# Patient Record
Sex: MALE | Race: WHITE | HISPANIC OR LATINO | Employment: UNEMPLOYED | URBAN - METROPOLITAN AREA
[De-identification: names, ages, dates, MRNs, and addresses within clinical notes are randomized per-mention and may not be internally consistent; named-entity substitution may affect disease eponyms.]

---

## 2018-09-09 ENCOUNTER — HOSPITAL ENCOUNTER (EMERGENCY)
Facility: HOSPITAL | Age: 28
End: 2018-09-10
Attending: EMERGENCY MEDICINE | Admitting: EMERGENCY MEDICINE

## 2018-09-09 DIAGNOSIS — F20.9 SCHIZOPHRENIA (HCC): Primary | ICD-10-CM

## 2018-09-09 DIAGNOSIS — R46.89 AGGRESSIVE BEHAVIOR: ICD-10-CM

## 2018-09-09 DIAGNOSIS — R45.850 HOMICIDAL IDEATIONS: ICD-10-CM

## 2018-09-09 DIAGNOSIS — R45.851 SUICIDAL IDEATION: ICD-10-CM

## 2018-09-09 LAB
AMPHETAMINES SERPL QL SCN: NEGATIVE
BARBITURATES UR QL: NEGATIVE
BENZODIAZ UR QL: NEGATIVE
COCAINE UR QL: NEGATIVE
ETHANOL EXG-MCNC: 0.01 MG/DL
METHADONE UR QL: NEGATIVE
OPIATES UR QL SCN: NEGATIVE
PCP UR QL: NEGATIVE
THC UR QL: POSITIVE

## 2018-09-09 PROCEDURE — 80307 DRUG TEST PRSMV CHEM ANLYZR: CPT | Performed by: PHYSICIAN ASSISTANT

## 2018-09-09 PROCEDURE — 82075 ASSAY OF BREATH ETHANOL: CPT | Performed by: PHYSICIAN ASSISTANT

## 2018-09-09 PROCEDURE — 96372 THER/PROPH/DIAG INJ SC/IM: CPT

## 2018-09-09 RX ORDER — HALOPERIDOL 5 MG/ML
5 INJECTION INTRAMUSCULAR ONCE
Status: COMPLETED | OUTPATIENT
Start: 2018-09-09 | End: 2018-09-09

## 2018-09-09 RX ORDER — LORAZEPAM 2 MG/ML
2 INJECTION INTRAMUSCULAR ONCE
Status: COMPLETED | OUTPATIENT
Start: 2018-09-09 | End: 2018-09-09

## 2018-09-09 RX ADMIN — LORAZEPAM 2 MG: 2 INJECTION INTRAMUSCULAR; INTRAVENOUS at 11:41

## 2018-09-09 RX ADMIN — HALOPERIDOL LACTATE 5 MG: 5 INJECTION, SOLUTION INTRAMUSCULAR at 11:32

## 2018-09-09 NOTE — ED NOTES
Per [address] Lemus will be coming to ER to meet with pt's family to complete 302        Caity Philip RN  09/09/18 2148

## 2018-09-09 NOTE — ED NOTES
Pt talking to himself at times, making exaggerated hand gestures        Rosibel Elizondo RN  09/09/18 2997

## 2018-09-09 NOTE — ED NOTES
Spoke with Joel Rajput from [facility] stating that pt has been denied and the on call CW would need to be paged to come in and conduct a bed search  On call CW paged at this time via the         Arabella Walsh RN  09/09/18 4925

## 2018-09-09 NOTE — ED NOTES
Offered pt blankets, beverage, dimmed lights, other comfort measures but pt states he is not staying  Explained to pt that a crisis worker needs to come speak with him to sort out the events that led to him being brought to ER  Pt states he was outside his house and wanted to get in to go to bed, so he was banging on door  Pt states his family always wants him to be in the hospital   Pt states he needs to go back to [place] because here in [place] and in [facility] knows everything  Pt with rambling speech, but calm and cooperative at this time         Manuel Garcia RN  09/09/18 5570

## 2018-09-09 NOTE — ED NOTES
Chief Complaint   Patient presents with    Psychiatric Evaluation     Patient presents accompanied by [employer], report police were called because patient was agitated, cursing, destroying property, and made remarks regarding killing his family and then himself  Hx of schizophrenia, has not been taking medications as prescribed  Patient provides very little information during triage, rambling and mumbling throughout  Patient presents to ED by police with rapid, tangential speech, both in [address] and [address]  Patient acting erratic, repeating that he is not crazy, and reports being brought here due to his family refusing to let him in he home  Patient made numerous requests to leave, and reports that he has to work at 9:30am today  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

## 2018-09-09 NOTE — ED NOTES
Pt continues to speak loudly to himself and make exaggerated gestures, now including gesturing shooting a gun and making a "pop" noise  Unable to determine from pt's rambling if there is an intended target to shooting gestures        Desire Lopez RN  09/09/18 0204

## 2018-09-09 NOTE — ED NOTES
Pt continues to be cooperative  Offers no complaints at this time  Pt declined meal tray per [employer]        Katelin Grey RN  09/09/18 6490

## 2018-09-09 NOTE — ED NOTES
Patient will need an in network bed due to having no insurance  No beds at [facility]  Phoned Gerardo Lesch, at [facility], they have a bed and will review  302 and face sheet faxed to [facility]

## 2018-09-09 NOTE — ED NOTES
36 petitioned by sister and upheld by [facility] and Dr Fer Lezama  Patient rights read and provided to patient

## 2018-09-09 NOTE — ED NOTES
Pt speaking in a calmer tone and requested restraints off  Pt still behaving erratically however and threatening  Explained restraints could first be reduced and discontinued with continued improvement in decreasing violent behavior       Marisol Mack RN  09/09/18 1281

## 2018-09-09 NOTE — ED NOTES
[facility] [address] [facility] into room 12 accompanied by [employer] Nava RN  09/09/18 9963

## 2018-09-09 NOTE — ED NOTES
Pt very manipulative; screaming from room, upon entering room to check on patient, he is begging and pleading to have restraints removed at this time, explained to patient that when he exhibits improvement in his behaviors     Inga Tabares RN  09/09/18 1889

## 2018-09-09 NOTE — ED ATTENDING ATTESTATION
Kris Matthew MD, saw and evaluated the patient  I have discussed the patient with the resident/non-physician practitioner and agree with the resident's/non-physician practitioner's findings, Plan of Care, and MDM as documented in the resident's/non-physician practitioner's note, except where noted  All available labs and Radiology studies were reviewed  At this point I agree with the current assessment done in the Emergency Department  I have conducted an independent evaluation of this patient a history and physical is as follows:  History of schizophrenia  He has not been taking his medications for 3 years became agitated  He became agitated here and needed to be placed in 4 point restraints with chemical sedation as well and a 302 signed  Patient was not following commands and was quite agitated in the emergency department  Critical Care Time  The patient presented with a condition in which there was a high probability of imminent or life-threatening deterioration, and critical care services (excluding separately billable procedures) totalled 30-74 minutes  CriticalCare Time  Performed by: Malia Houser by: Ann-Marie Bang [facility] provider statement:     Critical care time (minutes):  35    Critical care time was exclusive of:  Separately billable procedures and treating other patients and teaching time    Critical care was necessary to treat or prevent imminent or life-threatening deterioration of the following conditions: Agitation, 4 point restraints and chemical restraint      Critical care was time spent personally by me on the following activities:  Obtaining history from patient or surrogate, development of treatment plan with patient or surrogate, evaluation of patient's response to treatment, examination of patient and re-evaluation of patient's condition    I assumed direction of critical care for this patient from another provider in my specialty: no

## 2018-09-09 NOTE — ED NOTES
Pt continues to talk loudly to himself in room 12, in [facility], gesturing dramatically at times        Geralyn Ganser, RN  09/09/18 [address] [facility] [address] RN  09/09/18 1012

## 2018-09-09 NOTE — ED NOTES
Offered breakfast to pt, however, pt states he does not trust hospital food and will not eat or drink anything while he is here  Pt asking when he can go, advised pt that Crisis needs to come speak with him  Pt remains cooperative but continues to talk out loud to himself in room 12        Shoaib Heller RN  09/09/18 7534

## 2018-09-09 NOTE — ED RE-EVALUATION NOTE
Patient is medically clear for discharge to Psychiatric facility       hCanning DELA CRUZ PA-C  09/09/18 2561

## 2018-09-09 NOTE — ED NOTES
Phoned [facility] ([facility]) AT [place], spoke with Kirsten Harris, they will be in to complete 36 with sister     in agreement

## 2018-09-09 NOTE — ED NOTES
Patient cursing staff in [address] [facility] stating "I'm going to come back and kill all of you, I'm not khan, jump on me right now" patient continues rambling goes back and forth speaking in [place] and [facility] Rowdy Guthrie RN  09/09/18 6247

## 2018-09-09 NOTE — ED NOTES
Patient has no insurance so he will need an in network bed, was denied at [facility]  No beds at [facility], [facility], and [facility]     Bed search in the morning

## 2018-09-09 NOTE — ED NOTES
302 and Act 77 faxed to [facility] ([facility]) AT [place]

## 2018-09-09 NOTE — ED NOTES
Spoke with Patient's sister, whom resides with patient  Sister reports patient went out last night, came home and made threats to kill her, family and himself  Patient acted very erratic, rambling speech, and made threats to smash neighbors car windows  911 was called due to patient's behavior and threats  Sister reports patient has a history of mental illness, multiple past psychiatric hospitalizations in [place], and was court ordered to take medication  Patient has been non  compliant with treatment since moving to [facility] in December 2017  Sister reports patient is a danger to self and others and is willing to petition a 302

## 2018-09-09 NOTE — LETTER
Section I - General Information    Name of Patient: Robert Simpson                 : 1990    Medicare #:____________________  Transport Date: 09/10/18 (PCS is valid for round trips on this date and for all repetitive trips in the 60-day range as noted below )  Origin: [id number]                                                         Destination:________________________________________________  Is the pt's stay covered under Medicare Part A (PPS/DRG)     (_) YES  (_) NO  Closest appropriate facility?  (_) YES  (_) NO  If no, why is transport to more distant facility required?________________________  If hosp-hosp transfer, describe services needed at 2nd facility not available at 1st facility? _________________________________  If hospice pt, is this transport related to pt's terminal illness? (_) YES (_) NO Describe____________________________________    Section II - Medical Necessity Questionnaire  Ambulance transportation is medically necessary only if other means of transport are contraindicated or would be potentially harmful to the patient  To meet this requirement, the patient must either be "bed confined" or suffer from a condition such that transport by means other than ambulance is contraindicated by the patient's condition   The following questions must be answered by the medical professional signing below for this form to be valid:    1)  Describe the MEDICAL CONDITION (physical and/or mental) of this patient AT [address] [place] [address] that requires the patient to be transported in an ambulance and why transport by other means is contraindicated by the patient's condition:__________________________________________________________________________________________________    2) Is the patient "bed confined" as defined below?     (_) YES  (_) NO  To be "be confined" the patient must satisfy all three of the following conditions: (1) unable to get up from bed without Assistance; AND (2) unable to ambulate; AND (3) unable to sit in a chair or wheelchair  3) Can this patient safely be transported by car or wheelchair [place] (i e , seated during transport without a medical attendant or monitoring)?   (_) YES  (_) NO    4) In addition to completing questions 1-3 above, please check any of the following conditions that apply*:  *Note: supporting documentation for any boxes checked must be maintained in the patient's medical records  (_)Contractures   (_)Non-Healed Fractures  (_)Patient is confused (_)Patient is comatose (_)Moderate/severe pain on movement (_)Danger to self/others  (_)IV meds/fluids required (_)Patient is combative(_)Need or possible need for restraints (_)DVT requires elevation of lower extremity  (_)Medical attendant required (_)Requires oxygen-unable to self administer (_)Special handling/isolation/infection control precautions required (_)Unable to tolerate seated position for time needed to transport (_)Hemodynamic monitoring required en route (_)Unable to sit in a chair or wheelchair due to decubitus ulcers or other wounds (_)Cardiac monitoring required en route (_)Morbid obesity requires additional personnel/equipment to safely handle patient (_)Orthopedic device (backboard, halo, pins, traction, brace, wedge, etc,) requiring special handling during transport (_)Other(specify)_______________________________________________    Section III - Signature of Physician or Healthcare Professional  I certify that the above information is true and correct based on my evaluation of this patient, and represent that the patient requires transport by ambulance and that other forms of transport are contraindicated   I understand that this information will be used by the Centers for Medicare and Medicaid Services (CMS) to support the determination of medical necessity for ambulance services, and I represent that I have personal knowledge of the patient's condition at time of transport  (_) If this box is checked, I also certify that the patient is physically or mentally incapable of signing the ambulance service's claim and that the institution with which I am affiliated has furnished care, services, or assistance to the patient  My signature below is made on behalf of the patient pursuant to 42 CFR §424 36(b)(4)  In accordance with 42 CFR §424 37, the specific reason(s) that the patient is physically or mentally incapable of signing the claim form is as follows: _________________________________________________________________________________________________________      Signature of Physician* or Healthcare Professional______________________________________________________________  Signature Date 09/10/18 (For scheduled repetitive transports, this form is not valid for transports performed more than 60 days after this date)    Printed Name & Credentials of Physician or Healthcare Professional (MD, DO, RN, etc )________________________________  *Form must be signed by patient's attending physician for scheduled, repetitive transports   For non-repetitive, unscheduled ambulance transports, if unable to obtain the signature of the attending physician, any of the following may sign (choose appropriate option below)  (_) Physician Assistant (_)  Clinical Nurse Specialist (_)  Registered Nurse  (_)  Nurse Practitioner  (_) Discharge Planner

## 2018-09-09 NOTE — ED NOTES
Security officers at bedside so that [address] officers can leave  Pt calm and cooperative to this point, but [employer] officers state that pt has been violent in the past with them, and has rambling, non-sense speech at present        Elin Hernandez RN  09/09/18 5822

## 2018-09-09 NOTE — ED NOTES
Crisis Worker (CW) received phone call from [place] at [place] [address] that Pt was denied because unit has been maxed out and that another patient was accepted  Bed search will continue

## 2018-09-09 NOTE — LETTER
[facility]  [address] [address]  Dept: [phone]      EMTALA TRANSFER CONSENT    NAME Aleshia Rodriguez                                         1990                              MRN [id number]    I have been informed of my rights regarding examination, treatment, and transfer   by Dr Cal Huitron DO    Benefits: Specialized equipment and/or services available at the receiving facility (Include comment)________________________    Risks: Potential for delay in receiving treatment, Increased discomfort during transfer      Consent for Transfer:  I acknowledge that my medical condition has been evaluated and explained to me by the emergency department physician or other qualified medical person and/or my attending physician, who has recommended that I be transferred to the service of  Accepting Physician: Dr Torin Lan at [address] [place] [address] Name, [address] : [address]  The above potential benefits of such transfer, the potential risks associated with such transfer, and the probable risks of not being transferred have been explained to me, and I fully understand them  The doctor has explained that, in my case, the benefits of transfer outweigh the risks  I agree to be transferred  I authorize the performance of emergency medical procedures and treatments upon me in both transit and upon arrival at the receiving facility  Additionally, I authorize the release of any and all medical records to the receiving facility and request they be transported with me, if possible  I understand that the safest mode of transportation during a medical emergency is an ambulance and that the Hospital advocates the use of this mode of transport   Risks of traveling to the receiving facility by car, including absence of medical control, life sustaining equipment, such as oxygen, and medical personnel has been explained to me and I fully understand them     ([facility])  [ X ]  I consent to the stated transfer and to be transported by ambulance/helicopter  [  ]  I consent to the stated transfer, but refuse transportation by ambulance and accept full responsibility for my transportation by car  I understand the risks of non-ambulance transfers and I exonerate the Hospital and its staff from any deterioration in my condition that results from this refusal     X___________________________________________    DATE  09/10/18  TIME________  Signature of patient or legally responsible individual signing on patient behalf           RELATIONSHIP TO PATIENT_________________________          Provider Certification    NAME Nida Kingsley                                         1990                              MRN [id number]    A medical screening exam was performed on the above named patient  Based on the examination:    Condition Necessitating Transfer The primary encounter diagnosis was Schizophrenia ([facility] [address] )  Diagnoses of Homicidal ideations, Suicidal ideation, and Aggressive behavior were also pertinent to this visit  Patient Condition: The patient has been stabilized such that within reasonable medical probability, no material deterioration of the patient condition or the condition of the unborn child(loree) is likely to result from the transfer, No noted underlying medical condition requiring transfer to another facility   Transfer is per preference and request of patient and/or family    Reason for Transfer: Level of Care needed not available at this facility    Transfer Requirements: [address]  [facility]   · Space available and qualified personnel available for treatment as acknowledged by Angeline Amin [phone]  · Agreed to accept transfer and to provide appropriate medical treatment as acknowledged by       Dr Kaylah Baldwin  · Appropriate medical records of the examination and treatment of the patient are provided at the time of transfer   STAFF INITIAL WHEN COMPLETED me  · Transfer will be performed by qualified personnel from [facility]  and appropriate transfer equipment as required, including the use of necessary and appropriate life support measures  Provider Certification: I have examined the patient and explained the following risks and benefits of being transferred/refusing transfer to the patient/family:  General risk, such as traffic hazards, adverse weather conditions, rough terrain or turbulence, possible failure of equipment (including vehicle or aircraft), or consequences of actions of persons outside the control of the transport personnel      Based on these reasonable risks and benefits to the patient and/or the unborn child(loree), and based upon the information available at the time of the patients examination, I certify that the medical benefits reasonably to be expected from the provision of appropriate medical treatments at another medical facility outweigh the increasing risks, if any, to the individuals medical condition, and in the case of labor to the unborn child, from effecting the transfer      X____________________________________________ DATE 09/10/18        TIME_______      ORIGINAL - SEND TO MEDICAL RECORDS   COPY - SEND WITH PATIENT DURING TRANSFER

## 2018-09-09 NOTE — ED NOTES
Patient provided with sandwich and water as requested  Calm and cooperative  Resting on stretcher        [address] [place] [address], RN  09/09/18 9692

## 2018-09-09 NOTE — ED NOTES
Pt remains cooperative  Does not require additional medication or restraints        Raleigh Guerra RN  09/09/18 8326

## 2018-09-09 NOTE — ED NOTES
Upon presenting 302 patient became agitated and aggressive towards staff, lunged at staff  Control team called to bedside  Pt non redirectable, uncooperative  Pt placed in 4 point locked restraints       Reina Del Rio RN  09/09/18 2087

## 2018-09-09 NOTE — ED NOTES
Pt becoming more agitated  Standing out in hallway stating that he is going to leave  Redirected to his room several times but remains in hallway  CW redirecting pt to room 12 and currently in doorway of room 12 speaking with pt       Vicky Espinosa RN  09/09/18 2179

## 2018-09-10 ENCOUNTER — HOSPITAL ENCOUNTER (INPATIENT)
Facility: HOSPITAL | Age: 28
LOS: 17 days | Discharge: HOME | DRG: 885 | End: 2018-09-27
Attending: PSYCHIATRY & NEUROLOGY | Admitting: PSYCHIATRY & NEUROLOGY

## 2018-09-10 VITALS
SYSTOLIC BLOOD PRESSURE: 120 MMHG | HEART RATE: 88 BPM | OXYGEN SATURATION: 100 % | RESPIRATION RATE: 16 BRPM | TEMPERATURE: 98.1 F | DIASTOLIC BLOOD PRESSURE: 83 MMHG

## 2018-09-10 DIAGNOSIS — F20.0 PARANOID SCHIZOPHRENIA (HCC): Chronic | ICD-10-CM

## 2018-09-10 DIAGNOSIS — R29.818 EXTRAPYRAMIDAL SYMPTOM: Primary | ICD-10-CM

## 2018-09-10 DIAGNOSIS — F20.9 SCHIZOPHRENIA (HCC): ICD-10-CM

## 2018-09-10 LAB
BACTERIA UR QL AUTO: ABNORMAL /HPF
BILIRUB UR QL STRIP: NEGATIVE
CLARITY UR: CLEAR
COLOR UR: YELLOW
GLUCOSE UR STRIP-MCNC: NEGATIVE MG/DL
HGB UR QL STRIP.AUTO: ABNORMAL
KETONES UR STRIP-MCNC: NEGATIVE MG/DL
LEUKOCYTE ESTERASE UR QL STRIP: NEGATIVE
NITRITE UR QL STRIP: NEGATIVE
NON-SQ EPI CELLS URNS QL MICRO: ABNORMAL /HPF
PH UR STRIP.AUTO: 7 [PH] (ref 4.5–8)
PROT UR STRIP-MCNC: NEGATIVE MG/DL
RBC #/AREA URNS AUTO: ABNORMAL /HPF
SP GR UR STRIP.AUTO: 1.02 (ref 1–1.03)
UROBILINOGEN UR QL STRIP.AUTO: 1 E.U./DL
WBC #/AREA URNS AUTO: ABNORMAL /HPF

## 2018-09-10 PROCEDURE — 96372 THER/PROPH/DIAG INJ SC/IM: CPT

## 2018-09-10 PROCEDURE — 99285 EMERGENCY DEPT VISIT HI MDM: CPT

## 2018-09-10 PROCEDURE — 81001 URINALYSIS AUTO W/SCOPE: CPT | Performed by: PHYSICIAN ASSISTANT

## 2018-09-10 RX ORDER — RISPERIDONE 1 MG/1
1 TABLET, ORALLY DISINTEGRATING ORAL
Status: DISCONTINUED | OUTPATIENT
Start: 2018-09-10 | End: 2018-09-11

## 2018-09-10 RX ORDER — TRAZODONE HYDROCHLORIDE 50 MG/1
50 TABLET ORAL
Status: CANCELLED | OUTPATIENT
Start: 2018-09-10

## 2018-09-10 RX ORDER — LORAZEPAM 2 MG/ML
2 INJECTION INTRAMUSCULAR EVERY 6 HOURS PRN
Status: DISCONTINUED | OUTPATIENT
Start: 2018-09-10 | End: 2018-09-27 | Stop reason: HOSPADM

## 2018-09-10 RX ORDER — BENZTROPINE MESYLATE 0.5 MG/1
1 TABLET ORAL EVERY 6 HOURS PRN
Status: CANCELLED | OUTPATIENT
Start: 2018-09-10

## 2018-09-10 RX ORDER — OLANZAPINE 10 MG/1
10 INJECTION, POWDER, LYOPHILIZED, FOR SOLUTION INTRAMUSCULAR
Status: CANCELLED | OUTPATIENT
Start: 2018-09-10

## 2018-09-10 RX ORDER — LORAZEPAM 1 MG/1
1 TABLET ORAL EVERY 6 HOURS PRN
Status: CANCELLED | OUTPATIENT
Start: 2018-09-10

## 2018-09-10 RX ORDER — ACETAMINOPHEN 325 MG/1
650 TABLET ORAL EVERY 6 HOURS PRN
Status: DISCONTINUED | OUTPATIENT
Start: 2018-09-10 | End: 2018-09-27 | Stop reason: HOSPADM

## 2018-09-10 RX ORDER — LORAZEPAM 1 MG/1
1 TABLET ORAL ONCE
Status: DISCONTINUED | OUTPATIENT
Start: 2018-09-10 | End: 2018-09-10 | Stop reason: HOSPADM

## 2018-09-10 RX ORDER — OLANZAPINE 10 MG/1
10 TABLET ORAL
Status: DISCONTINUED | OUTPATIENT
Start: 2018-09-10 | End: 2018-09-27 | Stop reason: HOSPADM

## 2018-09-10 RX ORDER — TRAZODONE HYDROCHLORIDE 50 MG/1
50 TABLET ORAL
Status: DISCONTINUED | OUTPATIENT
Start: 2018-09-10 | End: 2018-09-27 | Stop reason: HOSPADM

## 2018-09-10 RX ORDER — HALOPERIDOL 5 MG
5 TABLET ORAL EVERY 6 HOURS PRN
Status: DISCONTINUED | OUTPATIENT
Start: 2018-09-10 | End: 2018-09-11

## 2018-09-10 RX ORDER — LORAZEPAM 2 MG/ML
2 INJECTION INTRAMUSCULAR ONCE
Status: COMPLETED | OUTPATIENT
Start: 2018-09-10 | End: 2018-09-10

## 2018-09-10 RX ORDER — BENZTROPINE MESYLATE 1 MG/ML
1 INJECTION INTRAMUSCULAR; INTRAVENOUS EVERY 6 HOURS PRN
Status: CANCELLED | OUTPATIENT
Start: 2018-09-10

## 2018-09-10 RX ORDER — ACETAMINOPHEN 325 MG/1
650 TABLET ORAL EVERY 6 HOURS PRN
Status: CANCELLED | OUTPATIENT
Start: 2018-09-10

## 2018-09-10 RX ORDER — OLANZAPINE 10 MG/1
10 TABLET ORAL
Status: CANCELLED | OUTPATIENT
Start: 2018-09-10

## 2018-09-10 RX ORDER — HALOPERIDOL 5 MG/ML
5 INJECTION INTRAMUSCULAR EVERY 6 HOURS PRN
Status: CANCELLED | OUTPATIENT
Start: 2018-09-10

## 2018-09-10 RX ORDER — MAGNESIUM HYDROXIDE/ALUMINUM HYDROXICE/SIMETHICONE 120; 1200; 1200 MG/30ML; MG/30ML; MG/30ML
30 SUSPENSION ORAL EVERY 4 HOURS PRN
Status: DISCONTINUED | OUTPATIENT
Start: 2018-09-10 | End: 2018-09-27 | Stop reason: HOSPADM

## 2018-09-10 RX ORDER — LORAZEPAM 2 MG/ML
2 INJECTION INTRAMUSCULAR EVERY 6 HOURS PRN
Status: CANCELLED | OUTPATIENT
Start: 2018-09-10

## 2018-09-10 RX ORDER — LORAZEPAM 1 MG/1
1 TABLET ORAL EVERY 6 HOURS PRN
Status: DISCONTINUED | OUTPATIENT
Start: 2018-09-10 | End: 2018-09-11

## 2018-09-10 RX ORDER — BENZTROPINE MESYLATE 1 MG/ML
1 INJECTION INTRAMUSCULAR; INTRAVENOUS EVERY 6 HOURS PRN
Status: DISCONTINUED | OUTPATIENT
Start: 2018-09-10 | End: 2018-09-27 | Stop reason: HOSPADM

## 2018-09-10 RX ORDER — BENZTROPINE MESYLATE 1 MG/1
1 TABLET ORAL EVERY 6 HOURS PRN
Status: DISCONTINUED | OUTPATIENT
Start: 2018-09-10 | End: 2018-09-27 | Stop reason: HOSPADM

## 2018-09-10 RX ORDER — RISPERIDONE 1 MG/1
1 TABLET, ORALLY DISINTEGRATING ORAL
Status: CANCELLED | OUTPATIENT
Start: 2018-09-10

## 2018-09-10 RX ORDER — OLANZAPINE 10 MG/1
10 INJECTION, POWDER, LYOPHILIZED, FOR SOLUTION INTRAMUSCULAR
Status: DISCONTINUED | OUTPATIENT
Start: 2018-09-10 | End: 2018-09-27 | Stop reason: HOSPADM

## 2018-09-10 RX ORDER — ZIPRASIDONE MESYLATE 20 MG/ML
20 INJECTION, POWDER, LYOPHILIZED, FOR SOLUTION INTRAMUSCULAR ONCE
Status: COMPLETED | OUTPATIENT
Start: 2018-09-10 | End: 2018-09-10

## 2018-09-10 RX ORDER — MAGNESIUM HYDROXIDE/ALUMINUM HYDROXICE/SIMETHICONE 120; 1200; 1200 MG/30ML; MG/30ML; MG/30ML
30 SUSPENSION ORAL EVERY 4 HOURS PRN
Status: CANCELLED | OUTPATIENT
Start: 2018-09-10

## 2018-09-10 RX ORDER — HALOPERIDOL 5 MG
5 TABLET ORAL EVERY 6 HOURS PRN
Status: CANCELLED | OUTPATIENT
Start: 2018-09-10

## 2018-09-10 RX ORDER — HALOPERIDOL 5 MG/ML
5 INJECTION INTRAMUSCULAR EVERY 6 HOURS PRN
Status: DISCONTINUED | OUTPATIENT
Start: 2018-09-10 | End: 2018-09-27 | Stop reason: HOSPADM

## 2018-09-10 RX ADMIN — LORAZEPAM 2 MG: 2 INJECTION INTRAMUSCULAR; INTRAVENOUS at 08:19

## 2018-09-10 RX ADMIN — BENZTROPINE MESYLATE 1 MG: 1 TABLET ORAL at 19:00

## 2018-09-10 RX ADMIN — RISPERIDONE 1 MG: 1 TABLET, ORALLY DISINTEGRATING ORAL at 23:30

## 2018-09-10 RX ADMIN — LORAZEPAM 1 MG: 1 TABLET ORAL at 19:00

## 2018-09-10 RX ADMIN — TRAZODONE HYDROCHLORIDE 50 MG: 50 TABLET ORAL at 23:30

## 2018-09-10 RX ADMIN — WATER 1.2 ML: 1 INJECTION INTRAMUSCULAR; INTRAVENOUS; SUBCUTANEOUS at 08:19

## 2018-09-10 RX ADMIN — HALOPERIDOL 5 MG: 5 TABLET ORAL at 19:00

## 2018-09-10 RX ADMIN — ZIPRASIDONE MESYLATE 20 MG: 20 INJECTION, POWDER, LYOPHILIZED, FOR SOLUTION INTRAMUSCULAR at 08:18

## 2018-09-10 NOTE — PROGRESS NOTES
Belongings at Bedside:  Jeans    Belongings in locker:  Shirt(strings)  Sweater(strings)  [address] [employer] [address]  Phone (very cracked)  [employer] [address] w/[address] in [address] [place] [address]

## 2018-09-10 NOTE — ED NOTES
Patient is accepted at [facility]  Patient is accepted by Dr Aziza Yeung per [facility] is arranged with [employer] is scheduled for 2p    Nurse report is to be called to [phone] prior to patient transfer

## 2018-09-10 NOTE — ED NOTES
Patient in hallway asking if he can go home  I informed him that crisis will come in and talk to him and I also ordered him breakfast  RN Claudene Bush is in room at this time talking to patient        Josefina Bond  09/10/18 0735       Josefina Bond  09/10/18 0707

## 2018-09-10 NOTE — ED NOTES
Patient ambulating with in room  Eating his lunch while ambulating       Sang HARIS Rios  09/10/18 5720

## 2018-09-10 NOTE — ED NOTES
Informed pt he was going to be transported to [facility] for treatment at 2 pm  He was agreeable       Sheree Robles, HARIS  09/10/18 7634

## 2018-09-10 NOTE — PROGRESS NOTES
Pt attempted to open fire exit door  Informed pt that he can not open the door and pt asked "when do I get out here, how do I get out here?" Encouraged pt speak with the psychiatrist tomorrow morning about his treatment plan and pt replied "I don't want to be here  The police brought me here  If I see the , I will tell him why he brought me here" Pt made anger gestures and gestured as if he was hitting  while he spoke what he was going to tell him  Pt has been pacing the halls prior to this incident  Pt has received ativan, haldol, and cogentin prn  Continue to monitor

## 2018-09-10 NOTE — ED NOTES
Patient requesting breakfast  Informed that trays would be available starting at 0700          [address] [facility] [address], RN  09/10/18 6244

## 2018-09-10 NOTE — ED NOTES
Pt came out of room to use bathroom with steady gait  Asked when he could leave  Explained that he could not leave at this time   Pt easily redirectible back to his room and given apple juice at his request      Aldo Garza RN  09/10/18 7643

## 2018-09-10 NOTE — ED NOTES
Patient on stretcher, appears to be sleeping  Respirations even/unlabored       Jose bAdi RN  09/10/18 0578

## 2018-09-10 NOTE — ED NOTES
Patient ambulatory to nurse's station asking about plan of care  Updated on plan and notified of morning bed search  Provided with beverage  Calm and cooperative        Germain Merchant RN  09/10/18 9049

## 2018-09-11 PROBLEM — F29 PSYCHOSIS (HCC): Status: ACTIVE | Noted: 2018-09-11

## 2018-09-11 LAB
ALBUMIN SERPL BCP-MCNC: 3.5 G/DL (ref 3.5–5)
ALP SERPL-CCNC: 95 U/L (ref 46–116)
ALT SERPL W P-5'-P-CCNC: 25 U/L (ref 12–78)
ANION GAP SERPL CALCULATED.3IONS-SCNC: 8 MMOL/L (ref 4–13)
AST SERPL W P-5'-P-CCNC: 22 U/L (ref 5–45)
BASOPHILS # BLD AUTO: 0.06 THOUSANDS/ΜL (ref 0–0.1)
BASOPHILS NFR BLD AUTO: 1 % (ref 0–1)
BILIRUB SERPL-MCNC: 0.3 MG/DL (ref 0.2–1)
BUN SERPL-MCNC: 12 MG/DL (ref 5–25)
CALCIUM SERPL-MCNC: 8.8 MG/DL (ref 8.3–10.1)
CHLORIDE SERPL-SCNC: 103 MMOL/L (ref 100–108)
CHOLEST SERPL-MCNC: 118 MG/DL (ref 50–200)
CO2 SERPL-SCNC: 29 MMOL/L (ref 21–32)
CREAT SERPL-MCNC: 0.83 MG/DL (ref 0.6–1.3)
EOSINOPHIL # BLD AUTO: 0.22 THOUSAND/ΜL (ref 0–0.61)
EOSINOPHIL NFR BLD AUTO: 3 % (ref 0–6)
ERYTHROCYTE [DISTWIDTH] IN BLOOD BY AUTOMATED COUNT: 12.4 % (ref 11.6–15.1)
EST. AVERAGE GLUCOSE BLD GHB EST-MCNC: 105 MG/DL
GFR SERPL CREATININE-BSD FRML MDRD: 121 ML/MIN/1.73SQ M
GLUCOSE P FAST SERPL-MCNC: 99 MG/DL (ref 65–99)
GLUCOSE SERPL-MCNC: 99 MG/DL (ref 65–140)
HBA1C MFR BLD: 5.3 % (ref 4.2–6.3)
HCT VFR BLD AUTO: 47.5 % (ref 36.5–49.3)
HDLC SERPL-MCNC: 37 MG/DL (ref 40–60)
HGB BLD-MCNC: 16 G/DL (ref 12–17)
IMM GRANULOCYTES # BLD AUTO: 0.03 THOUSAND/UL (ref 0–0.2)
IMM GRANULOCYTES NFR BLD AUTO: 0 % (ref 0–2)
LDLC SERPL CALC-MCNC: 71 MG/DL (ref 0–100)
LYMPHOCYTES # BLD AUTO: 2.13 THOUSANDS/ΜL (ref 0.6–4.47)
LYMPHOCYTES NFR BLD AUTO: 29 % (ref 14–44)
MCH RBC QN AUTO: 31.1 PG (ref 26.8–34.3)
MCHC RBC AUTO-ENTMCNC: 33.7 G/DL (ref 31.4–37.4)
MCV RBC AUTO: 92 FL (ref 82–98)
MONOCYTES # BLD AUTO: 0.45 THOUSAND/ΜL (ref 0.17–1.22)
MONOCYTES NFR BLD AUTO: 6 % (ref 4–12)
NEUTROPHILS # BLD AUTO: 4.44 THOUSANDS/ΜL (ref 1.85–7.62)
NEUTS SEG NFR BLD AUTO: 61 % (ref 43–75)
NONHDLC SERPL-MCNC: 81 MG/DL
NRBC BLD AUTO-RTO: 0 /100 WBCS
PLATELET # BLD AUTO: 219 THOUSANDS/UL (ref 149–390)
PMV BLD AUTO: 9.8 FL (ref 8.9–12.7)
POTASSIUM SERPL-SCNC: 4 MMOL/L (ref 3.5–5.3)
PROT SERPL-MCNC: 7 G/DL (ref 6.4–8.2)
RBC # BLD AUTO: 5.15 MILLION/UL (ref 3.88–5.62)
RPR SER QL: NORMAL
SODIUM SERPL-SCNC: 140 MMOL/L (ref 136–145)
TRIGL SERPL-MCNC: 49 MG/DL
TSH SERPL DL<=0.05 MIU/L-ACNC: 2.06 UIU/ML (ref 0.36–3.74)
WBC # BLD AUTO: 7.33 THOUSAND/UL (ref 4.31–10.16)

## 2018-09-11 PROCEDURE — 99252 IP/OBS CONSLTJ NEW/EST SF 35: CPT | Performed by: PHYSICIAN ASSISTANT

## 2018-09-11 PROCEDURE — 80061 LIPID PANEL: CPT | Performed by: PHYSICIAN ASSISTANT

## 2018-09-11 PROCEDURE — 85025 COMPLETE CBC W/AUTO DIFF WBC: CPT | Performed by: PHYSICIAN ASSISTANT

## 2018-09-11 PROCEDURE — 83036 HEMOGLOBIN GLYCOSYLATED A1C: CPT | Performed by: PHYSICIAN ASSISTANT

## 2018-09-11 PROCEDURE — 84443 ASSAY THYROID STIM HORMONE: CPT | Performed by: PHYSICIAN ASSISTANT

## 2018-09-11 PROCEDURE — 86592 SYPHILIS TEST NON-TREP QUAL: CPT | Performed by: PHYSICIAN ASSISTANT

## 2018-09-11 PROCEDURE — 99223 1ST HOSP IP/OBS HIGH 75: CPT | Performed by: PSYCHIATRY & NEUROLOGY

## 2018-09-11 PROCEDURE — 80053 COMPREHEN METABOLIC PANEL: CPT | Performed by: PHYSICIAN ASSISTANT

## 2018-09-11 RX ORDER — OLANZAPINE 5 MG/1
5 TABLET, ORALLY DISINTEGRATING ORAL DAILY
Status: DISCONTINUED | OUTPATIENT
Start: 2018-09-12 | End: 2018-09-15

## 2018-09-11 RX ORDER — OLANZAPINE 5 MG/1
5 TABLET, ORALLY DISINTEGRATING ORAL
Status: DISCONTINUED | OUTPATIENT
Start: 2018-09-11 | End: 2018-09-11

## 2018-09-11 RX ORDER — CHLORPROMAZINE HYDROCHLORIDE 25 MG/ML
50 INJECTION INTRAMUSCULAR EVERY 8 HOURS PRN
Status: DISCONTINUED | OUTPATIENT
Start: 2018-09-11 | End: 2018-09-27 | Stop reason: HOSPADM

## 2018-09-11 RX ORDER — HALOPERIDOL 5 MG
10 TABLET ORAL EVERY 8 HOURS PRN
Status: DISCONTINUED | OUTPATIENT
Start: 2018-09-11 | End: 2018-09-27 | Stop reason: HOSPADM

## 2018-09-11 RX ORDER — CHLORPROMAZINE HYDROCHLORIDE 25 MG/1
50 TABLET, FILM COATED ORAL EVERY 8 HOURS PRN
Status: DISCONTINUED | OUTPATIENT
Start: 2018-09-11 | End: 2018-09-15

## 2018-09-11 RX ORDER — OLANZAPINE 10 MG/1
10 TABLET, ORALLY DISINTEGRATING ORAL
Status: DISCONTINUED | OUTPATIENT
Start: 2018-09-11 | End: 2018-09-12

## 2018-09-11 RX ORDER — LORAZEPAM 1 MG/1
2 TABLET ORAL EVERY 6 HOURS PRN
Status: DISCONTINUED | OUTPATIENT
Start: 2018-09-11 | End: 2018-09-27 | Stop reason: HOSPADM

## 2018-09-11 RX ADMIN — CHLORPROMAZINE HYDROCHLORIDE 50 MG: 25 TABLET, SUGAR COATED ORAL at 16:13

## 2018-09-11 RX ADMIN — HALOPERIDOL 5 MG: 5 TABLET ORAL at 12:30

## 2018-09-11 RX ADMIN — BENZTROPINE MESYLATE 1 MG: 1 TABLET ORAL at 12:30

## 2018-09-11 RX ADMIN — OLANZAPINE 10 MG: 10 TABLET, ORALLY DISINTEGRATING ORAL at 21:40

## 2018-09-11 RX ADMIN — OLANZAPINE 10 MG: 10 TABLET, FILM COATED ORAL at 15:15

## 2018-09-11 RX ADMIN — LORAZEPAM 1 MG: 1 TABLET ORAL at 12:30

## 2018-09-11 NOTE — CONSULTS
Consult Note     Assessment:  Schizophrenia  Alcohol dependence  Substance abuse-UDS positive for THC on admission      Plan:  Admitted in the hospital for psych evaluation and treatment  Continue all medications  Advice to have urological evaluation as an outpatient secondary to have positive RBCs/WBC on microscopic urine exam   Urine culture is pending   ______________________________________________________________________    Consulting Service: Psychiatry    Chief Complaint:  Agitation, threatening to kill family and himself    HPI: Sunshine Marie,  a 34 y o  male is admitted in the hospital for worsening agitation, made remarks regarding killing his family in then himself  The patient's sister reports that patient went out last night, came home and made threats to kill her, family and himself  Patient acted very erratic, aggressive behavior, 911 was called due to patient's behavior and threats  History of multiple psych admissions in the in the [place]  Patient has been off of his psych medications for more than a year  Patient denies any suicide ideation but still has some idea about hurting other people spent he gets out of the hospital   No auditory or visual hallucinations  Patient states he feels fine physically   Denies any medical issues at present including chest pain, difficulty breathing, fever, chills, any abdominal issues or any urological issues    Review of Systems:  General: negative  Cardiovascular: no chest pain or dyspnea on exertion  Respiratory: no cough, shortness of breath, or wheezing  Gastrointestinal: no abdominal pain, change in bowel habits, or black or bloody stools  Genitourinary ROS: no dysuria, trouble voiding, or hematuria  Musculoskeletal ROS: negative  Neurological ROS: no TIA or stroke symptoms  Hematological and Lymphatic ROS: negative  Dermatological ROS: negative  Psychological ROS: positive for - hostility, irritability, mood swings and aggressive in erratic behavior  Ophthalmic ROS: negative  ENT ROS: negative    Past Medical History:  Past Medical History:   Diagnosis Date    Psychiatric disorder     Schizophrenia [facility])        Past Surgical History:  No past surgical history on file  Social History:  History   Alcohol Use    4 2 - 6 0 oz/week    7 - 10 Cans of beer per week     Comment: Socially     History   Drug Use No     History   Smoking Status    Current Every Day Smoker    Packs/day: 0 25    Types: Cigarettes   Smokeless Tobacco    Never Used       Family History:  History reviewed  No pertinent family history  Allergies:  No Known Allergies    Medications:  Current Facility-Administered Medications   Medication Dose Route Frequency    acetaminophen (TYLENOL) tablet 650 mg  650 mg Oral Q6H PRN    aluminum-magnesium hydroxide-simethicone (MYLANTA) 200-200-20 mg/5 mL oral suspension 30 mL  30 mL Oral Q4H PRN    benztropine (COGENTIN) injection 1 mg  1 mg Intramuscular Q6H PRN    benztropine (COGENTIN) tablet 1 mg  1 mg Oral Q6H PRN    haloperidol (HALDOL) tablet 5 mg  5 mg Oral Q6H PRN    haloperidol lactate (HALDOL) injection 5 mg  5 mg Intramuscular Q6H PRN    LORazepam (ATIVAN) 2 mg/mL injection 2 mg  2 mg Intramuscular Q6H PRN    LORazepam (ATIVAN) tablet 1 mg  1 mg Oral Q6H PRN    magnesium hydroxide (MILK OF MAGNESIA) 400 mg/5 mL oral suspension 30 mL  30 mL Oral Daily PRN    nicotine polacrilex (NICORETTE) gum 2 mg  2 mg Oral Q2H PRN    OLANZapine (ZyPREXA) IM injection 10 mg  10 mg Intramuscular Q3H PRN    OLANZapine (ZyPREXA) tablet 10 mg  10 mg Oral Q3H PRN    risperiDONE (RisperDAL M-TABS) dispersible tablet 1 mg  1 mg Oral Q3H PRN    traZODone (DESYREL) tablet 50 mg  50 mg Oral HS PRN       Vitals:  /85 (09/11/18 0734)    Temp 97 5 °F (36 4 °C) (09/11/18 0734)    Pulse 88 (09/11/18 0734)   Resp 20 (09/11/18 0734)    SpO2        I/Os:  No intake/output data recorded  No intake/output data recorded      Lab Results and Cultures:   CBC with diff:   Lab Results   Component Value Date    WBC 7 33 09/11/2018    HGB 16 0 09/11/2018    HCT 47 5 09/11/2018    MCV 92 09/11/2018     09/11/2018    MCH 31 1 09/11/2018    MCHC 33 7 09/11/2018    RDW 12 4 09/11/2018    MPV 9 8 09/11/2018    NRBC 0 09/11/2018   ,   BMP/CMP:  Lab Results   Component Value Date     09/11/2018    K 4 0 09/11/2018     09/11/2018    CO2 29 09/11/2018    BUN 12 09/11/2018    CREATININE 0 83 09/11/2018    CALCIUM 8 8 09/11/2018    AST 22 09/11/2018    ALT 25 09/11/2018    ALKPHOS 95 09/11/2018    EGFR 121 09/11/2018   ,   Lipid Panel: No results found for: CHOL,   Coags: No results found for: PT, PTT, INR,     Blood Culture: No results found for: BLOODCX,   Urinalysis:   Lab Results   Component Value Date    COLORU Yellow 09/10/2018    CLARITYU Clear 09/10/2018    SPECGRAV 1 020 09/10/2018    PHUR 7 0 09/10/2018    LEUKOCYTESUR Negative 09/10/2018    NITRITE Negative 09/10/2018    PROTEINUA Negative 09/10/2018    GLUCOSEU Negative 09/10/2018    KETONESU Negative 09/10/2018    BILIRUBINUR Negative 09/10/2018    BLOODU Trace-Intact (A) 09/10/2018       Physical Exam:    General appearance: alert and oriented, in no acute distress  Head: Normocephalic, without obvious abnormality, atraumatic  Eyes: conjunctivae/corneas clear  PERRL, EOM's intact  Fundi benign  Throat: lips, mucosa, and tongue normal; teeth and gums normal  Neck: no adenopathy, no carotid bruit, no JVD, supple, symmetrical, trachea midline and thyroid not enlarged, symmetric, no tenderness/mass/nodules  Back: symmetric, no curvature  ROM normal  No CVA tenderness    Lungs: clear to auscultation bilaterally  Chest wall: no tenderness  Heart: regular rate and rhythm, S1, S2 normal, no murmur, click, rub or gallop  Abdomen: soft, non-tender; bowel sounds normal; no masses,  no organomegaly  Genitalia: deferred  Rectal: deferred  Extremities: extremities normal, warm and well-perfused; no cyanosis, clubbing, or edema  Skin: Skin color, texture, turgor normal  No rashes or lesions  Neurologic: Grossly normal      Sunil Hodge PA-C  9/11/2018

## 2018-09-11 NOTE — CASE MANAGEMENT
MALDONADO met with pt to discuss dc planning  Pt was eager to know when he will be discharged  MALDONADO explained that sister petitioned 36 due to pt's homicidal language towards family  Pt denied this vigorously with this writer  MALDONADO then explained to pt that there will be a [address] hearing on Friday  Pt seemed to be irritated and frustrated by this  Pt was clear in saying his anger was not towards this writer but towards his sister who is "my enemy"  MALDONADO finished by saying to pt that this writer will meet with pt in the morning  Pt was agreeable and thanked this writer

## 2018-09-11 NOTE — PROGRESS NOTES
Pt is a 302 from [facility], petitioned by sister  Brought in by police, according to ED report, after making threats to family and self  Pt is a poor historian during admission intake  Denies SI, HI, AVH upon admission  States pt had no place to sleep and that is why pt was brought to ED  Now pt's cousin will let pt stay with him, so pt says he can leave now  Pt denied need for IP, states no previous hospitalizations  According to ED report, has had hospitalizations in [place]  Pt was calm during interaction, mumbled speech  According to ED report, pt was agitated and aggressive, verbally threatening  Pt is a smoker, reports ETOH 1-2 beers/day  UDS(+) for THC, reports smoking marijuana once/day  Pt denies medication allergies, but then stated allergies that had given pt rashes, unsure of medications  Pt in bed, meal delivered

## 2018-09-11 NOTE — PROGRESS NOTES
Pt received ativan, haldol and cogentin prn for agitation  Medication effective  Pt is currently laying down in bed; no signs of distress observed  Continue to monitor

## 2018-09-11 NOTE — PROGRESS NOTES
Pt increasingly agitated throughout afternoon  Continued pacing after receiving prn medications at 1230  Loud and talking incoherently in both [place]  Pt observed RIS  Talking/mumbling to self  Pt making motion with hand as if shooting a gun  Pointing at others and pointing out the window  Focused on leaving the hospital and blaming sister for admission here  Pt approached RN station loudly yelling and pounding fists on RN station  Control team was called at 1513 d/t increasing agitation  Pt brought to quiet room  Given zyprexa prn PO  Loud and yelling  Stated to writer "make me stay here  See what happens " Pt yelled at staff when attempting to administer medications "you just want to kill me " Pt blaming sister for admission  Stated he does not need to be here  Told staff that no one here believes in God  Pt rambling, nonsensical, and continues to alternate between speaking [place] and [place]  Offered dinner/snacks  Pt declined and rambled incoherently  Difficult to follow his responses

## 2018-09-11 NOTE — H&P
Psychiatric Evaluation - [facility] 34 y o  male MRN: [id number]  Unit/Bed#: [facility] 253-01 Encounter: [id number]    Assessment/Plan   Principal Problem:    Psychosis  r/o schizophrenia versus bipolar disorder  Plan:   1  Check admission labs  2  Collaborate with family for baseline assessment and disposition planning  3  Add Olanzapine 10 mg at HS for psychosis management  4  Initiate 303 paperwork  Risks, benefits and possible side effects of Medications:   Risks, benefits, and possible side effects of medications explained to patient and patient verbalizes understanding  Chief Complaint: "I need to leave"    History of Present Illness     Patient is a 34 y o  male presents on [id number] done by sister for patient with recent increase in aggression towards self and others  According to 302 patient is threatening others and threatening to harm self  In the emergency room patient required controlled team and p r n  medication  After admission to the unit patient remained internally preoccupied and was pushing at fire doors  Patient continues to deny all 302 allegations and reports that he is not psychotic  Patient have disorganized speech and thought process with tangential thought process needing redirection  Patient remained a poor historian and continues to deny any physical or emotional symptoms  Patient received following p r n  medications till now:  Haldol X2; Geodon X1; risperidone X1 and Ativan X 2  I attempted many times to explain that he is on a 302 and 303 hearing will be done based on his behavior and medication compliance on the unit  Patient remained with poor insight regarding need for medication but has agreed to initiate olanzapine at bedtime only      Medical Review Of Systems:  denies    Psychiatric Review Of Systems:  sleep: yes  appetite changes: no  weight changes: no  energy/anergy: yes  interest/pleasure/anhedonia: no  somatic symptoms: yes  anxiety/panic: yes  andrew: no  guilty/hopeless: yes  self injurious behavior/risky behavior: yes    Historical Information     Past Psychiatric History:   Currently in treatment with none    Past Suicide attempts: denies  Past Violent behavior: yes  Past Psychiatric medication trial: not known    Substance Abuse History:  UDS+ cannabis    Social History     Tobacco History     Smoking Status  Current Every Day Smoker Smoking Frequency  0 25 packs/day Smoking Tobacco Type  Cigarettes    Smokeless Tobacco Use  Never Used          Alcohol History     Alcohol Use Status  Yes Drinks/Week  7-10 Cans of beer per week Amount  4 2 - 6 0 oz alcohol/wk Comment  Socially          Drug Use     Drug Use Status  No          Sexual Activity     Sexually Active  Not Asked          Activities of Daily Living    Not Asked               Additional Substance Use Detail     Questions Responses    Substance Use Assessment Substance use within the past 12 months    Alcohol Use Frequency 3 or more times/week    Cannabis frequency Daily    Comment: Daily on 9/10/2018     Heroin Frequency Denies use in past 12 months    Alcohol Drink of Choice beer    Cannabis method Smoke    Comment: Smoke on 9/10/2018     Cocaine frequency Never used    Comment: Never used on 9/10/2018     Crack Cocaine Frequency Denies use in past 12 months    Methamphetamine Frequency Denies use in past 12 months    Narcotic Frequency Denies use in past 12 months    Benzodiazepine Frequency Denies use in past 12 months    Amphetamine frequency Denies use in past 12 months    Barbituate Frequency Denies use use in past 12 months    Inhalant frequency Never used    Comment: Never used on 9/10/2018     Hallucinogen frequency Never used    Comment: Never used on 9/10/2018     Ecstasy frequency Never used    Comment: Never used on 9/10/2018     Other drug frequency Never used    Comment: Never used on 9/10/2018     Opiate frequency Denies use in past 12 months    Last reviewed by John Gutierrez RN on 9/10/2018        I am unable to assess the patient for substance use within the past 12 months as they are unable or unwilling to answer    Family Psychiatric History:   Not known    Social History:  Lives with sister- ? Homeless now  Not working  Poor historian    Traumatic History:   Abuse:not known    Past Medical History:   Diagnosis Date    Psychiatric disorder     Schizophrenia [facility])            Meds/Allergies   all current active meds have been reviewed  No Known Allergies    Objective   Vital signs in last 24 hours:  Temp:  [96 °F (35 6 °C)-98 1 °F (36 7 °C)] 97 °F (36 1 °C)  HR:  [78-96] 94  Resp:  [16-20] 20  BP: (120-139)/(80-96) 139/80    No intake or output data in the 24 hours ending 09/11/18 1229    Mental Status Evaluation:  Appearance:  disheveled   Behavior:  guarded   Speech:  delayed and loud   Mood:  anxious   Affect:  increased in range   Language: naming objects   Thought Process:  circumstantial   Thought Content:  delusions  persecutory   Perceptual Disturbances: internally preoccupied   Risk Potential: Suicidal Ideations none, Homicidal Ideations none and Potential for Aggression No   Sensorium:  person   Cognition:  grossly intact   Consciousness:  awake    Attention: attention span appeared shorter than expected for age   Intellect: decreased   Fund of Knowledge: awareness of current events: fair   Insight:  limited   Judgment: limited   Muscle Strength and Tone: arm(s): bilateral   Gait/Station: normal gait/station   Motor Activity: no abnormal movements     Memory: Short and long term memory  fair       Laboratory results:    I have personally reviewed all pertinent laboratory/tests results    Labs in last 72 hours:   Recent Labs      09/11/18   0624   WBC  7 33   RBC  5 15   HGB  16 0   HCT  47 5   PLT  219   RDW  12 4   NEUTROABS  4 44   NA  140   K  4 0   CL  103   CO2  29   BUN  12   CREATININE  0 83   GLUC  99   GLUF  99   CALCIUM  8 8   AST 22   ALT  25   ALKPHOS  95   TP  7 0   ALB  3 5   TBILI  0 30   CHOLESTEROL  118   HDL  37*   TRIG  49   LDLCALC  71   [id number]  2 061     Admission Labs:   Admission on 09/10/2018   Component Date Value    Color, UA 09/10/2018 Yellow     Clarity, UA 09/10/2018 Clear     Specific Gravity, UA 09/10/2018 1 020     pH, UA 09/10/2018 7 0     Leukocytes, UA 09/10/2018 Negative     Nitrite, UA 09/10/2018 Negative     Protein, UA 09/10/2018 Negative     Glucose, UA 09/10/2018 Negative     Ketones, UA 09/10/2018 Negative     Urobilinogen, UA 09/10/2018 1 0     Bilirubin, UA 09/10/2018 Negative     Blood, UA 09/10/2018 Trace-Intact*    RBC, UA 09/10/2018 2-4*    WBC, UA 09/10/2018 1-2*    Epithelial Cells 09/10/2018 Occasional     Bacteria, UA 09/10/2018 None Seen     WBC 09/11/2018 7 33     RBC 09/11/2018 5 15     Hemoglobin 09/11/2018 16 0     Hematocrit 09/11/2018 47 5     MCV 09/11/2018 92     MCH 09/11/2018 31 1     MCHC 09/11/2018 33 7     RDW 09/11/2018 12 4     MPV 09/11/2018 9 8     Platelets 38/12/0078 219     nRBC 09/11/2018 0     Neutrophils Relative 09/11/2018 61     Immat GRANS % 09/11/2018 0     Lymphocytes Relative 09/11/2018 29     Monocytes Relative 09/11/2018 6     Eosinophils Relative 09/11/2018 3     Basophils Relative 09/11/2018 1     Neutrophils Absolute 09/11/2018 4 44     Immature Grans Absolute 09/11/2018 0 03     Lymphocytes Absolute 09/11/2018 2 13     Monocytes Absolute 09/11/2018 0 45     Eosinophils Absolute 09/11/2018 0 22     Basophils Absolute 09/11/2018 0 06     Sodium 09/11/2018 140     Potassium 09/11/2018 4 0     Chloride 09/11/2018 103     CO2 09/11/2018 29     ANION GAP 09/11/2018 8     BUN 09/11/2018 12     Creatinine 09/11/2018 0 83     Glucose 09/11/2018 99     Glucose, Fasting 09/11/2018 99     Calcium 09/11/2018 8 8     AST 09/11/2018 22     ALT 09/11/2018 25     Alkaline Phosphatase 09/11/2018 95     Total Protein 09/11/2018 7 0     Albumin 09/11/2018 3 5     Total Bilirubin 09/11/2018 0 30     eGFR 09/11/2018 121     Cholesterol 09/11/2018 118     Triglycerides 09/11/2018 49     HDL, Direct 09/11/2018 37*    LDL Calculated 09/11/2018 71     Non-HDL-Chol (CHOL-HDL) 09/11/2018 81     TSH 3RD GENERATON 09/11/2018 2 061      Risks / Benefits of Treatment:     Risks, benefits, and possible side effects of medications explained to patient  The patient verbalizes understanding and agreement for treatment  Counseling / Coordination of Care:     Patient's presentation on admission and proposed treatment plan discussed with treatment team   Diagnosis, medication changes and treatment plan reviewed with patient  Recent stressors discussed with patient     Events leading to admission reviewed with patient  Importance of medication and treatment compliance reviewed with patient  Inpatient Psychiatric Certification:     Certification: Based upon physical, mental and social evaluations, I certify that inpatient psychiatric services are medically necessary for this patient for a duration of 7 midnights for the treatment of Psychosis    Available alternative community resources do not meet the patient's mental health care needs  I further attest that an established written individualized plan of care has been implemented and is outlined in the patient's medical records  This note has been constructed using a voice recognition system  There may be translation, syntax,  or grammatical errors  If you have any questions, please contact the dictating provider

## 2018-09-11 NOTE — TREATMENT PLAN
TREATMENT PLAN REVIEW - [facility] 34 y o  1990 male MRN: [id number]    [address] [place] [address] [facility] 754-76 Encounter: [id number]          Admit Date/Time:  9/10/2018  2:59 PM    Treatment Team: Attending Provider: Helen Neely; Patient Care Technician: Sha Ames; Patient Care Technician: Susana Hunter;  Registered Nurse: Nisha Rodriguez RN; Registered Nurse: Kendall Owens, RN; Registered Nurse: Marii Diaz, HARIS; Registered Nurse: Viviana Bruno, HARIS; Occupational Therapy Assistant: LESLY Baxter; Patient Care Technician: Braulio Ordoñez; Patient Care Technician: Lashonda Artis    Diagnosis: Principal Problem:    Psychosis    Patient Strengths/Assets: cooperative    Patient Barriers/Limitations: low self esteem, noncompliant with medication, patient is on an involuntary commitment    Short Term Goals: decrease in depressive symptoms, decrease in anxiety symptoms, decrease in paranoid thoughts, decrease in psychotic symptoms    Long Term Goals: improvement in depression, improvement in anxiety, stabilization of mood, free of suicidal thoughts, resolution of psychotic symptoms, improvement in reality testing, acceptance of need for psychiatric medications, acceptance of need for psychiatric treatment, acceptance of need for psychiatric follow up after discharge    Progress Towards Goals: starting psychiatric medications as prescribed    Recommended Treatment: medication management, patient medication education, group therapy, milieu therapy, continued Behavioral Health psychiatric evaluation/assessment process    Treatment Frequency: daily medication monitoring, group and milieu therapy daily, monitoring through interdisciplinary rounds, monitoring through weekly patient care conferences    Expected Discharge Date: 3-7 days    Discharge Plan: referral for outpatient medication management with a psychiatrist, referral for outpatient psychotherapy    Treatment Plan Created/Updated By: Tera Ying

## 2018-09-11 NOTE — CASE MANAGEMENT
SW met with pt who is disheveled, rambling and incoherent  Pt was unable to provide clear concrete answers but kept on returning back to pt stating that he is ready for dc  Pt could not answer clearly where he is living  Pt would not sign a release of information for any family member and states his sister is "the enemy" which may be because sister endorsed pt being [phone] and is the petitioner  Pt kept asking if this writer was the doctor  This writer explained role of the  as part of the treatment team but pt was unable to listen and kept perseverating on his need to be discharged  At this stage it is unclear where pt is living, his source of income, outpatient supports and mental health supports  SW will attempt to discuss this again with pt later today

## 2018-09-11 NOTE — CASE MANAGEMENT
MALDONADO spoke to Dax at [facility] ([phone]) to request a court hearing for 9/14/18  MALDONADO provided phone number and Dax reports she will call back on Thursday afternoon with a time for the hearing  MALDONADO outreached to Florin at [facility] ([phone]) who provided authorization code [id number] for the [facility] hearing  Rubina requests that all paperwork is faxed to her on completion of the hearing  She provided fax number [phone] which is the fax at her desk   MALDONADO provided authorization code to Dax @ [facility]

## 2018-09-11 NOTE — PLAN OF CARE
Problem: Ineffective Coping  Goal: Cooperates with admission process  Interventions:   - Complete admission process  Outcome: Completed Date Met: 09/10/18

## 2018-09-11 NOTE — PROGRESS NOTES
Reports sleeping well  Denies SI/HI, anxiety and depression  Patient is walking the hallway,appears anxious and is preoccupied with being discharged  He states he is ready to leave  During group patient was intermittently speaking Sami during educational video

## 2018-09-11 NOTE — PLAN OF CARE
Problem: Ineffective Coping  Goal: Participates in unit activities  Interventions:  - Provide therapeutic environment   - Provide required programming   - Redirect inappropriate behaviors    Outcome: Progressing  Pt attends groups throughout the day with little encouragement  He presents as disorganized with difficulty focusing on presented tasks  Pt tangential at times often conversation does not relate to group discussion  Pt talks over staff/peers during group and becomes irritable at times with redirection  He appeared focused on discharge throughout group interactions

## 2018-09-11 NOTE — PROGRESS NOTES
Pt becoming loud, yelling  Rambling and nonsensical speech  Pt pacing halls  Offered prn medication  Pt began yelling at [address] and becoming more agitated  After some encouragement pt agreed to prn medication  Pt received prn haldol, ativan, and cogentin at 1230  Prns appear to have been mildly effective  Pt restless, walking halls, talking with roommate about not needing to take medications  Observed doing pull ups in doorway

## 2018-09-11 NOTE — PROGRESS NOTES
Writer was talking with the PT and PT got agitated because, family 302 him accordantly with him  PT said " I came voluntary nobody handcuff me "  PT during the day got loud in group time the writer sat down with him, because he was disruptive and he was able to be redirected  However, the writer was hearing PT conversation with his room mate and he said  " I am not crazy, and they want me crazy this is why I am not taking the medicines  Also the writer heard the PT talking to himself the writer asked him with who he was talking he said, " with the dead and the bad, but they are not let me hear  I have to get out of hear and if they not let me out I am going get out of hear  " It was observe the PT pretended shooting to staff and PT's in the unit   ( The writer hear the majority of this conversation in [address])

## 2018-09-12 PROCEDURE — 99232 SBSQ HOSP IP/OBS MODERATE 35: CPT | Performed by: PSYCHIATRY & NEUROLOGY

## 2018-09-12 RX ADMIN — BENZTROPINE MESYLATE 1 MG: 1 TABLET ORAL at 17:17

## 2018-09-12 RX ADMIN — LORAZEPAM 2 MG: 1 TABLET ORAL at 17:17

## 2018-09-12 RX ADMIN — HALOPERIDOL 10 MG: 5 TABLET ORAL at 17:17

## 2018-09-12 RX ADMIN — OLANZAPINE 5 MG: 5 TABLET, ORALLY DISINTEGRATING ORAL at 08:09

## 2018-09-12 RX ADMIN — CHLORPROMAZINE HYDROCHLORIDE 50 MG: 25 TABLET, SUGAR COATED ORAL at 10:31

## 2018-09-12 NOTE — PROGRESS NOTES
Prn thorazine provided at 10:31 for pt presenting with increasing agitation  Moderately effective relief noted

## 2018-09-12 NOTE — PROGRESS NOTES
Pt sleeping for only brief periods throughout the night  Labile when awake  Pt paranoid and suspicious of staff at times, asking staff if they stole his money  Argumentative at times however mostly redirectable  Did require security presence again overnight and seems to be more cooperative when security is present  Rambling, pressured speech  States "I am done with the Sikhism stuff, I wont take my meds anymore  You can give me the pills but they will go right here" (Pt opened mouth and pointed to side of mouth) pt then states "When I walk away from you guys, I will take the pill out and sell it on the streets  I'm going to make money off this shit " pt becoming loud on the unit and needing verbal deescalation  Also states not liking medications because he felt his tongue was "tied up" earlier however denies this currently  "I'm woke now " Pt offered dayroom and was sitting in this room for some time  Noted pt has numerous drawings on arms  Mumbling at times and appears to be RIS however adamantly refuses having AH and refuses medications  Explained he would need to remain in control and not loud on the unit then  Pt agreeable however does appear more gaurded with staff when redirected  Pt remains focused on wanting to be discharged from the hospital  Explained he would need to cooperate with treatment at this time  Pt continues to state "I dont need to be here  Get me the papers and I'm out " pt paces halls at times however cooperative with not walking up to doors per staffs request  Pt states "No, I wont try to run  I know I cant get out at night " Pt began to appear more tired with reddened eyes  Leanora Bough asleep for about one hour, woke up and walked halls briefly before returning back to bed  Currently sleeping

## 2018-09-12 NOTE — CASE MANAGEMENT
MALDONADO informed pt's sister Jerrod Dee ([phone]) that she will be required to attend the [facility] [place] [facility] Hearing on Friday morning  MALDONADO informed sister that the time of court hearing will not be known until Thursday afternoon  Pt's sister confirmed that pt has been diagnosed with Schizophrenia since aged 26 and has been hospitalized numerous times in [facility]  Pt recently moved to [place] in late 2017  Pt was discharged from hospital in [facility] earlier in 2017 with an outpatient court commitment to remain in treatment  Sister reports that pt is not currently in any treatment for mental health in [place]  MALDONADO will continue to follow

## 2018-09-12 NOTE — PLAN OF CARE
Problem: Alteration in Thoughts and Perception  Goal: Treatment Goal: Gain control of psychotic behaviors/thinking, reduce/eliminate presenting symptoms and demonstrate improved reality functioning upon discharge  Outcome: Progressing    Goal: Refrain from acting on delusional thinking/internal stimuli  Interventions:  - Monitor patient closely, per order   - Utilize least restrictive measures   - Set reasonable limits, give positive feedback for acceptable   - Administer medications as ordered and monitor of potential side effects   Outcome: Progressing    Goal: Agree to be compliant with medication regime, as prescribed and report medication side effects  Interventions:  - Offer appropriate PRN medication and supervise ingestion; conduct aims, as needed    Outcome: Progressing      Problem: Ineffective Coping  Goal: Identifies ineffective coping skills  Outcome: Progressing    Goal: Demonstrates healthy coping skills  Outcome: Progressing    Goal: Understands least restrictive measures  Interventions:  - Utilize least restrictive behavior   Outcome: Progressing    Goal: Free from restraint events  - Utilize least restrictive measures   - Provide behavioral interventions   - Redirect inappropriate behaviors    Outcome: Progressing    Goal: Participates in unit activities  Interventions:  - Provide therapeutic environment   - Provide required programming   - Redirect inappropriate behaviors    Outcome: Progressing      Problem: Depression  Goal: Verbalize thoughts and feelings  Interventions:  - Assess and re-assess patient's level of risk   - Engage patient in 1:1 interactions, daily, for a minimum of 15 minutes   - Encourage patient to express feelings, fears, frustrations, hopes    Outcome: Progressing    Goal: Refrain from harming self  Interventions:  - Monitor patient closely, per order   - Supervise medication ingestion, monitor effects and side effects    Outcome: Progressing    Goal: Refrain from self-neglect  Outcome: Progressing    Goal: Attend and participate in unit activities, including therapeutic, recreational, and educational groups  Interventions:  - Provide therapeutic and educational activities daily, encourage attendance and participation, and document same in the medical record    Outcome: Progressing      Problem: Risk for Violence/Aggression Toward Others  Goal: Refrain from harming others  Outcome: Progressing    Goal: Refrain from destructive acts on the environment or property  Outcome: Progressing      Problem: INVOLUNTARY ADMIT  Goal: Will cooperate with staff recommendations and doctor's orders and will demonstrate appropriate behavior  INTERVENTIONS:  - Treat underlying conditions and offer medication as ordered  - Educate regarding involuntary admission procedures and rules  - Utilize positive consistent limit setting strategies to support patient and staff safety   Outcome: Progressing      Problem: DISCHARGE PLANNING  Goal: Discharge to home or other facility with appropriate resources  INTERVENTIONS:  - Identify barriers to discharge w/patient and caregiver  - Arrange for needed discharge resources and transportation as appropriate  - Identify discharge learning needs (meds, wound care, etc )  - Arrange for interpretive services to assist at discharge as needed  - Refer to Case Management Department for coordinating discharge planning if the patient needs post-hospital services based on physician/advanced practitioner order or complex needs related to functional status, cognitive ability, or social support system   Outcome: Progressing

## 2018-09-12 NOTE — PROGRESS NOTES
PT expressed to this writer his frustration with the fact that he is here against his will  He said "you do not have God and heart with you  I am not fucking crazy and I want to go back to [facility]  I will not come back where they are taking my SSI, my money, giving me treatment that I do not need " The writer talk with the PT to the end of the shift   He said "YES I am annoying, but I can keeping together I am Shannan and I am loud that is normal for me "

## 2018-09-12 NOTE — PROGRESS NOTES
Progress Note - [address] [facility]  [address] [facility] 34 y o  male MRN: [id number]  Unit/Bed#: [facility] 253-01 Encounter: [id number]    Assessment/Plan   Principal Problem:    Psychosis   Rule out schizophrenia    Subjective:   Patient remained disorganized, labile, loud, irritable with evident paranoid ideation on the unit  Patient is seen talking to himself and pacing in the hallway  Patient got agitated yesterday requiring controlled team and multiple p r n  medication  Patient received multiple antipsychotics but remained with poor sleep last night  Today morning patient is pacing the hallway and appears suspicious and cautious of others  During evaluation with me patient got loud immediately and needed frequent redirection  Patient is not making sense and is having for evident flight of ideas  Patient talked about multiple topics not related to each others  I tried to educate him multiple times on 303 hearing and importance of medication compliance and appropriate behavior for mental health  to make their decision  Patient got loud when mental health  was discussed with him and he continues to perseverate over the fact that he is not psychotic  Patient expressing increased anger to word his sister for initiating the 302  Patient remained impulsive and at risk of danger to self and others at this time      Current Medications:    Current Facility-Administered Medications:  acetaminophen 650 mg Oral Q6H PRN Cheryle Rivas, PA-C   aluminum-magnesium hydroxide-simethicone 30 mL Oral Q4H PRN Cheryle Rivas, PA-C   benztropine 1 mg Intramuscular Q6H PRN Cheryle Rivas, PA-C   benztropine 1 mg Oral Q6H PRN Cheryle Rivas, PA-C   chlorproMAZINE 50 mg Intramuscular Q8H PRN Alvarado Bright   chlorproMAZINE 50 mg Oral Q8H PRN Alvarado Bright   haloperidol 10 mg Oral Q8H PRN Alvarado Brgiht   haloperidol lactate 5 mg Intramuscular Q6H PRN Cheryle Rivas, PA-C   LORazepam 2 mg Intramuscular Q6H PRN Kari Chelita, PA-C   LORazepam 2 mg Oral Q6H PRN [facility]   magnesium hydroxide 30 mL Oral Daily PRN Kari Chelita, PA-C   nicotine polacrilex 2 mg Oral Q2H PRN Kari Chelita, PA-C   [facility]   OLANZapine 5 mg Oral Daily [facility]   OLANZapine 10 mg Intramuscular Q3H PRN Kari Chelita, PA-C   OLANZapine 10 mg Oral Q3H PRN Kari Chelita, PA-C   traZODone 50 mg Oral HS PRN Kari Chelita, PA-C       Behavioral Health Medications: all current active meds have been reviewed  Vital signs in last 24 hours:  Temp:  [96 °F (35 6 °C)-97 9 °F (36 6 °C)] 96 °F (35 6 °C)  HR:  [] 120  Resp:  [16-18] 16  BP: (126-134)/(83-85) 134/85    Laboratory results:    I have personally reviewed all pertinent laboratory/tests results    Labs in last 72 hours:   Recent Labs      09/11/18   0624   WBC  7 33   RBC  5 15   HGB  16 0   HCT  47 5   PLT  219   RDW  12 4   NEUTROABS  4 44   NA  140   K  4 0   CL  103   CO2  29   BUN  12   CREATININE  0 83   GLUC  99   GLUF  99   CALCIUM  8 8   AST  22   ALT  25   ALKPHOS  95   TP  7 0   ALB  3 5   TBILI  0 30   CHOLESTEROL  118   HDL  37*   TRIG  49   LDLCALC  71   [id number]  2 061   RPR  Non-Reactive     Admission Labs:   Admission on 09/10/2018   Component Date Value    Color, UA 09/10/2018 Yellow     Clarity, UA 09/10/2018 Clear     Specific Gravity, UA 09/10/2018 1 020     pH, UA 09/10/2018 7 0     Leukocytes, UA 09/10/2018 Negative     Nitrite, UA 09/10/2018 Negative     Protein, UA 09/10/2018 Negative     Glucose, UA 09/10/2018 Negative     Ketones, UA 09/10/2018 Negative     Urobilinogen, UA 09/10/2018 1 0     Bilirubin, UA 09/10/2018 Negative     Blood, UA 09/10/2018 Trace-Intact*    RBC, UA 09/10/2018 2-4*    WBC, UA 09/10/2018 1-2*    Epithelial Cells 09/10/2018 Occasional     Bacteria, UA 09/10/2018 None Seen     WBC 09/11/2018 7 33     RBC 09/11/2018 5 15     Hemoglobin 09/11/2018 16 0     Hematocrit 09/11/2018 47 5     MCV 09/11/2018 92     MCH 09/11/2018 31 1     MCHC 09/11/2018 33 7     RDW 09/11/2018 12 4     MPV 09/11/2018 9 8     Platelets 70/70/9478 219     nRBC 09/11/2018 0     Neutrophils Relative 09/11/2018 61     Immat GRANS % 09/11/2018 0     Lymphocytes Relative 09/11/2018 29     Monocytes Relative 09/11/2018 6     Eosinophils Relative 09/11/2018 3     Basophils Relative 09/11/2018 1     Neutrophils Absolute 09/11/2018 4 44     Immature Grans Absolute 09/11/2018 0 03     Lymphocytes Absolute 09/11/2018 2 13     Monocytes Absolute 09/11/2018 0 45     Eosinophils Absolute 09/11/2018 0 22     Basophils Absolute 09/11/2018 0 06     Sodium 09/11/2018 140     Potassium 09/11/2018 4 0     Chloride 09/11/2018 103     CO2 09/11/2018 29     ANION GAP 09/11/2018 8     BUN 09/11/2018 12     Creatinine 09/11/2018 0 83     Glucose 09/11/2018 99     Glucose, Fasting 09/11/2018 99     Calcium 09/11/2018 8 8     AST 09/11/2018 22     ALT 09/11/2018 25     Alkaline Phosphatase 09/11/2018 95     Total Protein 09/11/2018 7 0     Albumin 09/11/2018 3 5     Total Bilirubin 09/11/2018 0 30     eGFR 09/11/2018 121     Cholesterol 09/11/2018 118     Triglycerides 09/11/2018 49     HDL, Direct 09/11/2018 37*    LDL Calculated 09/11/2018 71     Non-HDL-Chol (CHOL-HDL) 09/11/2018 81     RPR 09/11/2018 Non-Reactive     Hemoglobin A1C 09/11/2018 5 3     EAG 09/11/2018 105     TSH 3RD GENERATON 09/11/2018 2 061        Psychiatric Review of Systems:  Behavior over the last 24 hours:  unchanged  Sleep: insomnia  Appetite: normal  Medication side effects: No  ROS: no complaints    Mental Status Evaluation:  Appearance:  casually dressed   Behavior:  psychomotor agitation   Speech:  loud   Mood:  euphoric   Affect:  labile   Language rapid   Thought Process:  circumstantial and disorganized   Thought Content:  delusions  persecutory   Perceptual Disturbances:  Auditory hallucinations without commands   Risk Potential: Suicidal Ideations without plan, Homicidal Ideations none and Potential for Aggression No   Sensorium:  person and place   Cognition:  grossly intact   Consciousness:  awake    Attention: attention span appeared shorter than expected for age   Insight:  limited   Judgment: limited   Intellect limited   Gait/Station: normal gait/station   Motor Activity: no abnormal movements     Memory: Short and long term memory  fair     Progress Toward Goals: not progressing    Recommended Treatment:   Increase olanzapine to 5 mg a m  and 15 mg after dinner for mood stabilization and psychosis management  Continue with group therapy, milieu therapy and occupational therapy      Continue following current medications:   Current Facility-Administered Medications:  acetaminophen 650 mg Oral Q6H PRN Kari Chelita, PA-C   aluminum-magnesium hydroxide-simethicone 30 mL Oral Q4H PRN Kari Chelita, PA-C   benztropine 1 mg Intramuscular Q6H PRN Kari Chelita, PA-C   benztropine 1 mg Oral Q6H PRN Kari Chelita, PA-C   chlorproMAZINE 50 mg Intramuscular Q8H PRN [facility]   chlorproMAZINE 50 mg Oral Q8H PRN [facility]   haloperidol 10 mg Oral Q8H PRN [facility]   haloperidol lactate 5 mg Intramuscular Q6H PRN Kari Chelita, PA-C   LORazepam 2 mg Intramuscular Q6H PRN Kari Chelita, PA-C   LORazepam 2 mg Oral Q6H PRN [facility]   magnesium hydroxide 30 mL Oral Daily PRN Kari Chelita, PA-C   nicotine polacrilex 2 mg Oral Q2H PRN Kari Chelita, PA-C   OLANZapine 15 mg Oral HS [facility]   OLANZapine 5 mg Oral Daily [facility]   OLANZapine 10 mg Intramuscular Q3H PRN Kari Chelita, PA-C   OLANZapine 10 mg Oral Q3H PRN Kari Chelita, PA-C   traZODone 50 mg Oral HS PRN Kari Chelita, PA-C       Risks, benefits and possible side effects of Medications:   Risks, benefits, and possible side effects of medications explained to patient and patient verbalizes understanding  This note has been constructed using a voice recognition system  There may be translation, syntax,  or grammatical errors  If you have any questions, please contact the dictating provider

## 2018-09-12 NOTE — PROGRESS NOTES
Pt awake at beginning of shift pacing halls, went to room and states to roommate "They are leaving, I need to go " pt then came out of room stating he "will break out of here" pt spoke with staff briefly however watching door when evening staff were leaving  Security was called for extra staff presence  Pt requested snack which was given, Explained to pt he should try and sleep, pt staring at staff and appears irritable and gaurded however did go to room  Refused PRN to help sleep at this time stating he wont take medications anymore  Currently in room, will monitor

## 2018-09-12 NOTE — PROGRESS NOTES
Pt loud, irritable, angry  Pacing halls throughout morning  [place]  Observed RIS  Given prn thorazine for increasing agitation at 1031

## 2018-09-12 NOTE — PROGRESS NOTES
Pt becoming increasingly agitated wanting to leave  Not able to be redirected paranoid and threatening to hurt staff if he is attacked given haldol, cogentin and ativan po to help maintain control , became more agitated when security walked through

## 2018-09-13 PROCEDURE — 99232 SBSQ HOSP IP/OBS MODERATE 35: CPT | Performed by: PSYCHIATRY & NEUROLOGY

## 2018-09-13 RX ORDER — OLANZAPINE 10 MG/1
20 TABLET, ORALLY DISINTEGRATING ORAL
Status: DISCONTINUED | OUTPATIENT
Start: 2018-09-13 | End: 2018-09-27 | Stop reason: HOSPADM

## 2018-09-13 RX ADMIN — OLANZAPINE 5 MG: 5 TABLET, ORALLY DISINTEGRATING ORAL at 09:07

## 2018-09-13 RX ADMIN — OLANZAPINE 15 MG: 10 TABLET, ORALLY DISINTEGRATING ORAL at 00:48

## 2018-09-13 RX ADMIN — LORAZEPAM 2 MG: 1 TABLET ORAL at 13:00

## 2018-09-13 RX ADMIN — CHLORPROMAZINE HYDROCHLORIDE 50 MG: 25 TABLET, SUGAR COATED ORAL at 10:27

## 2018-09-13 RX ADMIN — CHLORPROMAZINE HYDROCHLORIDE 50 MG: 25 TABLET, SUGAR COATED ORAL at 18:44

## 2018-09-13 RX ADMIN — OLANZAPINE 20 MG: 10 TABLET, ORALLY DISINTEGRATING ORAL at 21:44

## 2018-09-13 NOTE — PROGRESS NOTES
Pt acting erratic and rambling  Stayed calm for the most part  Eye contact fair  Looks away while talking  Trying to relay that we are here to help him  He is not in agreement  Denied SI, HI and AVH  Wandering around unit, speaking Nepali, near nurse's station

## 2018-09-13 NOTE — PROGRESS NOTES
Progress Note [facility]  [address] [facility] 34 y o  male MRN: [id number]  Unit/Bed#: [facility] 253-01 Encounter: [id number]    Assessment/Plan   Principal Problem:    Psychosis   Rule out schizophrenia    Subjective:   Patient is compliant with medications with no acute side effects  Patient remained agitated, labile with disorganized speech and thought process  Patient remained with rapid speech needing frequent redirection  Patient continues to report that he is not psychotic and he will be going back to [facility] where he is not consider psychotic for these behaviors  Patient remained with flight of ideas  Patient was also seen with Yuli Coe who understand [place] well  According to her patient speech is disorganized for most part  I attempted to educate patient on 303 hearing tomorrow but patient got agitated and not willing to discuss this with me and remained focused on discharge      Current Medications:    Current Facility-Administered Medications:  acetaminophen 650 mg Oral Q6H PRN Wildenisseia David, PA-C   aluminum-magnesium hydroxide-simethicone 30 mL Oral Q4H PRN Jamie Hernandez, PA-C   benztropine 1 mg Intramuscular Q6H PRN Wilfelipemenia David, PA-C   benztropine 1 mg Oral Q6H PRN Wilfelipemenia Patricekusimj, PA-C   chlorproMAZINE 50 mg Intramuscular Q8H PRN [facility]   chlorproMAZINE 50 mg Oral Q8H PRN [facility]   haloperidol 10 mg Oral Q8H PRN [facility]   haloperidol lactate 5 mg Intramuscular Q6H PRN Wildenisseia Patricekusimj, PA-C   LORazepam 2 mg Intramuscular Q6H PRN Christianoia Patricekusimj, PA-C   LORazepam 2 mg Oral Q6H PRN [facility]   magnesium hydroxide 30 mL Oral Daily PRN Wilfelipemenia Patricekusick, PA-C   nicotine polacrilex 2 mg Oral Q2H PRN Margaritamenia Patricekfermin, PA-C   OLANZapine 15 mg Oral HS [facility]   OLANZapine 5 mg Oral Daily [facility]   OLANZapine 10 mg Intramuscular Q3H PRN Wilhelmenia Mckusick, PA-C   OLANZapine 10 mg Oral Q3H PRN Wilhelmenia Mckusick, PA-C   traZODone 50 mg Oral HS PRN Maria A Chahal PA-C       Behavioral Health Medications: all current active meds have been reviewed  Vital signs in last 24 hours:  Temp:  [97 2 °F (36 2 °C)-97 4 °F (36 3 °C)] 97 4 °F (36 3 °C)  HR:  [79-86] 79  Resp:  [16-18] 18  BP: (131-149)/(82-93) 131/82    Laboratory results:    I have personally reviewed all pertinent laboratory/tests results    Labs in last 72 hours:   Recent Labs      09/11/18   0624   WBC  7 33   RBC  5 15   HGB  16 0   HCT  47 5   PLT  219   RDW  12 4   NEUTROABS  4 44   NA  140   K  4 0   CL  103   CO2  29   BUN  12   CREATININE  0 83   GLUC  99   GLUF  99   CALCIUM  8 8   AST  22   ALT  25   ALKPHOS  95   TP  7 0   ALB  3 5   TBILI  0 30   CHOLESTEROL  118   HDL  37*   TRIG  49   LDLCALC  71   [id number]  2 061   RPR  Non-Reactive     Admission Labs:   Admission on 09/10/2018   Component Date Value    Color, UA 09/10/2018 Yellow     Clarity, UA 09/10/2018 Clear     Specific Gravity, UA 09/10/2018 1 020     pH, UA 09/10/2018 7 0     Leukocytes, UA 09/10/2018 Negative     Nitrite, UA 09/10/2018 Negative     Protein, UA 09/10/2018 Negative     Glucose, UA 09/10/2018 Negative     Ketones, UA 09/10/2018 Negative     Urobilinogen, UA 09/10/2018 1 0     Bilirubin, UA 09/10/2018 Negative     Blood, UA 09/10/2018 Trace-Intact*    RBC, UA 09/10/2018 2-4*    WBC, UA 09/10/2018 1-2*    Epithelial Cells 09/10/2018 Occasional     Bacteria, UA 09/10/2018 None Seen     WBC 09/11/2018 7 33     RBC 09/11/2018 5 15     Hemoglobin 09/11/2018 16 0     Hematocrit 09/11/2018 47 5     MCV 09/11/2018 92     MCH 09/11/2018 31 1     MCHC 09/11/2018 33 7     RDW 09/11/2018 12 4     MPV 09/11/2018 9 8     Platelets 60/34/8038 219     nRBC 09/11/2018 0     Neutrophils Relative 09/11/2018 61     Immat GRANS % 09/11/2018 0     Lymphocytes Relative 09/11/2018 29     Monocytes Relative 09/11/2018 6     Eosinophils Relative 09/11/2018 3     Basophils Relative 09/11/2018 1     Neutrophils Absolute 09/11/2018 4 44     Immature Grans Absolute 09/11/2018 0 03     Lymphocytes Absolute 09/11/2018 2 13     Monocytes Absolute 09/11/2018 0 45     Eosinophils Absolute 09/11/2018 0 22     Basophils Absolute 09/11/2018 0 06     Sodium 09/11/2018 140     Potassium 09/11/2018 4 0     Chloride 09/11/2018 103     CO2 09/11/2018 29     ANION GAP 09/11/2018 8     BUN 09/11/2018 12     Creatinine 09/11/2018 0 83     Glucose 09/11/2018 99     Glucose, Fasting 09/11/2018 99     Calcium 09/11/2018 8 8     AST 09/11/2018 22     ALT 09/11/2018 25     Alkaline Phosphatase 09/11/2018 95     Total Protein 09/11/2018 7 0     Albumin 09/11/2018 3 5     Total Bilirubin 09/11/2018 0 30     eGFR 09/11/2018 121     Cholesterol 09/11/2018 118     Triglycerides 09/11/2018 49     HDL, Direct 09/11/2018 37*    LDL Calculated 09/11/2018 71     Non-HDL-Chol (CHOL-HDL) 09/11/2018 81     RPR 09/11/2018 Non-Reactive     Hemoglobin A1C 09/11/2018 5 3     EAG 09/11/2018 105     TSH 3RD GENERATON 09/11/2018 2 061        Psychiatric Review of Systems:  Behavior over the last 24 hours:  unchanged  Sleep: insomnia  Appetite: normal  Medication side effects: No  ROS: no complaints    Mental Status Evaluation:  Appearance:  casually dressed   Behavior:  guarded   Speech:  loud   Mood:  angry and anxious   Affect:  labile   Language rapid   Thought Process:  circumstantial and disorganized   Thought Content:  delusions  persecutory   Perceptual Disturbances:  Auditory hallucinations without commands   Risk Potential: Suicidal Ideations none, Homicidal Ideations none and Potential for Aggression Yes related to aggression and psychosis   Sensorium:  person   Cognition:  grossly intact   Consciousness:  awake    Attention: attention span appeared shorter than expected for age   Insight:  limited   Judgment: limited   Intellect limited   Gait/Station: normal gait/station   Motor Activity: no abnormal movements     Memory: Short and long term memory  fair     Progress Toward Goals: slow progress    Recommended Treatment:   Increase olanzapine to 5 mg a m  -20 mg at HS for psychosis management  Continue with group therapy, milieu therapy and occupational therapy  Continue following current medications:   Current Facility-Administered Medications:  acetaminophen 650 mg Oral Q6H PRN Leif Crease, PA-C   aluminum-magnesium hydroxide-simethicone 30 mL Oral Q4H PRN Leif Crease, PA-C   benztropine 1 mg Intramuscular Q6H PRN Leif Crease, PA-C   benztropine 1 mg Oral Q6H PRN Leif Crease, PA-C   chlorproMAZINE 50 mg Intramuscular Q8H PRN [facility]   chlorproMAZINE 50 mg Oral Q8H PRN [facility]   haloperidol 10 mg Oral Q8H PRN [facility]   haloperidol lactate 5 mg Intramuscular Q6H PRN Leif Crease, PA-C   LORazepam 2 mg Intramuscular Q6H PRN Leif Crease, PA-C   LORazepam 2 mg Oral Q6H PRN [facility]   magnesium hydroxide 30 mL Oral Daily PRN Leif Crease, PA-C   nicotine polacrilex 2 mg Oral Q2H PRN Leif Crease, PA-C   OLANZapine 15 mg Oral HS [facility]   OLANZapine 5 mg Oral Daily [facility]   OLANZapine 10 mg Intramuscular Q3H PRN Leif Crease, PA-C   OLANZapine 10 mg Oral Q3H PRN Leif Crease, PA-C   traZODone 50 mg Oral HS PRN Leif Crease, PA-C       Risks, benefits and possible side effects of Medications:   Risks, benefits, and possible side effects of medications explained to patient and patient verbalizes understanding  This note has been constructed using a voice recognition system  There may be translation, syntax,  or grammatical errors  If you have any questions, please contact the dictating provider

## 2018-09-13 NOTE — PROGRESS NOTES
Pt woke up once overnight and approached desk asking for snack  Mumbled speech and appears tired  Given small snack and encouraged back to bed  Pt cooperative with this and returned to bed shortly after snack  Slept throughout the remainder of the night

## 2018-09-13 NOTE — PROGRESS NOTES
PT approached several times during the day to the writer  PT said" I am not an animal I am a human being  You people treat me as I am sick I am not sick [place] said that I am sick  If you are going to give me medicine give me the one that I want, Methadone, and if you do not want to give me Methadone I will use  [place]  You know I help myself  Pt asked the writer at approximate 1630 , Is tomorrow when the  is coming? The writer said yes, and it is important that you work with them and try to listen  PT got agitated and started to scream, saying " you are and, together with my family, all want me locked down  I aim to kill them, yes I am going to kill them  I know, I know  The conversation was in [address] [facility]

## 2018-09-13 NOTE — PROGRESS NOTES
Pt wandering and restless at times  Denies SI HI and hallucinations  Pt needed redirection to leave am group and was upset and loud with it  Spoke with pt for verbal de-escalaltion  He was agitated, rambling in speech and sometimes uncomprehenseable when speaking about family issues and his sister  Pt received prn medication which he dumped in cup of H20 and then drank it

## 2018-09-13 NOTE — PLAN OF CARE
Problem: DISCHARGE PLANNING  Goal: Discharge to home or other facility with appropriate resources  INTERVENTIONS:  - Identify barriers to discharge w/patient and caregiver  - Arrange for needed discharge resources and transportation as appropriate  - Identify discharge learning needs (meds, wound care, etc )  - Arrange for interpretive services to assist at discharge as needed  - Refer to Case Management Department for coordinating discharge planning if the patient needs post-hospital services based on physician/advanced practitioner order or complex needs related to functional status, cognitive ability, or social support system   Outcome: Progressing  Pt signed TIA for SO he refers to as mother of his baby  No response to calls made on 9/12/18   303 hearing is arranged for Friday September 14th  Time to be announced  Pt's sister will be the petitioner in the hearing  Pt is still incoherent and difficult to understand

## 2018-09-14 PROBLEM — F20.0 PARANOID SCHIZOPHRENIA (HCC): Status: ACTIVE | Noted: 2018-09-11

## 2018-09-14 PROCEDURE — 99232 SBSQ HOSP IP/OBS MODERATE 35: CPT | Performed by: PSYCHIATRY & NEUROLOGY

## 2018-09-14 RX ADMIN — LORAZEPAM 2 MG: 1 TABLET ORAL at 10:11

## 2018-09-14 RX ADMIN — CHLORPROMAZINE HYDROCHLORIDE 50 MG: 25 TABLET, SUGAR COATED ORAL at 07:35

## 2018-09-14 RX ADMIN — LORAZEPAM 2 MG: 1 TABLET ORAL at 17:56

## 2018-09-14 RX ADMIN — OLANZAPINE 20 MG: 10 TABLET, ORALLY DISINTEGRATING ORAL at 22:20

## 2018-09-14 RX ADMIN — OLANZAPINE 5 MG: 5 TABLET, ORALLY DISINTEGRATING ORAL at 09:04
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
## 2018-09-14 NOTE — CASE MANAGEMENT
Lysle Hodgkin from [facility] called to say the hearing will be at 9:30 this morning  SW outreached to the petitioner, pt's sister Kavita Wolfe ([phone]) who will be present for the court hearing and said she will aim to be at the hospital at 9:25

## 2018-09-14 NOTE — PROGRESS NOTES
Pt loud and yelling at RN station this morning  Asked for socks, then began yelling that writer is in the [place]  Alternates between speaking [place] and [place]  Difficult to follow  Pt pacing halls  Given thorazine prn for increasing agitation

## 2018-09-14 NOTE — PROGRESS NOTES
Pt is anxious, agitated, but redirectable,then smiling/laughing during interactions  Ativan given at 1756  Pt at nurse's station asking to leave  Had 303 hearing today, pt verbalizes wanting to leave Monday or Tuesday, wants to sign a 72hour  Encouraged to speak with doctor tomorrow, but according to 303 pt has IP stay of up to 20 days  Pt is denying SI, HI, AVH  Observed talking to self throughout shift in [place] and [address]  Music in quiet room used as a coping skill

## 2018-09-14 NOTE — PROGRESS NOTES
Pt received several prn medications throughout morning  Loud and pacing hallways  Had 303 hearing  No physically aggressive behaviors  Declined to visit with mom when offered visit after hearing

## 2018-09-14 NOTE — CASE MANAGEMENT
SW met with pt to discuss court hearing  Pt understands that he will be I/P for up to 20 days depending on his response to treatment  Pt understood this but thinks that the  at the court this morning was not a real  and the  representing him was not a real   Pt did not want to entertain any other conclusions so SW finished the conversation  Pt remains frustrated, asking for discharge and is generally agitated and irritable  SW will continue to follow

## 2018-09-14 NOTE — PLAN OF CARE
Problem: Ineffective Coping  Goal: Participates in unit activities  Interventions:  - Provide therapeutic environment   - Provide required programming   - Redirect inappropriate behaviors    Outcome: Progressing  Pt attended afternoon group today  He continues to present as disorganized in discussion, however no agitation or irritability observed throughout group  He was able to follow simple 2 step instruction to complete presented tasks and responded well to redirection

## 2018-09-14 NOTE — PROGRESS NOTES
Pt  spit while Zyprexa ODT tablet was disolving under tongue  Unsure how much medication pt  actually received

## 2018-09-14 NOTE — PROGRESS NOTES
[facility] Note - [facility]  [address] [facility] 34 y o  male MRN: [id number]  Unit/Bed#: [facility] 253-01 Encounter: [id number]    Assessment/Plan   Principal Problem:    Paranoid schizophrenia ([address] )    Subjective:   Patient is compliant with medications with no acute side effects  Patient remained labile, intrusive, agitated with internal preoccupation  Continues to need p r n  medication and frequent redirection to prevent worsening of aggression  303 hearing was held on the unit and patient is committed for period not to exceed 20 days due to persistence of aggression and noncompliance with medications  Patient remained with poor insight into need for treatment at this time  Current Medications:    Current Facility-Administered Medications:  acetaminophen 650 mg Oral Q6H PRN Bella Prior, PA-C   aluminum-magnesium hydroxide-simethicone 30 mL Oral Q4H PRN Bella Prior, PA-C   benztropine 1 mg Intramuscular Q6H PRN Bella Prior, PA-C   benztropine 1 mg Oral Q6H PRN Bella Prior, PA-C   chlorproMAZINE 50 mg Intramuscular Q8H PRN [facility]   chlorproMAZINE 50 mg Oral Q8H PRN [facility]   haloperidol 10 mg Oral Q8H PRN [facility]   haloperidol lactate 5 mg Intramuscular Q6H PRN Bella Prior, PA-C   LORazepam 2 mg Intramuscular Q6H PRN Bella Prior, PA-C   LORazepam 2 mg Oral Q6H PRN [facility]   magnesium hydroxide 30 mL Oral Daily PRN Bella Prior, PA-C   nicotine polacrilex 2 mg Oral Q2H PRN Bella Prior, PA-C   OLANZapine 20 mg Oral HS [facility]   OLANZapine 5 mg Oral Daily [facility]   OLANZapine 10 mg Intramuscular Q3H PRN Bella Prior, PA-C   OLANZapine 10 mg Oral Q3H PRN Bella Prior, PA-C   traZODone 50 mg Oral HS PRN Bella Prior, PA-C       Behavioral Health Medications: all current active meds have been reviewed      Vital signs in last 24 hours:  Temp:  [96 °F (35 6 °C)-97 4 °F (36 3 °C)] 96 °F (35 6 °C)  HR: [] 64  Resp:  [18-20] 18  BP: (141-142)/(70-83) 142/70    Laboratory results:    I have personally reviewed all pertinent laboratory/tests results  Labs in last 72 hours: No results for input(s): WBC, RBC, HGB, HCT, PLT, RDW, NEUTROABS, NA, K, CL, CO2, BUN, CREATININE, GLUC, GLUF, CALCIUM, AST, ALT, ALKPHOS, TP, ALB, TBILI, BILIDIR, CHOLESTEROL, HDL, TRIG, LDLCALC, VALPROICTOT, CARBAMAZEPIN, LITHIUM, AMMONIA, [id number], FREET4, T3FREE, PREGTESTUR, PREGSERUM, HCG, HCGQUANT, RPR in the last 72 hours      Invalid input(s):  RBC  Admission Labs:   Admission on 09/10/2018   Component Date Value    Color, UA 09/10/2018 Yellow     Clarity, UA 09/10/2018 Clear     Specific Gravity, UA 09/10/2018 1 020     pH, UA 09/10/2018 7 0     Leukocytes, UA 09/10/2018 Negative     Nitrite, UA 09/10/2018 Negative     Protein, UA 09/10/2018 Negative     Glucose, UA 09/10/2018 Negative     Ketones, UA 09/10/2018 Negative     Urobilinogen, UA 09/10/2018 1 0     Bilirubin, UA 09/10/2018 Negative     Blood, UA 09/10/2018 Trace-Intact*    RBC, UA 09/10/2018 2-4*    WBC, UA 09/10/2018 1-2*    Epithelial Cells 09/10/2018 Occasional     Bacteria, UA 09/10/2018 None Seen     WBC 09/11/2018 7 33     RBC 09/11/2018 5 15     Hemoglobin 09/11/2018 16 0     Hematocrit 09/11/2018 47 5     MCV 09/11/2018 92     MCH 09/11/2018 31 1     MCHC 09/11/2018 33 7     RDW 09/11/2018 12 4     MPV 09/11/2018 9 8     Platelets 02/99/6673 219     nRBC 09/11/2018 0     Neutrophils Relative 09/11/2018 61     Immat GRANS % 09/11/2018 0     Lymphocytes Relative 09/11/2018 29     Monocytes Relative 09/11/2018 6     Eosinophils Relative 09/11/2018 3     Basophils Relative 09/11/2018 1     Neutrophils Absolute 09/11/2018 4 44     Immature Grans Absolute 09/11/2018 0 03     Lymphocytes Absolute 09/11/2018 2 13     Monocytes Absolute 09/11/2018 0 45     Eosinophils Absolute 09/11/2018 0 22     Basophils Absolute 09/11/2018 0 06  Sodium 09/11/2018 140     Potassium 09/11/2018 4 0     Chloride 09/11/2018 103     CO2 09/11/2018 29     ANION GAP 09/11/2018 8     BUN 09/11/2018 12     Creatinine 09/11/2018 0 83     Glucose 09/11/2018 99     Glucose, Fasting 09/11/2018 99     Calcium 09/11/2018 8 8     AST 09/11/2018 22     ALT 09/11/2018 25     Alkaline Phosphatase 09/11/2018 95     Total Protein 09/11/2018 7 0     Albumin 09/11/2018 3 5     Total Bilirubin 09/11/2018 0 30     eGFR 09/11/2018 121     Cholesterol 09/11/2018 118     Triglycerides 09/11/2018 49     HDL, Direct 09/11/2018 37*    LDL Calculated 09/11/2018 71     Non-HDL-Chol (CHOL-HDL) 09/11/2018 81     RPR 09/11/2018 Non-Reactive     Hemoglobin A1C 09/11/2018 5 3     EAG 09/11/2018 105     TSH 3RD GENERATON 09/11/2018 2 061        Psychiatric Review of Systems:  Behavior over the last 24 hours:  unchanged  Sleep: normal  Appetite: normal  Medication side effects: No  ROS: no complaints    Mental Status Evaluation:  Appearance:  casually dressed   Behavior:  psychomotor agitation   Speech:  loud   Mood:  angry, anxious and depressed   Affect:  increased in range   Language naming objects, repeating phrases and rapid   Thought Process:  circumstantial and disorganized   Thought Content:  delusions  persecutory   Perceptual Disturbances: Auditory hallucinations without commands   Risk Potential: Suicidal Ideations without plan, Homicidal Ideations none and Potential for Aggression No   Sensorium:  person and place   Cognition:  grossly intact   Consciousness:  awake    Attention: attention span appeared shorter than expected for age   Insight:  limited   Judgment: limited   Intellect fair   Gait/Station: normal gait/station   Motor Activity: no abnormal movements     Memory: Short and long term memory  fair     Progress Toward Goals: slow progress    Recommended Treatment:   Continue with group therapy, milieu therapy and occupational therapy      Continue following current medications:   Current Facility-Administered Medications:  acetaminophen 650 mg Oral Q6H PRN Emilyrene Carpen, PA-C   aluminum-magnesium hydroxide-simethicone 30 mL Oral Q4H PRN Emilyrene Carpen, PA-C   benztropine 1 mg Intramuscular Q6H PRN Emilyrene Carpen, PA-C   benztropine 1 mg Oral Q6H PRN Emilyrene Carpen, PA-C   chlorproMAZINE 50 mg Intramuscular Q8H PRN [facility]   chlorproMAZINE 50 mg Oral Q8H PRN [facility]   haloperidol 10 mg Oral Q8H PRN [facility]   haloperidol lactate 5 mg Intramuscular Q6H PRN Emilyrene Carpen, PA-C   LORazepam 2 mg Intramuscular Q6H PRN Thiago Carr, PA-C   LORazepam 2 mg Oral Q6H PRN [facility]   magnesium hydroxide 30 mL Oral Daily PRN Thiago Carpmarley, PA-C   nicotine polacrilex 2 mg Oral Q2H PRN Thiago Carr, PA-C   OLANZapine 20 mg Oral HS [facility]   OLANZapine 5 mg Oral Daily [facility]   OLANZapine 10 mg Intramuscular Q3H PRN Jorene Carpen, PA-C   OLANZapine 10 mg Oral Q3H PRN Jorene Carpmarley, PA-C   traZODone 50 mg Oral HS PRN Thiago Carr, PA-C       Risks, benefits and possible side effects of Medications:   Risks, benefits, and possible side effects of medications explained to patient and patient verbalizes understanding  This note has been constructed using a voice recognition system  There may be translation, syntax,  or grammatical errors  If you have any questions, please contact the dictating provider

## 2018-09-15 PROCEDURE — 99232 SBSQ HOSP IP/OBS MODERATE 35: CPT | Performed by: PSYCHIATRY & NEUROLOGY

## 2018-09-15 RX ORDER — OLANZAPINE 5 MG/1
5 TABLET, ORALLY DISINTEGRATING ORAL
Status: DISCONTINUED | OUTPATIENT
Start: 2018-09-15 | End: 2018-09-27 | Stop reason: HOSPADM

## 2018-09-15 RX ORDER — CHLORPROMAZINE HYDROCHLORIDE 100 MG/1
100 TABLET, FILM COATED ORAL EVERY 8 HOURS PRN
Status: DISCONTINUED | OUTPATIENT
Start: 2018-09-15 | End: 2018-09-21

## 2018-09-15 RX ADMIN — NICOTINE POLACRILEX 2 MG: 2 GUM, CHEWING BUCCAL at 15:59

## 2018-09-15 RX ADMIN — OLANZAPINE 5 MG: 5 TABLET, ORALLY DISINTEGRATING ORAL at 14:19

## 2018-09-15 RX ADMIN — LORAZEPAM 2 MG: 1 TABLET ORAL at 12:11

## 2018-09-15 RX ADMIN — CHLORPROMAZINE HYDROCHLORIDE 50 MG: 25 TABLET, SUGAR COATED ORAL at 06:10

## 2018-09-15 RX ADMIN — LORAZEPAM 2 MG: 1 TABLET ORAL at 19:29

## 2018-09-15 RX ADMIN — OLANZAPINE 5 MG: 5 TABLET, ORALLY DISINTEGRATING ORAL at 09:23

## 2018-09-15 RX ADMIN — OLANZAPINE 20 MG: 10 TABLET, ORALLY DISINTEGRATING ORAL at 20:55

## 2018-09-15 NOTE — PROGRESS NOTES
Progress Note - [address] [facility] [place]  [address] [facility] 34 y o  male MRN: [id number]  Unit/Bed#: [facility] 253-01 Encounter: [id number]    Assessment/Plan   Principal Problem:    Paranoid schizophrenia ([address] )      Subjective:   Patient is compliant with medication with no acute side effects  Patient remained agitated, labile and intrusive  Patient got loud with me and came very close in threatening manner  Patient needed staff intervention and later came back knocking loudly on my door  Patient remained child-like and intrusive with poor insight regarding need for admission and is focused on discharge  Patient needed frequent p r n  medication to reduce the level of agitation and aggression  According to staff patient is seen talking to self in the milieu  Current Medications:    Current Facility-Administered Medications:  acetaminophen 650 mg Oral Q6H PRN Star Godfrey, PA-C   aluminum-magnesium hydroxide-simethicone 30 mL Oral Q4H PRN Star Godfrey, PA-C   benztropine 1 mg Intramuscular Q6H PRN Star Godfrey, PA-C   benztropine 1 mg Oral Q6H PRN Star Godfrey, PA-C   chlorproMAZINE 50 mg Intramuscular Q8H PRN Alvarado Bright   chlorproMAZINE 50 mg Oral Q8H PRN Alvarado Bright   haloperidol 10 mg Oral Q8H PRN Alvarado Bright   haloperidol lactate 5 mg Intramuscular Q6H PRN Star Godfrey, PA-C   LORazepam 2 mg Intramuscular Q6H PRN Star Godfrey, PA-C   LORazepam 2 mg Oral Q6H PRN Alvarado Bright   magnesium hydroxide 30 mL Oral Daily PRN Star Godfrey PA-C   nicotine polacrilex 2 mg Oral Q2H PRN Star Godfrey, PA-C   OLANZapine 20 mg Oral HS [facility]   OLANZapine 5 mg Oral BID (AM & Afternoon) Alvarado Bright   OLANZapine 10 mg Intramuscular Q3H PRN Star Godfrey, PA-C   OLANZapine 10 mg Oral Q3H PRN Star Godfrey, PA-C   traZODone 50 mg Oral HS PRN Star Godfrey, PA-C       Behavioral Health Medications: all current active meds have been reviewed      Vital signs in last 24 hours:  Temp:  [96 7 °F (35 9 °C)-97 3 °F (36 3 °C)] 96 7 °F (35 9 °C)  HR:  [88-98] 88  Resp:  [16-18] 16  BP: (145-147)/(85-87) 147/87    Laboratory results:    I have personally reviewed all pertinent laboratory/tests results  Labs in last 72 hours: No results for input(s): WBC, RBC, HGB, HCT, PLT, RDW, NEUTROABS, NA, K, CL, CO2, BUN, CREATININE, GLUC, GLUF, CALCIUM, AST, ALT, ALKPHOS, TP, ALB, TBILI, BILIDIR, CHOLESTEROL, HDL, TRIG, LDLCALC, VALPROICTOT, CARBAMAZEPIN, LITHIUM, AMMONIA, [id number], FREET4, T3FREE, PREGTESTUR, PREGSERUM, HCG, HCGQUANT, RPR in the last 72 hours      Invalid input(s):  RBC  Admission Labs:   Admission on 09/10/2018   Component Date Value    Color, UA 09/10/2018 Yellow     Clarity, UA 09/10/2018 Clear     Specific Gravity, UA 09/10/2018 1 020     pH, UA 09/10/2018 7 0     Leukocytes, UA 09/10/2018 Negative     Nitrite, UA 09/10/2018 Negative     Protein, UA 09/10/2018 Negative     Glucose, UA 09/10/2018 Negative     Ketones, UA 09/10/2018 Negative     Urobilinogen, UA 09/10/2018 1 0     Bilirubin, UA 09/10/2018 Negative     Blood, UA 09/10/2018 Trace-Intact*    RBC, UA 09/10/2018 2-4*    WBC, UA 09/10/2018 1-2*    Epithelial Cells 09/10/2018 Occasional     Bacteria, UA 09/10/2018 None Seen     WBC 09/11/2018 7 33     RBC 09/11/2018 5 15     Hemoglobin 09/11/2018 16 0     Hematocrit 09/11/2018 47 5     MCV 09/11/2018 92     MCH 09/11/2018 31 1     MCHC 09/11/2018 33 7     RDW 09/11/2018 12 4     MPV 09/11/2018 9 8     Platelets 55/99/6858 219     nRBC 09/11/2018 0     Neutrophils Relative 09/11/2018 61     Immat GRANS % 09/11/2018 0     Lymphocytes Relative 09/11/2018 29     Monocytes Relative 09/11/2018 6     Eosinophils Relative 09/11/2018 3     Basophils Relative 09/11/2018 1     Neutrophils Absolute 09/11/2018 4 44     Immature Grans Absolute 09/11/2018 0 03     Lymphocytes Absolute 09/11/2018 2 13     Monocytes Absolute 09/11/2018 0 45     Eosinophils Absolute 09/11/2018 0 22     Basophils Absolute 09/11/2018 0 06     Sodium 09/11/2018 140     Potassium 09/11/2018 4 0     Chloride 09/11/2018 103     CO2 09/11/2018 29     ANION GAP 09/11/2018 8     BUN 09/11/2018 12     Creatinine 09/11/2018 0 83     Glucose 09/11/2018 99     Glucose, Fasting 09/11/2018 99     Calcium 09/11/2018 8 8     AST 09/11/2018 22     ALT 09/11/2018 25     Alkaline Phosphatase 09/11/2018 95     Total Protein 09/11/2018 7 0     Albumin 09/11/2018 3 5     Total Bilirubin 09/11/2018 0 30     eGFR 09/11/2018 121     Cholesterol 09/11/2018 118     Triglycerides 09/11/2018 49     HDL, Direct 09/11/2018 37*    LDL Calculated 09/11/2018 71     Non-HDL-Chol (CHOL-HDL) 09/11/2018 81     RPR 09/11/2018 Non-Reactive     Hemoglobin A1C 09/11/2018 5 3     EAG 09/11/2018 105     TSH 3RD GENERATON 09/11/2018 2 061        Psychiatric Review of Systems:  Behavior over the last 24 hours:  unchanged  Sleep: normal  Appetite: normal  Medication side effects: No  ROS: no complaints    Mental Status Evaluation:  Appearance:  casually dressed   Behavior:  psychomotor agitation   Speech:  loud   Mood:  angry, anxious and depressed   Affect:  labile   Language rapid   Thought Process:  circumstantial and disorganized   Thought Content:  delusions  persecutory   Perceptual Disturbances:  Auditory hallucinations without commands   Risk Potential: Suicidal Ideations without plan, Homicidal Ideations none and Potential for Aggression No   Sensorium:  person and place   Cognition:  grossly intact   Consciousness:  awake    Attention: attention span appeared shorter than expected for age   Insight:  limited   Judgment: limited   Intellect fair   Gait/Station: normal gait/station   Motor Activity: no abnormal movements     Memory: Short and long term memory  fair     Progress Toward Goals: slow progress    Recommended Treatment:   Increase olanzapine to 5 mg b i d  and 20 mg at [facility] for psychosis and mood stabilization  Continue with group therapy, milieu therapy and occupational therapy  Continue following current medications:   Current Facility-Administered Medications:  acetaminophen 650 mg Oral Q6H PRN Coral Barrier, PA-C   aluminum-magnesium hydroxide-simethicone 30 mL Oral Q4H PRN Coral Barrier, PA-C   benztropine 1 mg Intramuscular Q6H PRN Coral Barrier, PA-C   benztropine 1 mg Oral Q6H PRN Coral Barrier, PA-C   chlorproMAZINE 50 mg Intramuscular Q8H PRN [facility]   chlorproMAZINE 50 mg Oral Q8H PRN [facility]   haloperidol 10 mg Oral Q8H PRN [facility]   haloperidol lactate 5 mg Intramuscular Q6H PRN Coral Barrier, PA-C   LORazepam 2 mg Intramuscular Q6H PRN Coral Barrier, PA-C   LORazepam 2 mg Oral Q6H PRN [facility]   magnesium hydroxide 30 mL Oral Daily PRN Coral Barrier, PA-C   nicotine polacrilex 2 mg Oral Q2H PRN Coral Barrier, PA-C   OLANZapine 20 mg Oral HS [facility]   OLANZapine 5 mg Oral BID (AM & Afternoon) Alvarado Bright   OLANZapine 10 mg Intramuscular Q3H PRN Coral Barrier, PA-C   OLANZapine 10 mg Oral Q3H PRN Coral Barrier, PA-C   traZODone 50 mg Oral HS PRN Coral Barrier, PA-C       Risks, benefits and possible side effects of Medications:   Risks, benefits, and possible side effects of medications explained to patient and patient verbalizes understanding  This note has been constructed using a voice recognition system  There may be translation, syntax,  or grammatical errors  If you have any questions, please contact the dictating provider

## 2018-09-15 NOTE — PROGRESS NOTES
Pt irritable, lingering near RN station at start of shift  Talking to self in hallway  Talking about wanting to leave  Loud and rambling

## 2018-09-15 NOTE — PROGRESS NOTES
Pt   Given Thorazine  50 mg po  For agitation  At   0610  Med  Effective  Pt  Yasmin Rodriguez to  Dayroom to sit

## 2018-09-15 NOTE — PROGRESS NOTES
Pt up and at the nurse's station numerous times  Redirected back to bed numerous times  [place]be [place]  Demanding

## 2018-09-15 NOTE — PROGRESS NOTES
Pt had outburst this afternoon, began yelling about doctor not letting him leave the unit  Reminded pt he was court committed yesterday and pt said "that court team is fake " Pt slamming hands on RN station, shouting, cursing, difficult to de-escalate  Pt yelling about his country and his people  Complaining about treatment in this country  Refusing prn  Focused on leaving hospital and not needing to be here  Security called to unit  Pt at desk with security and several staff members for period of time  Pt yelled for several minutes and began to calm down  Was eventually agreeable to prn medicine  [facility]

## 2018-09-15 NOTE — PROGRESS NOTES
Pt is labile, agitated throughout shift  Pt keeps shouting about leaving, wants to call the police  Pt mumbles and is difficult to understand at times, in both [facility] [place] and [place]  Pt is redirectable  Responding to internal stimuli

## 2018-09-15 NOTE — PROGRESS NOTES
Pt approached RN desk screaming, pounded hand on desk several times, yelling for dining room to be opened  Pt was informed several times what time breakfast would be here  Pt yelling about leaving and making verbal threats   "I got my people out there "

## 2018-09-16 PROCEDURE — 99232 SBSQ HOSP IP/OBS MODERATE 35: CPT | Performed by: PSYCHIATRY & NEUROLOGY

## 2018-09-16 RX ADMIN — OLANZAPINE 5 MG: 5 TABLET, ORALLY DISINTEGRATING ORAL at 13:21

## 2018-09-16 RX ADMIN — OLANZAPINE 20 MG: 10 TABLET, ORALLY DISINTEGRATING ORAL at 21:42

## 2018-09-16 RX ADMIN — NICOTINE POLACRILEX 2 MG: 2 GUM, CHEWING BUCCAL at 12:43

## 2018-09-16 RX ADMIN — OLANZAPINE 5 MG: 5 TABLET, ORALLY DISINTEGRATING ORAL at 08:51

## 2018-09-16 RX ADMIN — CHLORPROMAZINE HYDROCHLORIDE 100 MG: 100 TABLET, SUGAR COATED ORAL at 18:38

## 2018-09-16 RX ADMIN — CHLORPROMAZINE HYDROCHLORIDE 100 MG: 100 TABLET, SUGAR COATED ORAL at 10:03

## 2018-09-16 NOTE — PROGRESS NOTES
PT has been targeting the writer during the day accusing of trying to control him  PT is in constant bellicose arguments, and it not clear or order of ideas in his speech  He most speech Polish  PT bang his hand on the counter several times during the day at the nursing station saying " I am the man and not woman is going to tell me what to do  He always yells imposing his manliness to the writer  The writer fount out that to not answering to the pt is more effective for him to do not escalate  MHT gave the radio to PT with some bachata music and he smile and thanks to Writer, but it was ask to decrease the volume of the radio, and PT said " this is why I do not ask for nothing to no one give me orders   I am the MAN "

## 2018-09-16 NOTE — PROGRESS NOTES
[facility] Note [facility]  [address] [facility] 34 y o  male MRN: [id number]  Unit/Bed#: [facility] 253-01 Encounter: [id number]    Assessment/Plan   Principal Problem:    Paranoid schizophrenia ([address] )      Subjective:  Patient is compliant with medications with no acute side effects  Patient is less agitated and not getting angry are louder easily compared to my evaluation yesterday  Patient did remained focused on discharge and got irritable in the morning needing Thorazine p r n , and responded well to this p r n  Ragini Carlson Patient is showing improvement in psychotic symptoms and most of his aggression is related to discharge planning  His presentation now is more suggestive of behavioral in nature rather than underlying psychosis  I will continue current dosage of antipsychotics and assessing daily improvement at this time      Current Medications:    Current Facility-Administered Medications:  acetaminophen 650 mg Oral Q6H PRN Michelle Huerta PA-C   aluminum-magnesium hydroxide-simethicone 30 mL Oral Q4H PRN Michelle Huerta PA-C   benztropine 1 mg Intramuscular Q6H PRN Michelle Huerta PA-C   benztropine 1 mg Oral Q6H PRN Michelle Huerta PA-C   chlorproMAZINE 50 mg Intramuscular Q8H PRN Alvarado Bright   chlorproMAZINE 100 mg Oral Q8H PRN Alvarado Bright   haloperidol 10 mg Oral Q8H PRN Alvarado Bright   haloperidol lactate 5 mg Intramuscular Q6H PRN MOISES Kumar-FRANKIE   LORazepam 2 mg Intramuscular Q6H PRN MOISES Kumar-FRANKIE   LORazepam 2 mg Oral Q6H PRN Alvarado Bright   magnesium hydroxide 30 mL Oral Daily PRN Michelle Huerta PA-C   nicotine polacrilex 2 mg Oral Q2H PRN Michelle Huerta PA-C   OLANZapine 20 mg Oral HS Alvarado Bright   OLANZapine 5 mg Oral BID (AM & Afternoon) Alvarado Bright   OLANZapine 10 mg Intramuscular Q3H PRN MOISES Kumar-FRANKIE   OLANZapine 10 mg Oral Q3H PRN MOISES Kumar-FRANKIE   traZODone 50 mg Oral HS PRN Michelle Huerta PA-C       Behavioral Health Medications: all current active meds have been reviewed  Vital signs in last 24 hours:  Temp:  [96 9 °F (36 1 °C)-97 1 °F (36 2 °C)] 97 1 °F (36 2 °C)  HR:  [] 95  Resp:  [18-20] 20  BP: (132-135)/(85-86) 135/86    Laboratory results:    I have personally reviewed all pertinent laboratory/tests results  Labs in last 72 hours: No results for input(s): WBC, RBC, HGB, HCT, PLT, RDW, NEUTROABS, NA, K, CL, CO2, BUN, CREATININE, GLUC, GLUF, CALCIUM, AST, ALT, ALKPHOS, TP, ALB, TBILI, BILIDIR, CHOLESTEROL, HDL, TRIG, LDLCALC, VALPROICTOT, CARBAMAZEPIN, LITHIUM, AMMONIA, [id number], FREET4, T3FREE, PREGTESTUR, PREGSERUM, HCG, HCGQUANT, RPR in the last 72 hours      Invalid input(s):  RBC  Admission Labs:   Admission on 09/10/2018   Component Date Value    Color, UA 09/10/2018 Yellow     Clarity, UA 09/10/2018 Clear     Specific Gravity, UA 09/10/2018 1 020     pH, UA 09/10/2018 7 0     Leukocytes, UA 09/10/2018 Negative     Nitrite, UA 09/10/2018 Negative     Protein, UA 09/10/2018 Negative     Glucose, UA 09/10/2018 Negative     Ketones, UA 09/10/2018 Negative     Urobilinogen, UA 09/10/2018 1 0     Bilirubin, UA 09/10/2018 Negative     Blood, UA 09/10/2018 Trace-Intact*    RBC, UA 09/10/2018 2-4*    WBC, UA 09/10/2018 1-2*    Epithelial Cells 09/10/2018 Occasional     Bacteria, UA 09/10/2018 None Seen     WBC 09/11/2018 7 33     RBC 09/11/2018 5 15     Hemoglobin 09/11/2018 16 0     Hematocrit 09/11/2018 47 5     MCV 09/11/2018 92     MCH 09/11/2018 31 1     MCHC 09/11/2018 33 7     RDW 09/11/2018 12 4     MPV 09/11/2018 9 8     Platelets 93/50/3608 219     nRBC 09/11/2018 0     Neutrophils Relative 09/11/2018 61     Immat GRANS % 09/11/2018 0     Lymphocytes Relative 09/11/2018 29     Monocytes Relative 09/11/2018 6     Eosinophils Relative 09/11/2018 3     Basophils Relative 09/11/2018 1     Neutrophils Absolute 09/11/2018 4 44     Immature Grans Absolute 09/11/2018 0 03  Lymphocytes Absolute 09/11/2018 2 13     Monocytes Absolute 09/11/2018 0 45     Eosinophils Absolute 09/11/2018 0 22     Basophils Absolute 09/11/2018 0 06     Sodium 09/11/2018 140     Potassium 09/11/2018 4 0     Chloride 09/11/2018 103     CO2 09/11/2018 29     ANION GAP 09/11/2018 8     BUN 09/11/2018 12     Creatinine 09/11/2018 0 83     Glucose 09/11/2018 99     Glucose, Fasting 09/11/2018 99     Calcium 09/11/2018 8 8     AST 09/11/2018 22     ALT 09/11/2018 25     Alkaline Phosphatase 09/11/2018 95     Total Protein 09/11/2018 7 0     Albumin 09/11/2018 3 5     Total Bilirubin 09/11/2018 0 30     eGFR 09/11/2018 121     Cholesterol 09/11/2018 118     Triglycerides 09/11/2018 49     HDL, Direct 09/11/2018 37*    LDL Calculated 09/11/2018 71     Non-HDL-Chol (CHOL-HDL) 09/11/2018 81     RPR 09/11/2018 Non-Reactive     Hemoglobin A1C 09/11/2018 5 3     EAG 09/11/2018 105     TSH 3RD GENERATON 09/11/2018 2 061        Psychiatric Review of Systems:  Behavior over the last 24 hours:  Slow improvement  Sleep: normal  Appetite: normal  Medication side effects: No  ROS: no complaints    Mental Status Evaluation:  Appearance:  casually dressed   Behavior:  guarded   Speech:  loud   Mood:  angry and anxious   Affect:  increased in range   Language rapid   Thought Process:  circumstantial   Thought Content:  delusions  persecutory   Perceptual Disturbances:  Auditory hallucinations without commands   Risk Potential: Suicidal Ideations without plan, Homicidal Ideations none and Potential for Aggression No   Sensorium:  person and place   Cognition:  grossly intact   Consciousness:  awake    Attention: attention span appeared shorter than expected for age   Insight:  limited   Judgment: limited   Intellect fair   Gait/Station: normal gait/station   Motor Activity: no abnormal movements     Memory: Short and long term memory  fair     Progress Toward Goals: slow progress    Recommended Treatment:   Continue with group therapy, milieu therapy and occupational therapy  Continue following current medications:   Current Facility-Administered Medications:  acetaminophen 650 mg Oral Q6H PRN Bella Prior, PA-C   aluminum-magnesium hydroxide-simethicone 30 mL Oral Q4H PRN Bella Prior, PA-C   benztropine 1 mg Intramuscular Q6H PRN Bella Prior, PA-C   benztropine 1 mg Oral Q6H PRN Bella Prior, PA-C   chlorproMAZINE 50 mg Intramuscular Q8H PRN [facility]   chlorproMAZINE 100 mg Oral Q8H PRN [facility]   haloperidol 10 mg Oral Q8H PRN [facility]   haloperidol lactate 5 mg Intramuscular Q6H PRN Bella Prior, PA-C   LORazepam 2 mg Intramuscular Q6H PRN Bella Prior, PA-C   LORazepam 2 mg Oral Q6H PRN [facility]   magnesium hydroxide 30 mL Oral Daily PRN Bella Prior, PA-C   nicotine polacrilex 2 mg Oral Q2H PRN Bella Prior, PA-C   OLANZapine 20 mg Oral HS [facility]   OLANZapine 5 mg Oral BID (AM & Afternoon) [facility]   OLANZapine 10 mg Intramuscular Q3H PRN Bella Prior, PA-C   OLANZapine 10 mg Oral Q3H PRN Bella Prior, PA-C   traZODone 50 mg Oral HS PRN Bella Prior, PA-C       Risks, benefits and possible side effects of Medications:   Risks, benefits, and possible side effects of medications explained to patient and patient verbalizes understanding  This note has been constructed using a voice recognition system  There may be translation, syntax,  or grammatical errors  If you have any questions, please contact the dictating provider

## 2018-09-16 NOTE — PROGRESS NOTES
Pt has periods of improved behavior, calmer and able to speak with staff more appropriately  Pt does continue to have periods of anger/irritability d/t wanting to leave hospital  Given thorazine prn at 1003 when mht reported pt was making verbal threats and becoming more agitated  Pt remained in control when roommate became confrontational with him and handled well  Pt walks halls and lingers at RN station at times

## 2018-09-16 NOTE — PROGRESS NOTES
Pt is observed in the halls, labile, agitated at the beginning of shift  Denies SI, HI, AVH  Pt does yell, talk to self, responds to internal stimuli  Pt wants to call police to get him out of the hospital  Pt made a verbal threat to smack this writer, then walked away  Observed at window in hallway yelling profanities, punched window  Able to be redirected  Pt showered and washed clothes  States the shower is "best thing to do when stressed out " Reports wanted to go back to [facility] when d/c'd  Pt observed hitting the fire alarm cover near med room  Listening to [employer] music on radio at nurses station to help calm and distract  Effective for short while

## 2018-09-16 NOTE — PROGRESS NOTES
Pt slept for the majority of the night, waking for brief periods for crackers however able to fall back asleep  Pt calm and cooperative  No irritability noted

## 2018-09-17 PROCEDURE — 99232 SBSQ HOSP IP/OBS MODERATE 35: CPT | Performed by: PSYCHIATRY & NEUROLOGY

## 2018-09-17 RX ORDER — LITHIUM 8 MEQ/5ML
16 SOLUTION ORAL
Status: DISCONTINUED | OUTPATIENT
Start: 2018-09-17 | End: 2018-09-27 | Stop reason: HOSPADM

## 2018-09-17 RX ORDER — BENZTROPINE MESYLATE 1 MG/1
1 TABLET ORAL 2 TIMES DAILY
Status: DISCONTINUED | OUTPATIENT
Start: 2018-09-17 | End: 2018-09-27 | Stop reason: HOSPADM

## 2018-09-17 RX ADMIN — LORAZEPAM 2 MG: 1 TABLET ORAL at 09:37

## 2018-09-17 RX ADMIN — BENZTROPINE MESYLATE 1 MG: 1 TABLET ORAL at 17:01

## 2018-09-17 RX ADMIN — OLANZAPINE 5 MG: 5 TABLET, ORALLY DISINTEGRATING ORAL at 13:03

## 2018-09-17 RX ADMIN — OLANZAPINE 20 MG: 10 TABLET, ORALLY DISINTEGRATING ORAL at 21:34

## 2018-09-17 RX ADMIN — CHLORPROMAZINE HYDROCHLORIDE 100 MG: 100 TABLET, SUGAR COATED ORAL at 05:16

## 2018-09-17 RX ADMIN — LITHIUM 16 MEQ: 8 SOLUTION ORAL at 18:16

## 2018-09-17 RX ADMIN — OLANZAPINE 5 MG: 5 TABLET, ORALLY DISINTEGRATING ORAL at 09:36

## 2018-09-17 NOTE — PROGRESS NOTES
Pt remains agitated for the first half of this shift, pacing, irritable, looking out the window and stating he's going to be out there where no one will tell him what to do  Pt initially refused lithium, stated "I don't believe that mouna, that [address], I believe in God " Attempted to reassure pt but pt continued to refuse, also declined PRN Ativan and stated he feels fine  Pt later agreed to take scheduled Lithium  Listened to music in his room for much of the second half of the shift, was calmer and friendly at HS though he remained disorganized in conversation

## 2018-09-17 NOTE — PROGRESS NOTES
Pt slept for about one hour since last note  When patient woke up, he was agitated, yelling in hallway  Approached desk with rapid speech, cursing and speaking in [address]  Difficult to redirect  Pt pacing hallways yelling and waking several peers up  With a lot of encouragement, pt agreeable to go to dayroom with staff  Pt continued yelling, punching fist in hand and smacking ottoman  Pt initially refusing PRN medications however after some time of verbal deescalation, was agreeable to take thorazine PO  Pt spitting on floor and talking about the illuminati  RIS and yelling about his sister  States "You want me to get a shot in the arm  F*ck that! I dont need that shit! You understand?!" pt rambling and smacking ottoman several times however was able to be verbally deescalated after about 20 minutes  Pt remains labile and agitated however more redirectable  States he wants to speak to the Doctor "Not the little kid, I want the Doctor " Explained pt would need to wait because Doctor was not in yet in which pt states "He better let me go today or else " Explained appropriate behaviors expected on unit and when speaking with Doctor however pt began yelling in Mongolian/English and difficult to understand  Refused lanuage line  Believes he knows MHT on the unit and speaking to her frequently in Mongolian  Pt demanding at times and wanting coffee however redirectable at this time

## 2018-09-17 NOTE — PLAN OF CARE
Problem: Ineffective Coping  Goal: Participates in unit activities  Interventions:  - Provide therapeutic environment   - Provide required programming   - Redirect inappropriate behaviors    Outcome: Not Progressing  Pt does not appear appropriate for engagement in group activities at this time due to level of agitation and disorganization

## 2018-09-17 NOTE — PROGRESS NOTES
Pt   Given  Thorazine  100 mg po  For severe  Agitation  At   0516  Med slightly  Effective  Pt  In Quiet Room  Listening  To music  At moment

## 2018-09-17 NOTE — PROGRESS NOTES
Pt visibly upset in hallway  Pt stated that he was not going to sleep at all tonight, until he talks to the doctor tomorrow  Stated, "if the doctor does not let me sign the thing tomorrow then I'm going to fucking kill him " Pt  into fist  Pt and this writer walked down mccartney discussed not making threats  Encouraged pt to try laying in bed and relaxing  Pt denied, sat in chair by hallway phone  Did calm down and was redirectable  Currently in room

## 2018-09-17 NOTE — PROGRESS NOTES
Pt approached desk in the beginning of the shift agitated stating he will not sleep in his room "I don't sleep next to another kelli " Pt states he wants a room change  When explained that a room change could not happen at this time of night, pt began mumbling incoherent words and kicked the wall  Was offered the quiet room due to level of agitation  Pt initially was reluctant to go into quiet room "You wont lock those doors will you? I don't want to be locked in anywhere " Explained he would not be locked in and could use this room overnight to sleep  Pt given crackers and water per request and returned to quiet room  Able to sleep for approximately 3 hours  When pt woke, he believed it was day time  Explained it was not day time yet  Requested more crackers and water which was provided  No irritability noted at this time  Returned to quiet room and able to sleep soon after  Will monitor

## 2018-09-17 NOTE — PROGRESS NOTES
Patient aggitated and yelling in dayroom about having to remain inpatient  Uncooperative and unable to verbally deescalate  Patient lunged at staff and bit plastic med cup in half when taking prn Thorazine  Control team called  Patient deescalated in quiet room where he eventually fell asleep  Will continue to monitor

## 2018-09-17 NOTE — CASE MANAGEMENT
SW met with pt who persists with poor insight, poor comprehension, disorganization regarding reasons for admission and status of I/P hospitalization  Pt continues to report the court hearing as being fake and the parties involved in the hearing as being fake  Pt is still angry towards sister and request to be discharged today

## 2018-09-17 NOTE — PROGRESS NOTES
Progress Note - [address] [facility] 34 y o  male MRN: [id number]  Unit/Bed#: U 252-01 Encounter: [id number]    Assessment/Plan   Principal Problem:    Paranoid schizophrenia ([address] )      Subjective:  Patient overall poor historian  Today was very agitated in the morning unable to complete proper interview  Was yelling in dayroom where all other patients were required to exit  Was yelling and unable to be deescalated  Required control team + Thorazine 100mg PO + Ativan 2mg PO  Bit plastic medication cup when given medications  Has been placed on mouth checks due to deterioration and paranoia  Does verbally posture at staff including MD   This morning was rambling and disorganized  Labile and potential for aggression  Does speak to himself at times showing internal preoccupation  Does have history of auditory hallucinations and would not answer if he had them today  Would not elaborate on mood related symptoms  Denied SI  Nonspecific HI towards anyone  Limited insight and judgement  Is willing to take medications PO over IM  Was then sleeping in quiet room majority of morning        Current Medications:  Current Facility-Administered Medications   Medication Dose Route Frequency    acetaminophen (TYLENOL) tablet 650 mg  650 mg Oral Q6H PRN    aluminum-magnesium hydroxide-simethicone (MYLANTA) 200-200-20 mg/5 mL oral suspension 30 mL  30 mL Oral Q4H PRN    benztropine (COGENTIN) injection 1 mg  1 mg Intramuscular Q6H PRN    benztropine (COGENTIN) tablet 1 mg  1 mg Oral Q6H PRN    benztropine (COGENTIN) tablet 1 mg  1 mg Oral BID    chlorproMAZINE (THORAZINE) injection SOLN 50 mg  50 mg Intramuscular Q8H PRN    chlorproMAZINE (THORAZINE) tablet 100 mg  100 mg Oral Q8H PRN    haloperidol (HALDOL) tablet 10 mg  10 mg Oral Q8H PRN    haloperidol lactate (HALDOL) injection 5 mg  5 mg Intramuscular Q6H PRN    LORazepam (ATIVAN) 2 mg/mL injection 2 mg  2 mg Intramuscular Q6H PRN    LORazepam (ATIVAN) tablet 2 mg  2 mg Oral Q6H PRN    magnesium hydroxide (MILK OF MAGNESIA) 400 mg/5 mL oral suspension 30 mL  30 mL Oral Daily PRN    nicotine polacrilex (NICORETTE) gum 2 mg  2 mg Oral Q2H PRN    OLANZapine (ZyPREXA ZYDIS) dispersible tablet 20 mg  20 mg Oral HS    OLANZapine (ZyPREXA ZYDIS) dispersible tablet 5 mg  5 mg Oral BID (AM & Afternoon)    OLANZapine (ZyPREXA) IM injection 10 mg  10 mg Intramuscular Q3H PRN    OLANZapine (ZyPREXA) tablet 10 mg  10 mg Oral Q3H PRN    traZODone (DESYREL) tablet 50 mg  50 mg Oral HS PRN       Behavioral Health Medications: all current active meds have been reviewed and continue current psychiatric medications  Vitals:  Vitals:    09/17/18 0658   BP: 140/79   Pulse: 97   Resp: 18   Temp: 97 9 °F (36 6 °C)   SpO2:        Laboratory results:    I have personally reviewed all pertinent laboratory/tests results    Most Recent Labs:   Lab Results   Component Value Date    WBC 7 33 09/11/2018    RBC 5 15 09/11/2018    HGB 16 0 09/11/2018    HCT 47 5 09/11/2018     09/11/2018    RDW 12 4 09/11/2018    NEUTROABS 4 44 09/11/2018     09/11/2018    K 4 0 09/11/2018     09/11/2018    CO2 29 09/11/2018    BUN 12 09/11/2018    CREATININE 0 83 09/11/2018    GLUC 99 09/11/2018    GLUF 99 09/11/2018    CALCIUM 8 8 09/11/2018    AST 22 09/11/2018    ALT 25 09/11/2018    ALKPHOS 95 09/11/2018    TP 7 0 09/11/2018    ALB 3 5 09/11/2018    TBILI 0 30 09/11/2018    CHOLESTEROL 118 09/11/2018    HDL 37 (L) 09/11/2018    TRIG 49 09/11/2018    LDLCALC 71 09/11/2018    NONHDLC 81 09/11/2018    [id number] 2 061 09/11/2018    RPR Non-Reactive 09/11/2018       Psychiatric Review of Systems:  Behavior over the last 24 hours:  unchanged  Sleep: poor  Appetite: normal  Medication side effects: Yes, appears restlessness  ROS: no complaints    Mental Status Evaluation:  Appearance:  disheveled   Behavior:  psychomotor agitation, uncooperative   Speech:  loud and tangential   Mood:  angry and dysthymic   Affect:  increased in intensity, increased in range and labile   Language naming objects and repeating phrases   Thought Process:  circumstantial and disorganized   Thought Content:  paranoid delusions   Perceptual Disturbances: Auditory hallucinations without commands  Internal preoccupation   Risk Potential: Denied SI  Generalized HI  Potential for aggression: yes   Sensorium:  person and place   Cognition:  grossly intact   Consciousness:  awake    Recent and Remote Memory poor   Attention: attention span appeared shorter than expected for age   Insight:  limited   Judgment: limited   Gait/Station: normal gait/station and normal balance   Motor Activity: no abnormal movements     Progress Toward Goals: unchanged    Recommended Treatment: Continue with group therapy, milieu therapy and occupational therapy  1   Add Cogentin 1mg BID for possible akathisia due to high dose of Zyprexa + PRN antipsychotics   2  Continue other medications  3  Patient currently on involuntary 303    Risks, benefits and possible side effects of Medications:   Patient does not verbalize understanding at this time and will require further explanation        Michelle Huerta PA-C

## 2018-09-18 PROCEDURE — 99232 SBSQ HOSP IP/OBS MODERATE 35: CPT | Performed by: PSYCHIATRY & NEUROLOGY

## 2018-09-18 RX ADMIN — LORAZEPAM 2 MG: 1 TABLET ORAL at 19:20

## 2018-09-18 RX ADMIN — BENZTROPINE MESYLATE 1 MG: 1 TABLET ORAL at 18:12

## 2018-09-18 RX ADMIN — OLANZAPINE 5 MG: 5 TABLET, ORALLY DISINTEGRATING ORAL at 13:07

## 2018-09-18 RX ADMIN — CHLORPROMAZINE HYDROCHLORIDE 100 MG: 100 TABLET, SUGAR COATED ORAL at 19:18

## 2018-09-18 RX ADMIN — BENZTROPINE MESYLATE 1 MG: 1 TABLET ORAL at 08:53

## 2018-09-18 RX ADMIN — LITHIUM 16 MEQ: 8 SOLUTION ORAL at 18:13

## 2018-09-18 RX ADMIN — OLANZAPINE 20 MG: 10 TABLET, ORALLY DISINTEGRATING ORAL at 23:04

## 2018-09-18 RX ADMIN — OLANZAPINE 5 MG: 5 TABLET, ORALLY DISINTEGRATING ORAL at 08:53

## 2018-09-18 NOTE — PROGRESS NOTES
Pt visible on module, observed speaking to self while looking out window and in bathroom  Compliant, responds to redirections  Appears confused at times  Became agitated, talking loudly and making hand gestures  Unable to respond to staff redirections, appeared frustrated with staff, some unintelligible comments, upset by being hospitalized  When offered prn, pt yelled and slammed floor with fist, but then did accept medication  Prn thorazine and ativan administered  Pt monitored by staff, continued looking out window was able to calm down  Pt slept

## 2018-09-18 NOTE — PROGRESS NOTES
Progress Note - [facility]  [address] [facility] 34 y o  male MRN: [id number]  Unit/Bed#: [facility] 252-01 Encounter: [id number]    Assessment/Plan   Principal Problem:    Paranoid schizophrenia ([address] )      Subjective:  Patient today less labile and was cooperative with interview  Able to sit down in quiet room and express how he feels  Still remains with grandiose and paranoid delusions  Believes court system here is fake and that he should be discharged  Believes there is nothing wrong with him psychiatrically  Agreed today that he will take his medications and not act out  Remains on mouth checks  Appears better groomed today, was doing laundry, and listens to music to calm himself  Denied he was depressed and denied SI  Is angry that his family placed him in hospital   Does report anxiety with racing thoughts over when he will be discharged  Denied A/V/O/T hallucinations today  Appears less internally preoccupied  Not threatening with me during interview  Restlessness appears less today and he looks less angry  Does have mild bilateral hand tremors  Besides decreased restlessness and hand tremors denied additional somatic complaints  No PRN medications required so far today  Shows some improvement      Current Medications:  Current Facility-Administered Medications   Medication Dose Route Frequency    acetaminophen (TYLENOL) tablet 650 mg  650 mg Oral Q6H PRN    aluminum-magnesium hydroxide-simethicone (MYLANTA) 200-200-20 mg/5 mL oral suspension 30 mL  30 mL Oral Q4H PRN    benztropine (COGENTIN) injection 1 mg  1 mg Intramuscular Q6H PRN    benztropine (COGENTIN) tablet 1 mg  1 mg Oral Q6H PRN    benztropine (COGENTIN) tablet 1 mg  1 mg Oral BID    chlorproMAZINE (THORAZINE) injection SOLN 50 mg  50 mg Intramuscular Q8H PRN    chlorproMAZINE (THORAZINE) tablet 100 mg  100 mg Oral Q8H PRN    haloperidol (HALDOL) tablet 10 mg  10 mg Oral Q8H PRN    haloperidol lactate (HALDOL) injection 5 mg  5 mg Intramuscular Q6H PRN    lithium citrate oral solution 16 mEq  16 mEq Oral After Dinner    LORazepam (ATIVAN) 2 mg/mL injection 2 mg  2 mg Intramuscular Q6H PRN    LORazepam (ATIVAN) tablet 2 mg  2 mg Oral Q6H PRN    magnesium hydroxide (MILK OF MAGNESIA) 400 mg/5 mL oral suspension 30 mL  30 mL Oral Daily PRN    nicotine polacrilex (NICORETTE) gum 2 mg  2 mg Oral Q2H PRN    OLANZapine (ZyPREXA ZYDIS) dispersible tablet 20 mg  20 mg Oral HS    OLANZapine (ZyPREXA ZYDIS) dispersible tablet 5 mg  5 mg Oral BID (AM & Afternoon)    OLANZapine (ZyPREXA) IM injection 10 mg  10 mg Intramuscular Q3H PRN    OLANZapine (ZyPREXA) tablet 10 mg  10 mg Oral Q3H PRN    traZODone (DESYREL) tablet 50 mg  50 mg Oral HS PRN       Behavioral Health Medications: all current active meds have been reviewed and continue current psychiatric medications  Vitals:  Vitals:    09/18/18 0738   BP: 135/91   Pulse: 87   Resp: 20   Temp: (!) 96 2 °F (35 7 °C)   SpO2:        Laboratory results:    I have personally reviewed all pertinent laboratory/tests results    Most Recent Labs:   Lab Results   Component Value Date    WBC 7 33 09/11/2018    RBC 5 15 09/11/2018    HGB 16 0 09/11/2018    HCT 47 5 09/11/2018     09/11/2018    RDW 12 4 09/11/2018    NEUTROABS 4 44 09/11/2018     09/11/2018    K 4 0 09/11/2018     09/11/2018    CO2 29 09/11/2018    BUN 12 09/11/2018    CREATININE 0 83 09/11/2018    GLUC 99 09/11/2018    GLUF 99 09/11/2018    CALCIUM 8 8 09/11/2018    AST 22 09/11/2018    ALT 25 09/11/2018    ALKPHOS 95 09/11/2018    TP 7 0 09/11/2018    ALB 3 5 09/11/2018    TBILI 0 30 09/11/2018    CHOLESTEROL 118 09/11/2018    HDL 37 (L) 09/11/2018    TRIG 49 09/11/2018    LDLCALC 71 09/11/2018    NONHDLC 81 09/11/2018    [id number] 2 061 09/11/2018    RPR Non-Reactive 09/11/2018       Psychiatric Review of Systems:  Behavior over the last 24 hours:  improved  Sleep: improved  Appetite: normal  Medication side effects: No  ROS: no complaints    Mental Status Evaluation:  Appearance:  casually dressed   Behavior:  more cooperative   Speech:  loud   Mood:  anxious   Affect:  increased in range, less labile   Language naming objects and repeating phrases   Thought Process:  circumstantial and disorganized   Thought Content:  paranoid and grandiose delusions   Perceptual Disturbances: None  Internal preoccupation appears less   Risk Potential: Denied SI/HI  Potential for aggression: yes   Sensorium:  person and place   Cognition:  grossly intact   Consciousness:  alert and awake    Recent and Remote Memory intact   Attention: attention span appeared shorter than expected for age   Insight:  limited   Judgment: limited   Gait/Station: normal gait/station and normal balance   Motor Activity: no abnormal movements     Progress Toward Goals: some progression    Recommended Treatment: Continue with group therapy, milieu therapy and occupational therapy  1   Continue current medications  2  Lithium level and CMP on 9/20/18  3  Disposition planning - on 303    Risks, benefits and possible side effects of Medications:   Risks, benefits, and possible side effects of medications explained to patient and patient verbalizes understanding        Coral Barrier MIKE

## 2018-09-18 NOTE — PROGRESS NOTES
Patient states he slept well  He is less labile this morning  Easier to redirect  Showered and did laundry  Utilizes music as effective coping mechanism  Did not attend groups

## 2018-09-19 PROCEDURE — 99232 SBSQ HOSP IP/OBS MODERATE 35: CPT | Performed by: PSYCHIATRY & NEUROLOGY

## 2018-09-19 RX ADMIN — LITHIUM 16 MEQ: 8 SOLUTION ORAL at 17:20

## 2018-09-19 RX ADMIN — BENZTROPINE MESYLATE 1 MG: 1 TABLET ORAL at 17:20

## 2018-09-19 RX ADMIN — LORAZEPAM 2 MG: 1 TABLET ORAL at 12:46

## 2018-09-19 RX ADMIN — OLANZAPINE 5 MG: 5 TABLET, ORALLY DISINTEGRATING ORAL at 13:31

## 2018-09-19 RX ADMIN — OLANZAPINE 5 MG: 5 TABLET, ORALLY DISINTEGRATING ORAL at 09:25

## 2018-09-19 RX ADMIN — OLANZAPINE 20 MG: 10 TABLET, ORALLY DISINTEGRATING ORAL at 21:14

## 2018-09-19 RX ADMIN — BENZTROPINE MESYLATE 1 MG: 1 TABLET ORAL at 09:25

## 2018-09-19 RX ADMIN — NICOTINE POLACRILEX 2 MG: 2 GUM, CHEWING BUCCAL at 13:33

## 2018-09-19 NOTE — PROGRESS NOTES
Progress Note - [address] [facility]  [address]  [address] [facility] 34 y o  male MRN: [id number]  Unit/Bed#: [facility] 252-01 Encounter: [id number]    Assessment/Plan   Principal Problem:    Paranoid schizophrenia ([address] )      Subjective:  Patient disorganized and illogical for most part of conversation  Was focused on how he signed a 72 hour notice, wanting to see his child, claims he does not have a mental illness, and that [place] courts are fake (only [place] is real)  Reported someone or something bad was in cafeteria room and did not elaborate  Responded better when asked to move away from the exit and did report while walking threats that "you don't know who I know "  Is seen clenching fists at times and was punching ground yesterday  Still is getting PRNs for agitation, but at less frequency  Did not require control team yesterday  Tono Berry he will take the medications  Made aware tomorrow morning he will require Lithium level  More irritable and agitated today  Is able to speak in 1:1 conversation  Did deny A/V/O/T hallucinations and SI/HI  Was seen making bizarre hand gestures and speaking to self while in hallways  Is on mouth checks  Denied potential serious side effects  Is less restless  Possibly has constipation, but was unclear in communication        Current Medications:  Current Facility-Administered Medications   Medication Dose Route Frequency    acetaminophen (TYLENOL) tablet 650 mg  650 mg Oral Q6H PRN    aluminum-magnesium hydroxide-simethicone (MYLANTA) 200-200-20 mg/5 mL oral suspension 30 mL  30 mL Oral Q4H PRN    benztropine (COGENTIN) injection 1 mg  1 mg Intramuscular Q6H PRN    benztropine (COGENTIN) tablet 1 mg  1 mg Oral Q6H PRN    benztropine (COGENTIN) tablet 1 mg  1 mg Oral BID    chlorproMAZINE (THORAZINE) injection SOLN 50 mg  50 mg Intramuscular Q8H PRN    chlorproMAZINE (THORAZINE) tablet 100 mg  100 mg Oral Q8H PRN    haloperidol (HALDOL) tablet 10 mg  10 mg Oral Q8H PRN    haloperidol lactate (HALDOL) injection 5 mg  5 mg Intramuscular Q6H PRN    lithium citrate oral solution 16 mEq  16 mEq Oral After Dinner    LORazepam (ATIVAN) 2 mg/mL injection 2 mg  2 mg Intramuscular Q6H PRN    LORazepam (ATIVAN) tablet 2 mg  2 mg Oral Q6H PRN    magnesium hydroxide (MILK OF MAGNESIA) 400 mg/5 mL oral suspension 30 mL  30 mL Oral Daily PRN    nicotine polacrilex (NICORETTE) gum 2 mg  2 mg Oral Q2H PRN    OLANZapine (ZyPREXA ZYDIS) dispersible tablet 20 mg  20 mg Oral HS    OLANZapine (ZyPREXA ZYDIS) dispersible tablet 5 mg  5 mg Oral BID (AM & Afternoon)    OLANZapine (ZyPREXA) IM injection 10 mg  10 mg Intramuscular Q3H PRN    OLANZapine (ZyPREXA) tablet 10 mg  10 mg Oral Q3H PRN    traZODone (DESYREL) tablet 50 mg  50 mg Oral HS PRN       Behavioral Health Medications: all current active meds have been reviewed and continue current psychiatric medications  Vitals:  Vitals:    09/19/18 0658   BP: 140/87   Pulse: 99   Resp: 18   Temp: 97 5 °F (36 4 °C)   SpO2:        Laboratory results:    I have personally reviewed all pertinent laboratory/tests results    Most Recent Labs:   Lab Results   Component Value Date    WBC 7 33 09/11/2018    RBC 5 15 09/11/2018    HGB 16 0 09/11/2018    HCT 47 5 09/11/2018     09/11/2018    RDW 12 4 09/11/2018    NEUTROABS 4 44 09/11/2018     09/11/2018    K 4 0 09/11/2018     09/11/2018    CO2 29 09/11/2018    BUN 12 09/11/2018    CREATININE 0 83 09/11/2018    GLUC 99 09/11/2018    GLUF 99 09/11/2018    CALCIUM 8 8 09/11/2018    AST 22 09/11/2018    ALT 25 09/11/2018    ALKPHOS 95 09/11/2018    TP 7 0 09/11/2018    ALB 3 5 09/11/2018    TBILI 0 30 09/11/2018    CHOLESTEROL 118 09/11/2018    HDL 37 (L) 09/11/2018    TRIG 49 09/11/2018    LDLCALC 71 09/11/2018    NONHDLC 81 09/11/2018    [id number] 2 061 09/11/2018    RPR Non-Reactive 09/11/2018       Psychiatric Review of Systems:  Behavior over the last 24 hours:  unchanged  Sleep: varies  Appetite: poor  Medication side effects: No  ROS: no complaints    Mental Status Evaluation:  Appearance:  casually dressed   Behavior:  psychomotor agitation   Speech:  loud   Mood:  anxious   Affect:  increased in range, labile and angry   Language naming objects and repeating phrases   Thought Process:  disorganized, illogical   Thought Content:  paranoid and grandiose delusions   Perceptual Disturbances: None  Possible internal preoccupation   Risk Potential: Denied SI/HI  Potential for aggression: yes   Sensorium:  person and place   Cognition:  grossly intact   Consciousness:  awake    Recent and Remote Memory poor   Attention: attention span appeared shorter than expected for age   Insight:  limited   Judgment: limited   Gait/Station: normal gait/station and normal balance   Motor Activity: no abnormal movements     Progress Toward Goals: unchanged    Recommended Treatment: Continue with group therapy, milieu therapy and occupational therapy  1   Continue current medications  2  Lithium level and CMP tomorrow  3  Remains on involuntary 303    Risks, benefits and possible side effects of Medications:   Patient does not verbalize understanding at this time and will require further explanation        Zac Amaya PA-C

## 2018-09-19 NOTE — PROGRESS NOTES
Prn ativan provided at 12:46 for pt requesting 'something good and mellow' and pt presented with agitation and focus on wanting to leave  Effective relief noted at this time

## 2018-09-19 NOTE — PROGRESS NOTES
Pt woke up twice overnight for snack and able to go back to sleep  Did walk out of room at one point, confused and believing bathroom was in the hallway  Pt was shown bathroom in room, utilized bathroom and returned to sleep  Currently appears to be sleeping at this time  Will monitor

## 2018-09-19 NOTE — PROGRESS NOTES
Pt speaking to staff at nurse station, some intelligible speech  Appears calmer than previous day  Medication compliant  Follows redirections

## 2018-09-20 LAB
ALBUMIN SERPL BCP-MCNC: 3.7 G/DL (ref 3.5–5)
ALP SERPL-CCNC: 138 U/L (ref 46–116)
ALT SERPL W P-5'-P-CCNC: 233 U/L (ref 12–78)
ANION GAP SERPL CALCULATED.3IONS-SCNC: 10 MMOL/L (ref 4–13)
AST SERPL W P-5'-P-CCNC: 83 U/L (ref 5–45)
BILIRUB SERPL-MCNC: 0.3 MG/DL (ref 0.2–1)
BUN SERPL-MCNC: 11 MG/DL (ref 5–25)
CALCIUM SERPL-MCNC: 9.2 MG/DL (ref 8.3–10.1)
CHLORIDE SERPL-SCNC: 104 MMOL/L (ref 100–108)
CO2 SERPL-SCNC: 25 MMOL/L (ref 21–32)
CREAT SERPL-MCNC: 0.94 MG/DL (ref 0.6–1.3)
GFR SERPL CREATININE-BSD FRML MDRD: 111 ML/MIN/1.73SQ M
GLUCOSE SERPL-MCNC: 97 MG/DL (ref 65–140)
LITHIUM SERPL-SCNC: 0.4 MMOL/L (ref 0.5–1)
POTASSIUM SERPL-SCNC: 4.4 MMOL/L (ref 3.5–5.3)
PROT SERPL-MCNC: 7.8 G/DL (ref 6.4–8.2)
SODIUM SERPL-SCNC: 139 MMOL/L (ref 136–145)

## 2018-09-20 PROCEDURE — 80178 ASSAY OF LITHIUM: CPT | Performed by: PHYSICIAN ASSISTANT

## 2018-09-20 PROCEDURE — 99232 SBSQ HOSP IP/OBS MODERATE 35: CPT | Performed by: PSYCHIATRY & NEUROLOGY

## 2018-09-20 PROCEDURE — 80053 COMPREHEN METABOLIC PANEL: CPT | Performed by: PHYSICIAN ASSISTANT

## 2018-09-20 RX ORDER — LITHIUM 8 MEQ/5ML
8 SOLUTION ORAL DAILY
Status: DISCONTINUED | OUTPATIENT
Start: 2018-09-20 | End: 2018-09-24

## 2018-09-20 RX ADMIN — OLANZAPINE 5 MG: 5 TABLET, ORALLY DISINTEGRATING ORAL at 08:04

## 2018-09-20 RX ADMIN — LITHIUM 16 MEQ: 8 SOLUTION ORAL at 17:16

## 2018-09-20 RX ADMIN — LORAZEPAM 2 MG: 1 TABLET ORAL at 18:34

## 2018-09-20 RX ADMIN — CHLORPROMAZINE HYDROCHLORIDE 100 MG: 100 TABLET, SUGAR COATED ORAL at 17:17

## 2018-09-20 RX ADMIN — LITHIUM 8 MEQ: 8 SOLUTION ORAL at 10:57

## 2018-09-20 RX ADMIN — BENZTROPINE MESYLATE 1 MG: 1 TABLET ORAL at 08:04

## 2018-09-20 RX ADMIN — BENZTROPINE MESYLATE 1 MG: 1 TABLET ORAL at 17:16

## 2018-09-20 RX ADMIN — OLANZAPINE 20 MG: 10 TABLET, ORALLY DISINTEGRATING ORAL at 20:26

## 2018-09-20 RX ADMIN — OLANZAPINE 5 MG: 5 TABLET, ORALLY DISINTEGRATING ORAL at 13:06

## 2018-09-20 NOTE — PROGRESS NOTES
At start of shift was noted that pt walked to end of [facility] and into another peers room  When this writer walked into room, found pt in the corner of the bathroom under sink  Pt states "close the door, close the door! I need pants " Noted pt was incontinent of urine  New clothing given to pt  Pt showered and changed into clean clothing  Pt states he knew this was not his room however had urge to urinate while walking [place] and thought he would be able to make it to this bathroom  Explained to pt he could only use his own bathroom which pt reports understanding of  Pt given small snack of crackers and water, then pt returned to bed  Pt awoke several times throughout the night however no irritability thus far  Mumbled speech however cooperative with staff  Currently sleeping at this time

## 2018-09-20 NOTE — PROGRESS NOTES
Extremely agitated, causing other peers to become agitated  Loud and boisterous, standing in hallway outside med room, multiple redirections and attempts at calming down, unable to respond  Thorazine prn only partially effective, ativan po adminstered at 1834, effective  Pt was able to calm down and relocated to day room with peers  Pt was calm and ate snack and accepted evening medications

## 2018-09-20 NOTE — PROGRESS NOTES
Pt at RN station throughout morning  Focused on leaving the hospital and stated that today is the day he is going home  Loud and irritable at times  Said "I was never crazy  I've been fine the whole time I've been here " Stated when he is discharged he will return to the hospital with his  and come here to work  Compliant with medicines  Does not attend groups

## 2018-09-20 NOTE — PROGRESS NOTES
Progress Note - [facility]  [address] [facility] 34 y o  male MRN: [id number]  Unit/Bed#: [facility] 252-01 Encounter: [id number]    Assessment/Plan   Principal Problem:    Paranoid schizophrenia ([address] )      Subjective:  Patient today appears less labile and agitated  Does get loud at times when trying to explain what a 303 is  He continues to not believe mental health courts ruling and says [place] court system is fake  Remains with grandiose delusions and [facility]   Did not speak about paranoid delusions today, but are present  Is suspicious of interviewer  Denied A/V/O/T hallucinations  Is seen talking to himself at times and is possibly internally preoccupied  Denied SI/HI  Remains potential for aggression  Lithium level today is  4 and we will titrate accordingly  Did not get PRN Thorazine yesterday for first time  Seen in room listening to music and singing a lot  Is medication compliant due to mouth checks  Denied somatic complaints and continues to deny side effects  Will continue to monitor LFTs  Most likely elevated due to PRN use of antipsychotics for agitation      Current Medications:  Current Facility-Administered Medications   Medication Dose Route Frequency    acetaminophen (TYLENOL) tablet 650 mg  650 mg Oral Q6H PRN    aluminum-magnesium hydroxide-simethicone (MYLANTA) 200-200-20 mg/5 mL oral suspension 30 mL  30 mL Oral Q4H PRN    benztropine (COGENTIN) injection 1 mg  1 mg Intramuscular Q6H PRN    benztropine (COGENTIN) tablet 1 mg  1 mg Oral Q6H PRN    benztropine (COGENTIN) tablet 1 mg  1 mg Oral BID    chlorproMAZINE (THORAZINE) injection SOLN 50 mg  50 mg Intramuscular Q8H PRN    chlorproMAZINE (THORAZINE) tablet 100 mg  100 mg Oral Q8H PRN    haloperidol (HALDOL) tablet 10 mg  10 mg Oral Q8H PRN    haloperidol lactate (HALDOL) injection 5 mg  5 mg Intramuscular Q6H PRN    lithium citrate oral solution 16 mEq  16 mEq Oral After Dinner    lithium citrate oral solution 8 mEq  8 mEq Oral Daily    LORazepam (ATIVAN) 2 mg/mL injection 2 mg  2 mg Intramuscular Q6H PRN    LORazepam (ATIVAN) tablet 2 mg  2 mg Oral Q6H PRN    magnesium hydroxide (MILK OF MAGNESIA) 400 mg/5 mL oral suspension 30 mL  30 mL Oral Daily PRN    nicotine polacrilex (NICORETTE) gum 2 mg  2 mg Oral Q2H PRN    OLANZapine (ZyPREXA ZYDIS) dispersible tablet 20 mg  20 mg Oral HS    OLANZapine (ZyPREXA ZYDIS) dispersible tablet 5 mg  5 mg Oral BID (AM & Afternoon)    OLANZapine (ZyPREXA) IM injection 10 mg  10 mg Intramuscular Q3H PRN    OLANZapine (ZyPREXA) tablet 10 mg  10 mg Oral Q3H PRN    traZODone (DESYREL) tablet 50 mg  50 mg Oral HS PRN       Behavioral Health Medications: all current active meds have been reviewed and continue current psychiatric medications  Vitals:  Vitals:    09/20/18 0738   BP: 127/83   Pulse: 99   Resp: 20   Temp: (!) 96 6 °F (35 9 °C)   SpO2:        Laboratory results:    I have personally reviewed all pertinent laboratory/tests results    Most Recent Labs:   Lab Results   Component Value Date    WBC 7 33 09/11/2018    RBC 5 15 09/11/2018    HGB 16 0 09/11/2018    HCT 47 5 09/11/2018     09/11/2018    RDW 12 4 09/11/2018    NEUTROABS 4 44 09/11/2018     09/20/2018    K 4 4 09/20/2018     09/20/2018    CO2 25 09/20/2018    BUN 11 09/20/2018    CREATININE 0 94 09/20/2018    GLUC 97 09/20/2018    GLUF 99 09/11/2018    CALCIUM 9 2 09/20/2018    AST 83 (H) 09/20/2018     (H) 09/20/2018    ALKPHOS 138 (H) 09/20/2018    TP 7 8 09/20/2018    ALB 3 7 09/20/2018    TBILI 0 30 09/20/2018    CHOLESTEROL 118 09/11/2018    HDL 37 (L) 09/11/2018    TRIG 49 09/11/2018    LDLCALC 71 09/11/2018    NONHDLC 81 09/11/2018    LITHIUM 0 4 (L) 09/20/2018    [id number] 2 061 09/11/2018    RPR Non-Reactive 09/11/2018       Psychiatric Review of Systems:  Behavior over the last 24 hours:  Slight improvement  Sleep: improved  Appetite: normal  Medication side effects: No  ROS: no complaints    Mental Status Evaluation:  Appearance:  casually dressed   Behavior:  guarded   Speech:  loud   Mood:  euthymic   Affect:  increased in range and less labile  less angry   Language naming objects and repeating phrases   Thought Process:  disorganized and illogical   Thought Content:  paranoid and grandiose delusions   Perceptual Disturbances: None  Possible internal preoccupation   Risk Potential: Denied SI/HI  Potential for aggression: yes   Sensorium:  person and place   Cognition:  grossly intact   Consciousness:  alert and awake    Recent and Remote Memory poor   Attention: attention span appeared shorter than expected for age   Insight:  limited   Judgment: limited   Gait/Station: normal gait/station and normal balance   Motor Activity: no abnormal movements     Progress Toward Goals: slight progression    Recommended Treatment: Continue with group therapy, milieu therapy and occupational therapy  1   Increase Lithium to 8mEq QD AM + 16mEq QD in evening for mood stabilization  2  Continue other medications  3  Repeat Lithium level and CMP on Monday  4  Remains on involuntary 303    Risks, benefits and possible side effects of Medications:   Risks, benefits, and possible side effects of medications explained to patient and patient verbalizes understanding        Ty Hernadez PA-C

## 2018-09-21 PROBLEM — F20.0 PARANOID SCHIZOPHRENIA (HCC): Chronic | Status: ACTIVE | Noted: 2018-09-11

## 2018-09-21 PROCEDURE — 99232 SBSQ HOSP IP/OBS MODERATE 35: CPT | Performed by: PSYCHIATRY & NEUROLOGY

## 2018-09-21 RX ORDER — CHLORPROMAZINE HYDROCHLORIDE 25 MG/1
50 TABLET, FILM COATED ORAL EVERY 8 HOURS PRN
Status: DISCONTINUED | OUTPATIENT
Start: 2018-09-21 | End: 2018-09-27 | Stop reason: HOSPADM

## 2018-09-21 RX ADMIN — NICOTINE POLACRILEX 2 MG: 2 GUM, CHEWING BUCCAL at 08:04

## 2018-09-21 RX ADMIN — BENZTROPINE MESYLATE 1 MG: 1 TABLET ORAL at 09:05

## 2018-09-21 RX ADMIN — LITHIUM 8 MEQ: 8 SOLUTION ORAL at 09:05

## 2018-09-21 RX ADMIN — OLANZAPINE 5 MG: 5 TABLET, ORALLY DISINTEGRATING ORAL at 09:05

## 2018-09-21 RX ADMIN — LITHIUM 16 MEQ: 8 SOLUTION ORAL at 17:09

## 2018-09-21 RX ADMIN — BENZTROPINE MESYLATE 1 MG: 1 TABLET ORAL at 17:10

## 2018-09-21 RX ADMIN — OLANZAPINE 5 MG: 5 TABLET, ORALLY DISINTEGRATING ORAL at 16:52

## 2018-09-21 RX ADMIN — LORAZEPAM 2 MG: 1 TABLET ORAL at 12:13

## 2018-09-21 RX ADMIN — NICOTINE POLACRILEX 2 MG: 2 GUM, CHEWING BUCCAL at 17:44

## 2018-09-21 NOTE — PROGRESS NOTES
Pt agitated at start of shift, postured toward male staff and speaking Trinid[place]  Pt however responded to redirection  Rambling, pressured speech  Pt requesting snack which was provided  Needing some verbal descalation however after some time, pt able to remain calm  Returned to room however coming out multiple times overnight  No further agitation noted for remainder of the night

## 2018-09-21 NOTE — PROGRESS NOTES
Patient reports feeling "fine " He is loud and agitated about being here and wants to know when he will be discharged  Continues to linger near nursing station speaking loudly in [address]  He is talking to another patient who also wants to be discharged, increasing his agitation  Difficult to redirect  Dr Kaylah Baldwin made aware of elevated liver enzymes  Adjustments made to PRN orders as he feels they are the cause for the elevation  Will continue to monitor

## 2018-09-21 NOTE — PLAN OF CARE
Problem: Ineffective Coping  Goal: Participates in unit activities  Interventions:  - Provide therapeutic environment   - Provide required programming   - Redirect inappropriate behaviors    Outcome: Not Progressing  Pt more irritable and agitated during group interaction  Frequently disrupting group discussion  Pt difficult to redirect

## 2018-09-21 NOTE — PROGRESS NOTES
Awake throughout the evening  Frequently at nurses station  Loud and agitated at times, but responds to redirection  Focused on discharged stating he is not crazy  Observed on telephone at various times this evening  States when discharged he will stay with friends and find a job  Compliant with medications  Smiling and laughing at times throughout the evening  Utilizes radio at times throughout the day

## 2018-09-21 NOTE — CASE MANAGEMENT
MALDONADO met with pt who is still showing poor insight in his hospitalization  MALDONADO provided [facility] information in pt's dc planning notes  No further needs anticipated at this time

## 2018-09-21 NOTE — PROGRESS NOTES
Progress Note - [address] [facility] 34 y o  male MRN: [id number]  Unit/Bed#: U 252-01 Encounter: [id number]    Assessment/Plan   Principal Problem:    Paranoid schizophrenia ([address] )      Subjective:  Patient seen in hallways  Was angry about remaining in hospital   Goes on disorganized rant of needing to see his daughter, signing 67 hour, how court system is fake, and how he wants to work  When asked to elaborate on these subjects we do not get very far  Does acknowledge the amount of days left on his 303  Also, reports he will take the medications  Denied SI/HI and A/V/O/T hallucinations  Internal preoccupation appears less, but is seen talking to self at times  Does become labile at times  Required PRN Thorazine last night for agitation  Angry today about being in hospital, but able to be redirected  Denied somatic complaints and stated he feels "good "  Repeat labs due Monday  Remains on mouth checks  Intellectual limitations suspected        Current Medications:  Current Facility-Administered Medications   Medication Dose Route Frequency    acetaminophen (TYLENOL) tablet 650 mg  650 mg Oral Q6H PRN    aluminum-magnesium hydroxide-simethicone (MYLANTA) 200-200-20 mg/5 mL oral suspension 30 mL  30 mL Oral Q4H PRN    benztropine (COGENTIN) injection 1 mg  1 mg Intramuscular Q6H PRN    benztropine (COGENTIN) tablet 1 mg  1 mg Oral Q6H PRN    benztropine (COGENTIN) tablet 1 mg  1 mg Oral BID    chlorproMAZINE (THORAZINE) injection SOLN 50 mg  50 mg Intramuscular Q8H PRN    chlorproMAZINE (THORAZINE) tablet 50 mg  50 mg Oral Q8H PRN    haloperidol (HALDOL) tablet 10 mg  10 mg Oral Q8H PRN    haloperidol lactate (HALDOL) injection 5 mg  5 mg Intramuscular Q6H PRN    lithium citrate oral solution 16 mEq  16 mEq Oral After Dinner    lithium citrate oral solution 8 mEq  8 mEq Oral Daily    LORazepam (ATIVAN) 2 mg/mL injection 2 mg  2 mg Intramuscular Q6H PRN    LORazepam (ATIVAN) tablet 2 mg  2 mg Oral Q6H PRN    magnesium hydroxide (MILK OF MAGNESIA) 400 mg/5 mL oral suspension 30 mL  30 mL Oral Daily PRN    nicotine polacrilex (NICORETTE) gum 2 mg  2 mg Oral Q2H PRN    OLANZapine (ZyPREXA ZYDIS) dispersible tablet 20 mg  20 mg Oral HS    OLANZapine (ZyPREXA ZYDIS) dispersible tablet 5 mg  5 mg Oral BID (AM & Afternoon)    OLANZapine (ZyPREXA) IM injection 10 mg  10 mg Intramuscular Q3H PRN    OLANZapine (ZyPREXA) tablet 10 mg  10 mg Oral Q3H PRN    traZODone (DESYREL) tablet 50 mg  50 mg Oral HS PRN       Behavioral Health Medications: all current active meds have been reviewed and continue current psychiatric medications  Vitals:  Vitals:    09/21/18 0730   BP: 155/81   Pulse: 87   Resp: 18   Temp: (!) 97 °F (36 1 °C)   SpO2:        Laboratory results:    I have personally reviewed all pertinent laboratory/tests results    Most Recent Labs:   Lab Results   Component Value Date    WBC 7 33 09/11/2018    RBC 5 15 09/11/2018    HGB 16 0 09/11/2018    HCT 47 5 09/11/2018     09/11/2018    RDW 12 4 09/11/2018    NEUTROABS 4 44 09/11/2018     09/20/2018    K 4 4 09/20/2018     09/20/2018    CO2 25 09/20/2018    BUN 11 09/20/2018    CREATININE 0 94 09/20/2018    GLUC 97 09/20/2018    GLUF 99 09/11/2018    CALCIUM 9 2 09/20/2018    AST 83 (H) 09/20/2018     (H) 09/20/2018    ALKPHOS 138 (H) 09/20/2018    TP 7 8 09/20/2018    ALB 3 7 09/20/2018    TBILI 0 30 09/20/2018    CHOLESTEROL 118 09/11/2018    HDL 37 (L) 09/11/2018    TRIG 49 09/11/2018    LDLCALC 71 09/11/2018    NONHDLC 81 09/11/2018    LITHIUM 0 4 (L) 09/20/2018    [id number] 2 061 09/11/2018    RPR Non-Reactive 09/11/2018       Psychiatric Review of Systems:  Behavior over the last 24 hours:  unchanged  Sleep: normal  Appetite: normal  Medication side effects: No  ROS: no complaints    Mental Status Evaluation:  Appearance:  improved hygiene   Behavior:  psychomotor agitation   Speech:  loud   Mood: dysthymic   Affect:  increased in range and labile   Language naming objects and repeating phrases   Thought Process:  disorganized and perserverative   Thought Content:  paranoid and grandiose delusions   Perceptual Disturbances: None  Possible internal preoccupation   Risk Potential: Denied SI/HI  Potential for aggression: yes   Sensorium:  person and place   Cognition:  grossly intact   Consciousness:  alert and awake    Recent and Remote Memory poor   Attention: attention span appeared shorter than expected for age   Insight:  limited   Judgment: limited   Gait/Station: normal gait/station and normal balance   Motor Activity: no abnormal movements     Progress Toward Goals:  Slight progression    Recommended Treatment: Continue with group therapy, milieu therapy and occupational therapy  1   Continue current medications  2  Please try to avoid PRN antipsychotics due to elevated LFTs  Consider Ativan instead  3   Lithium level and CMP due Monday  4  Remains on involuntary 303    Risks, benefits and possible side effects of Medications:   Patient does not verbalize understanding at this time and will require further explanation        Ty Hernadez PA-C

## 2018-09-22 PROCEDURE — 99232 SBSQ HOSP IP/OBS MODERATE 35: CPT | Performed by: PSYCHIATRY & NEUROLOGY

## 2018-09-22 RX ADMIN — NICOTINE POLACRILEX 2 MG: 2 GUM, CHEWING BUCCAL at 12:39

## 2018-09-22 RX ADMIN — BENZTROPINE MESYLATE 1 MG: 1 TABLET ORAL at 17:09

## 2018-09-22 RX ADMIN — LITHIUM 16 MEQ: 8 SOLUTION ORAL at 17:09

## 2018-09-22 RX ADMIN — OLANZAPINE 20 MG: 10 TABLET, ORALLY DISINTEGRATING ORAL at 01:58

## 2018-09-22 RX ADMIN — LITHIUM 8 MEQ: 8 SOLUTION ORAL at 07:58

## 2018-09-22 RX ADMIN — ACETAMINOPHEN 650 MG: 325 TABLET, FILM COATED ORAL at 18:37

## 2018-09-22 RX ADMIN — NICOTINE POLACRILEX 2 MG: 2 GUM, CHEWING BUCCAL at 08:13

## 2018-09-22 RX ADMIN — BENZTROPINE MESYLATE 1 MG: 1 TABLET ORAL at 07:57

## 2018-09-22 RX ADMIN — NICOTINE POLACRILEX 2 MG: 2 GUM, CHEWING BUCCAL at 18:37

## 2018-09-22 RX ADMIN — OLANZAPINE 20 MG: 10 TABLET, ORALLY DISINTEGRATING ORAL at 21:25

## 2018-09-22 RX ADMIN — LORAZEPAM 2 MG: 1 TABLET ORAL at 11:07

## 2018-09-22 RX ADMIN — OLANZAPINE 5 MG: 5 TABLET, ORALLY DISINTEGRATING ORAL at 14:11

## 2018-09-22 RX ADMIN — OLANZAPINE 5 MG: 5 TABLET, ORALLY DISINTEGRATING ORAL at 07:57

## 2018-09-22 NOTE — PROGRESS NOTES
Pt has been hanging out in hallway and at nurse's station most of the day, labile, agitated at times and speaking loudly in [place]  He is very difficult to redirect, begins posturing and motioning with hands  He denies anxiety, depression, S/H/I  Appears to be RIS  Pt has been compliant with medications  Will continue to monitor, provide support and encouragement

## 2018-09-22 NOTE — PROGRESS NOTES
Progress Note - [address] 34 y o  male MRN: [id number]   Unit/Bed#: U 252-01 Encounter: [id number]    Behavior over the last 24 hours: mannie Jorge remains agitated at times, irritable and labile  He responds to staff redirection, but still frequently requires PRN medications  Focusing on discharge, arguing today that he wants to sign 72 hour notice (he is on 303 commitment)  Has been taking medications  Sleep: normal  Appetite: normal  Medication side effects: No   ROS: no complaints, denies any shortness of breath, chest pain or abdominal pain    Mental Status Evaluation:    Appearance:  disheveled   Behavior:  cooperative, but easily agitated   Speech:  loud, tangential   Mood:  dysphoric, irritable   Affect:  labile   Thought Process:  disorganized, illogical, perseverative   Associations: tangential associations   Thought Content:  paranoid delusions   Perceptual Disturbances: denies auditory or visual hallucinations when asked, but appears distracted   Risk Potential: Suicidal ideation - None  Homicidal ideation - None  Potential for aggression - Yes, due to poor impulse control   Sensorium:  oriented to person and place, disoriented to time/date   Memory:  recent memory impaired, remote memory impaired   Consciousness:  alert and awake   Attention: poor concentration and poor attention span   Insight:  poor   Judgment: poor   Gait/Station: normal gait/station and normal balance   Motor Activity: no abnormal movements     Vital signs in last 24 hours:    Temp:  [96 5 °F (35 8 °C)-97 3 °F (36 3 °C)] 96 5 °F (35 8 °C)  HR:  [] 97  Resp:  [16] 16  BP: (135-136)/(73-83) 136/83    Laboratory results:  I have personally reviewed all pertinent laboratory/tests results      Most Recent Labs:   Lab Results   Component Value Date    WBC 7 33 09/11/2018    RBC 5 15 09/11/2018    HGB 16 0 09/11/2018    HCT 47 5 09/11/2018     09/11/2018    RDW 12 4 09/11/2018 NEUTROABS 4 44 09/11/2018     09/20/2018    K 4 4 09/20/2018     09/20/2018    CO2 25 09/20/2018    BUN 11 09/20/2018    CREATININE 0 94 09/20/2018    GLUC 97 09/20/2018    GLUF 99 09/11/2018    CALCIUM 9 2 09/20/2018    AST 83 (H) 09/20/2018     (H) 09/20/2018    ALKPHOS 138 (H) 09/20/2018    TP 7 8 09/20/2018    ALB 3 7 09/20/2018    TBILI 0 30 09/20/2018    CHOLESTEROL 118 09/11/2018    HDL 37 (L) 09/11/2018    TRIG 49 09/11/2018    LDLCALC 71 09/11/2018    NONHDLC 81 09/11/2018    LITHIUM 0 4 (L) 09/20/2018    [id number] 2 061 09/11/2018    RPR Non-Reactive 09/11/2018   Drug Screen:   Lab Results   Component Value Date    AMPMETHUR Negative 09/09/2018    BARBTUR Negative 09/09/2018    BDZUR Negative 09/09/2018    THCUR Positive (A) 09/09/2018    COCAINEUR Negative 09/09/2018    METHADONEUR Negative 09/09/2018    OPIATEUR Negative 09/09/2018    PCPUR Negative 09/09/2018       Progress Toward Goals: no significant progress, still labile, insight remains poor, still has psychotic symptoms    Assessment/Plan   Principal Problem:    Paranoid schizophrenia ([facility] [address] )    Recommended Treatment:     Planned medication and treatment changes: All current active medications have been reviewed  Encourage group therapy, milieu therapy and occupational therapy  Behavioral Health checks every 5 minutes  On 303 commitment up to 20 days    Check Lithium level and CMP in 2 days     Continue current medications:    Current Facility-Administered Medications:  acetaminophen 650 mg Oral Q6H PRN Willian Starcher, PA-C   aluminum-magnesium hydroxide-simethicone 30 mL Oral Q4H PRN Willian Starcher, PA-C   benztropine 1 mg Intramuscular Q6H PRN Willian Starcher, PA-C   benztropine 1 mg Oral Q6H PRN Willian Starcher, PA-C   benztropine 1 mg Oral BID Willian Starcher, PA-FRANKIE   chlorproMAZINE 50 mg Intramuscular Q8H PRN Alvarado Bright   chlorproMAZINE 50 mg Oral Q8H PRN Alvarado Bright   haloperidol 10 mg Oral Q8H PRN [facility]   haloperidol lactate 5 mg Intramuscular Q6H PRN Billy Canton, PA-C   lithium citrate 16 mEq Oral After Dinner [facility]   lithium citrate 8 mEq Oral Daily Billy Canton, PA-C   LORazepam 2 mg Intramuscular Q6H PRN Billy Savannah, PA-C   LORazepam 2 mg Oral Q6H PRN [facility]   magnesium hydroxide 30 mL Oral Daily PRN Billy Canton, PA-C   nicotine polacrilex 2 mg Oral Q2H PRN Billy Canton, PA-C   OLANZapine 20 mg Oral HS [facility]   OLANZapine 5 mg Oral BID (AM & Afternoon) [facility]   OLANZapine 10 mg Intramuscular Q3H PRN Billy Savannah, PA-C   OLANZapine 10 mg Oral Q3H PRN Billy Canton, PA-C   traZODone 50 mg Oral HS PRN Billy Canton, PA-C       Risks / Benefits of Treatment:    Risks, benefits, and possible side effects of medications explained to patient  Patient does not verbalize understanding of treatment at this time and will require further explanation  Counseling / Coordination of Care:    Patient's progress reviewed with nursing staff  Medications, treatment progress and treatment plan reviewed with patient      James Bo MD 09/22/18

## 2018-09-22 NOTE — PROGRESS NOTES
Pt awake a few times overnight requesting snacks which was given  Pt then able to return to room and fall back asleep  No irritability noted overnight  Overall pt slept the majority of the night

## 2018-09-23 PROCEDURE — 99232 SBSQ HOSP IP/OBS MODERATE 35: CPT | Performed by: PSYCHIATRY & NEUROLOGY

## 2018-09-23 RX ADMIN — OLANZAPINE 20 MG: 10 TABLET, ORALLY DISINTEGRATING ORAL at 21:20

## 2018-09-23 RX ADMIN — LITHIUM 8 MEQ: 8 SOLUTION ORAL at 08:05

## 2018-09-23 RX ADMIN — OLANZAPINE 5 MG: 5 TABLET, ORALLY DISINTEGRATING ORAL at 13:52

## 2018-09-23 RX ADMIN — LORAZEPAM 2 MG: 1 TABLET ORAL at 08:07

## 2018-09-23 RX ADMIN — TRAZODONE HYDROCHLORIDE 50 MG: 50 TABLET ORAL at 01:59

## 2018-09-23 RX ADMIN — LORAZEPAM 2 MG: 1 TABLET ORAL at 14:08

## 2018-09-23 RX ADMIN — LITHIUM 16 MEQ: 8 SOLUTION ORAL at 17:13

## 2018-09-23 RX ADMIN — BENZTROPINE MESYLATE 1 MG: 1 TABLET ORAL at 17:13

## 2018-09-23 RX ADMIN — OLANZAPINE 5 MG: 5 TABLET, ORALLY DISINTEGRATING ORAL at 08:05

## 2018-09-23 RX ADMIN — BENZTROPINE MESYLATE 1 MG: 1 TABLET ORAL at 08:05

## 2018-09-23 NOTE — PLAN OF CARE
Problem: Alteration in Thoughts and Perception  Goal: Treatment Goal: Gain control of psychotic behaviors/thinking, reduce/eliminate presenting symptoms and demonstrate improved reality functioning upon discharge  Outcome: Progressing    Goal: Refrain from acting on delusional thinking/internal stimuli  Interventions:  - Monitor patient closely, per order   - Utilize least restrictive measures   - Set reasonable limits, give positive feedback for acceptable   - Administer medications as ordered and monitor of potential side effects   Outcome: Progressing    Goal: Agree to be compliant with medication regime, as prescribed and report medication side effects  Interventions:  - Offer appropriate PRN medication and supervise ingestion; conduct aims, as needed    Outcome: Progressing

## 2018-09-23 NOTE — PROGRESS NOTES
0057 Pt arrived at med station anxious and agitated, swearing and talking loudly  He was not verbally redirectable and received Ativan prn which was effective  Will monitor

## 2018-09-23 NOTE — PROGRESS NOTES
Pacing mccartney becoming increasingly agitated and verbally aggressive , also threatening medicated to help maintain control,

## 2018-09-23 NOTE — PROGRESS NOTES
Progress Note - [address] 34 y o  male MRN: [id number]   Unit/Bed#: U 252-01 Encounter: [id number]    Behavior over the last 24 hours: unchanged  Eduard Hinojosa is still agitated at times and irritable, still has periods when he is not able to be redirected and requires PRN medications  Insight remains poor - states he does not have mental illness and continues to demand discharge during the session today  Minimal participation in milieu  Has been taking medications  Sleep: normal  Appetite: normal  Medication side effects: No   ROS: no complaints, denies any headache, shortness of breath or abdominal pain    Mental Status Evaluation:    Appearance:  disheveled   Behavior:  easily agitated   Speech:  loud, tangential   Mood:  dysphoric, irritable   Affect:  labile   Thought Process:  disorganized, illogical, perseverative   Associations: tangential associations   Thought Content:  paranoid delusions   Perceptual Disturbances: denies auditory or visual hallucinations when asked, but appears responding to internal stimuli   Risk Potential: Suicidal ideation - None  Homicidal ideation - None  Potential for aggression - Yes, due to poor impulse control   Sensorium:  oriented to person and place, disoriented to time/date   Memory:  recent memory mildly impaired, remote memory mildly impaired   Consciousness:  alert and awake   Attention: poor concentration and poor attention span   Insight:  poor   Judgment: poor   Gait/Station: normal gait/station and normal balance   Motor Activity: no abnormal movements     Vital signs in last 24 hours:    Temp:  [96 8 °F (36 °C)-96 9 °F (36 1 °C)] 96 8 °F (36 °C)  HR:  [93-94] 94  Resp:  [18] 18  BP: (129-138)/(77-88) 129/88    Laboratory results:  I have personally reviewed all pertinent laboratory/tests results      Progress Toward Goals: no progress, still irritable, remains labile, still psychotic, poor insight    Assessment/Plan   Principal Problem:    Paranoid schizophrenia ([facility] [address] )    Recommended Treatment:     Planned medication and treatment changes: All current active medications have been reviewed  Encourage group therapy, milieu therapy and occupational therapy  Behavioral Health checks every 5 minutes  On 303 commitment up to 20 days  Check Lithium level and CMP in the morning     Continue current medications:    Current Facility-Administered Medications:  acetaminophen 650 mg Oral Q6H PRN Trista Wallace PA-C   aluminum-magnesium hydroxide-simethicone 30 mL Oral Q4H PRN Trista Wallace PA-C   benztropine 1 mg Intramuscular Q6H PRN Trista Wallace, PA-FRANKIE   benztropine 1 mg Oral Q6H PRN Trista Wallace, PA-FRANKIE   benztropine 1 mg Oral BID Trista Wallace PA-C   chlorproMAZINE 50 mg Intramuscular Q8H PRN Alvarado Bright   chlorproMAZINE 50 mg Oral Q8H PRN [facility]   haloperidol 10 mg Oral Q8H PRN [facility]   haloperidol lactate 5 mg Intramuscular Q6H PRN Trista Wallace PA-C   lithium citrate 16 mEq Oral After Dinner Dodie Marie   lithium citrate 8 mEq Oral Daily Trista Wallace PA-C   LORazepam 2 mg Intramuscular Q6H PRN Trista Wallace PA-C   LORazepam 2 mg Oral Q6H PRN [facility]   magnesium hydroxide 30 mL Oral Daily PRN Trista Wallace PA-C   nicotine polacrilex 2 mg Oral Q2H PRN MOISES Kitchen-C   OLANZapine 20 mg Oral HS [facility]   OLANZapine 5 mg Oral BID (AM & Afternoon) [facility]   OLANZapine 10 mg Intramuscular Q3H PRN Trista Wallace PA-C   OLANZapine 10 mg Oral Q3H PRN MOISES Kitchen-C   traZODone 50 mg Oral HS PRN MOISES Kitchen-C       Risks / Benefits of Treatment:    Risks, benefits, and possible side effects of medications explained to patient  Patient does not verbalize understanding of treatment at this time and will require further explanation        Counseling / Coordination of Care:    Patient's progress reviewed with nursing staff   Medications, treatment progress and treatment plan reviewed with patient      James Mcgraw MD 09/23/18

## 2018-09-23 NOTE — PROGRESS NOTES
Was given Trazodone 50 mg po prn/sleep at 0159  Good effect obtained, as observed asleep in bed within the hr & remains asleep presently

## 2018-09-23 NOTE — PROGRESS NOTES
Pt very verbal and expressive during shift, appears to verbalize having an issue at times, but is able to respond to staff appropriately  Pt listens to music and rapping in room, loiters around nursing desk, roaming in halls  Appropriate interactions, redirectable  Medication compliant  Able to joke with staff  Appears more comfortable on unit

## 2018-09-24 LAB
ALBUMIN SERPL BCP-MCNC: 3.5 G/DL (ref 3.5–5)
ALP SERPL-CCNC: 147 U/L (ref 46–116)
ALT SERPL W P-5'-P-CCNC: 198 U/L (ref 12–78)
ANION GAP SERPL CALCULATED.3IONS-SCNC: 8 MMOL/L (ref 4–13)
AST SERPL W P-5'-P-CCNC: 58 U/L (ref 5–45)
BILIRUB SERPL-MCNC: 0.2 MG/DL (ref 0.2–1)
BUN SERPL-MCNC: 11 MG/DL (ref 5–25)
CALCIUM SERPL-MCNC: 9.5 MG/DL (ref 8.3–10.1)
CHLORIDE SERPL-SCNC: 103 MMOL/L (ref 100–108)
CO2 SERPL-SCNC: 30 MMOL/L (ref 21–32)
CREAT SERPL-MCNC: 0.86 MG/DL (ref 0.6–1.3)
GFR SERPL CREATININE-BSD FRML MDRD: 120 ML/MIN/1.73SQ M
GLUCOSE P FAST SERPL-MCNC: 94 MG/DL (ref 65–99)
GLUCOSE SERPL-MCNC: 94 MG/DL (ref 65–140)
LITHIUM SERPL-SCNC: 0.4 MMOL/L (ref 0.5–1)
POTASSIUM SERPL-SCNC: 4 MMOL/L (ref 3.5–5.3)
PROT SERPL-MCNC: 7.6 G/DL (ref 6.4–8.2)
SODIUM SERPL-SCNC: 141 MMOL/L (ref 136–145)

## 2018-09-24 PROCEDURE — 99232 SBSQ HOSP IP/OBS MODERATE 35: CPT | Performed by: PSYCHIATRY & NEUROLOGY

## 2018-09-24 PROCEDURE — 80178 ASSAY OF LITHIUM: CPT | Performed by: PHYSICIAN ASSISTANT

## 2018-09-24 PROCEDURE — 80053 COMPREHEN METABOLIC PANEL: CPT | Performed by: PHYSICIAN ASSISTANT

## 2018-09-24 RX ORDER — LITHIUM 8 MEQ/5ML
8 SOLUTION ORAL ONCE
Status: COMPLETED | OUTPATIENT
Start: 2018-09-24 | End: 2018-09-24

## 2018-09-24 RX ORDER — LITHIUM 8 MEQ/5ML
8 SOLUTION ORAL ONCE
Status: DISCONTINUED | OUTPATIENT
Start: 2018-09-24 | End: 2018-09-24

## 2018-09-24 RX ORDER — LITHIUM 8 MEQ/5ML
16 SOLUTION ORAL DAILY
Status: DISCONTINUED | OUTPATIENT
Start: 2018-09-25 | End: 2018-09-27 | Stop reason: HOSPADM

## 2018-09-24 RX ORDER — LITHIUM 8 MEQ/5ML
16 SOLUTION ORAL DAILY
Status: DISCONTINUED | OUTPATIENT
Start: 2018-09-25 | End: 2018-09-24

## 2018-09-24 RX ADMIN — OLANZAPINE 5 MG: 5 TABLET, ORALLY DISINTEGRATING ORAL at 14:22

## 2018-09-24 RX ADMIN — BENZTROPINE MESYLATE 1 MG: 1 TABLET ORAL at 17:04

## 2018-09-24 RX ADMIN — OLANZAPINE 5 MG: 5 TABLET, ORALLY DISINTEGRATING ORAL at 08:53

## 2018-09-24 RX ADMIN — OLANZAPINE 20 MG: 10 TABLET, ORALLY DISINTEGRATING ORAL at 21:24

## 2018-09-24 RX ADMIN — LORAZEPAM 2 MG: 1 TABLET ORAL at 08:52

## 2018-09-24 RX ADMIN — LITHIUM 16 MEQ: 8 SOLUTION ORAL at 17:04

## 2018-09-24 RX ADMIN — CHLORPROMAZINE HYDROCHLORIDE 50 MG: 25 TABLET, SUGAR COATED ORAL at 09:50

## 2018-09-24 RX ADMIN — BENZTROPINE MESYLATE 1 MG: 1 TABLET ORAL at 08:53

## 2018-09-24 RX ADMIN — LITHIUM 8 MEQ: 8 SOLUTION ORAL at 08:53

## 2018-09-24 RX ADMIN — HALOPERIDOL 10 MG: 5 TABLET ORAL at 17:44

## 2018-09-24 NOTE — PROGRESS NOTES
Patient was calm this morning  Discussing his plans to work upon discharge  Patient became increasingly loud and agitated as the morning progressed  Focused on not wanting to be here and wanting to be discharged  Unable to verbally deescalate or redirect  PRN Thorazine effective  Patient appeared more relaxed and less vocal  Will continue to monitor

## 2018-09-24 NOTE — PLAN OF CARE
Problem: Ineffective Coping  Goal: Participates in unit activities  Interventions:  - Provide therapeutic environment   - Provide required programming   - Redirect inappropriate behaviors    Outcome: Not Progressing  Pt declined to attend all therapeutic groups today

## 2018-09-24 NOTE — PROGRESS NOTES
Patient no longer displaying agitation, he reports effectiveness of medication without negative side effects

## 2018-09-24 NOTE — PROGRESS NOTES
Progress Note - [address] [facility]  [address] [facility] 34 y o  male MRN: [id number]  Unit/Bed#: [facility] 252-01 Encounter: [id number]    Assessment/Plan   Principal Problem:    Paranoid schizophrenia ([address] )      Subjective:  Patient agitated that he remains hospitalized  Continues with disorganized and perseverative thought process  Does not comprehend diagnosis, 303 process, and has poor future planning  Reports wanting to leave and find a job  Did require PRN Thorazine and Ativan today due to yelling, posturing, and inability to redirect  Does not participate in groups and hangs out in the hallways  Was explained how many days are left on 303 and what a 304 is  Lithium level today subtherapeutic and will titrate accordingly  Repeat labs Thursday  LFT's trending downward  Is on mouth checks and denied somatic complaints        Current Medications:  Current Facility-Administered Medications   Medication Dose Route Frequency    acetaminophen (TYLENOL) tablet 650 mg  650 mg Oral Q6H PRN    aluminum-magnesium hydroxide-simethicone (MYLANTA) 200-200-20 mg/5 mL oral suspension 30 mL  30 mL Oral Q4H PRN    benztropine (COGENTIN) injection 1 mg  1 mg Intramuscular Q6H PRN    benztropine (COGENTIN) tablet 1 mg  1 mg Oral Q6H PRN    benztropine (COGENTIN) tablet 1 mg  1 mg Oral BID    chlorproMAZINE (THORAZINE) injection SOLN 50 mg  50 mg Intramuscular Q8H PRN    chlorproMAZINE (THORAZINE) tablet 50 mg  50 mg Oral Q8H PRN    haloperidol (HALDOL) tablet 10 mg  10 mg Oral Q8H PRN    haloperidol lactate (HALDOL) injection 5 mg  5 mg Intramuscular Q6H PRN    lithium citrate oral solution 16 mEq  16 mEq Oral After Dinner    [START ON 9/25/2018] lithium citrate oral solution 16 mEq  16 mEq Oral Daily    LORazepam (ATIVAN) 2 mg/mL injection 2 mg  2 mg Intramuscular Q6H PRN    LORazepam (ATIVAN) tablet 2 mg  2 mg Oral Q6H PRN    magnesium hydroxide (MILK OF MAGNESIA) 400 mg/5 mL oral suspension 30 mL  30 mL Oral Daily PRN    nicotine polacrilex (NICORETTE) gum 2 mg  2 mg Oral Q2H PRN    OLANZapine (ZyPREXA ZYDIS) dispersible tablet 20 mg  20 mg Oral HS    OLANZapine (ZyPREXA ZYDIS) dispersible tablet 5 mg  5 mg Oral BID (AM & Afternoon)    OLANZapine (ZyPREXA) IM injection 10 mg  10 mg Intramuscular Q3H PRN    OLANZapine (ZyPREXA) tablet 10 mg  10 mg Oral Q3H PRN    traZODone (DESYREL) tablet 50 mg  50 mg Oral HS PRN       Behavioral Health Medications: all current active meds have been reviewed and continue current psychiatric medications  Vitals:  Vitals:    09/24/18 0803   BP: 147/88   Pulse: 91   Resp: 18   Temp: (!) 97 4 °F (36 3 °C)   SpO2:        Laboratory results:    I have personally reviewed all pertinent laboratory/tests results    Most Recent Labs:   Lab Results   Component Value Date    WBC 7 33 09/11/2018    RBC 5 15 09/11/2018    HGB 16 0 09/11/2018    HCT 47 5 09/11/2018     09/11/2018    RDW 12 4 09/11/2018    NEUTROABS 4 44 09/11/2018     09/24/2018    K 4 0 09/24/2018     09/24/2018    CO2 30 09/24/2018    BUN 11 09/24/2018    CREATININE 0 86 09/24/2018    GLUC 94 09/24/2018    GLUF 94 09/24/2018    CALCIUM 9 5 09/24/2018    AST 58 (H) 09/24/2018     (H) 09/24/2018    ALKPHOS 147 (H) 09/24/2018    TP 7 6 09/24/2018    ALB 3 5 09/24/2018    TBILI 0 20 09/24/2018    CHOLESTEROL 118 09/11/2018    HDL 37 (L) 09/11/2018    TRIG 49 09/11/2018    LDLCALC 71 09/11/2018    NONHDLC 81 09/11/2018    LITHIUM 0 4 (L) 09/24/2018    [id number] 2 061 09/11/2018    RPR Non-Reactive 09/11/2018       Psychiatric Review of Systems:  Behavior over the last 24 hours:  unchanged  Sleep: varies  Appetite: normal  Medication side effects: No  ROS: no complaints    Mental Status Evaluation:  Appearance:  disheveled   Behavior:  psychomotor agitation   Speech:  loud   Mood:  dysthymic   Affect:  labile   Language naming objects and repeating phrases   Thought Process:  disorganized and perserverative Thought Content:  paranoid and grandiose delusions   Perceptual Disturbances: None  Appears internally preoccupied   Risk Potential: Denied SI/HI  Potential for aggression: yes   Sensorium:  person and place   Cognition:  grossly intact   Consciousness:  awake    Recent and Remote Memory poor   Attention: attention span appeared shorter than expected for age   Insight:  limited   Judgment: limited   Gait/Station: normal gait/station and normal balance   Motor Activity: no abnormal movements     Progress Toward Goals: unchanged    Recommended Treatment: Continue with group therapy, milieu therapy and occupational therapy  1   Increase Lithium to 16mEq (600mg) BID for mood stabilization  2  Lithium level and CMP for Thursday  3  Patient remains on involuntary 303  4  Continue other medications    Risks, benefits and possible side effects of Medications:   Patient does not verbalize understanding at this time and will require further explanation        Maritza Hotron PA-C

## 2018-09-24 NOTE — PROGRESS NOTES
Patient repeatedly asking this writer to get a cigarette out of his locker for him to smoke in the bathroom  Informed him each time that I cannot do that

## 2018-09-24 NOTE — PROGRESS NOTES
Pt at nurse's desk at least ten times throughout shift asking about Doctor, getting out, getting a ride to [facility]  Lays down for a couple of hours and is at desk again  Attempt to assure and redirect to bed

## 2018-09-25 PROCEDURE — 99232 SBSQ HOSP IP/OBS MODERATE 35: CPT | Performed by: PSYCHIATRY & NEUROLOGY

## 2018-09-25 RX ADMIN — NICOTINE POLACRILEX 2 MG: 2 GUM, CHEWING BUCCAL at 18:21

## 2018-09-25 RX ADMIN — LITHIUM 16 MEQ: 8 SOLUTION ORAL at 17:21

## 2018-09-25 RX ADMIN — OLANZAPINE 5 MG: 5 TABLET, ORALLY DISINTEGRATING ORAL at 08:15

## 2018-09-25 RX ADMIN — OLANZAPINE 5 MG: 5 TABLET, ORALLY DISINTEGRATING ORAL at 14:14

## 2018-09-25 RX ADMIN — LORAZEPAM 2 MG: 1 TABLET ORAL at 12:51

## 2018-09-25 RX ADMIN — BENZTROPINE MESYLATE 1 MG: 1 TABLET ORAL at 08:14

## 2018-09-25 RX ADMIN — LITHIUM 16 MEQ: 8 SOLUTION ORAL at 08:15

## 2018-09-25 RX ADMIN — BENZTROPINE MESYLATE 1 MG: 1 TABLET ORAL at 17:21

## 2018-09-25 RX ADMIN — OLANZAPINE 20 MG: 10 TABLET, ORALLY DISINTEGRATING ORAL at 21:08

## 2018-09-25 NOTE — PROGRESS NOTES
Progress Note - [address] [facility]  [address]  [address] [facility] 34 y o  male MRN: [id number]  Unit/Bed#: [facility] 252-01 Encounter: [id number]    Assessment/Plan   Principal Problem:    Paranoid schizophrenia ([address] )      Subjective:  Patient today was less agitated and labile when discussing when he will be discharged  Agreed for labwork on Thursday and perseverates on discharge  Does remain with limited insight that he has no mental illness (Anosognosia), paranoid delusions that the [employer] system is fake, that his family conspired to put him in hospital, and grandiose delusions of suing the hospital   Today was less loud and able to take redirection better in conversation  Can be loud at times  Did receive PRN Haldol last night for agitation  Has not required PRN today so far  Appears less preoccupied  Denies hallucinations, SI/HI  Remains on mouth checks  Lithium level due Thursday  Repeat CMP then to reevaluate LFTs      Current Medications:  Current Facility-Administered Medications   Medication Dose Route Frequency    acetaminophen (TYLENOL) tablet 650 mg  650 mg Oral Q6H PRN    aluminum-magnesium hydroxide-simethicone (MYLANTA) 200-200-20 mg/5 mL oral suspension 30 mL  30 mL Oral Q4H PRN    benztropine (COGENTIN) injection 1 mg  1 mg Intramuscular Q6H PRN    benztropine (COGENTIN) tablet 1 mg  1 mg Oral Q6H PRN    benztropine (COGENTIN) tablet 1 mg  1 mg Oral BID    chlorproMAZINE (THORAZINE) injection SOLN 50 mg  50 mg Intramuscular Q8H PRN    chlorproMAZINE (THORAZINE) tablet 50 mg  50 mg Oral Q8H PRN    haloperidol (HALDOL) tablet 10 mg  10 mg Oral Q8H PRN    haloperidol lactate (HALDOL) injection 5 mg  5 mg Intramuscular Q6H PRN    lithium citrate oral solution 16 mEq  16 mEq Oral After Dinner    lithium citrate oral solution 16 mEq  16 mEq Oral Daily    LORazepam (ATIVAN) 2 mg/mL injection 2 mg  2 mg Intramuscular Q6H PRN    LORazepam (ATIVAN) tablet 2 mg  2 mg Oral Q6H PRN    magnesium hydroxide (MILK OF MAGNESIA) 400 mg/5 mL oral suspension 30 mL  30 mL Oral Daily PRN    nicotine polacrilex (NICORETTE) gum 2 mg  2 mg Oral Q2H PRN    OLANZapine (ZyPREXA ZYDIS) dispersible tablet 20 mg  20 mg Oral HS    OLANZapine (ZyPREXA ZYDIS) dispersible tablet 5 mg  5 mg Oral BID (AM & Afternoon)    OLANZapine (ZyPREXA) IM injection 10 mg  10 mg Intramuscular Q3H PRN    OLANZapine (ZyPREXA) tablet 10 mg  10 mg Oral Q3H PRN    traZODone (DESYREL) tablet 50 mg  50 mg Oral HS PRN       Behavioral Health Medications: all current active meds have been reviewed and continue current psychiatric medications  Vitals:  Vitals:    09/25/18 0711   BP: 123/72   Pulse: 96   Resp: 18   Temp: (!) 96 7 °F (35 9 °C)   SpO2:        Laboratory results:    I have personally reviewed all pertinent laboratory/tests results    Most Recent Labs:   Lab Results   Component Value Date    WBC 7 33 09/11/2018    RBC 5 15 09/11/2018    HGB 16 0 09/11/2018    HCT 47 5 09/11/2018     09/11/2018    RDW 12 4 09/11/2018    NEUTROABS 4 44 09/11/2018     09/24/2018    K 4 0 09/24/2018     09/24/2018    CO2 30 09/24/2018    BUN 11 09/24/2018    CREATININE 0 86 09/24/2018    GLUC 94 09/24/2018    GLUF 94 09/24/2018    CALCIUM 9 5 09/24/2018    AST 58 (H) 09/24/2018     (H) 09/24/2018    ALKPHOS 147 (H) 09/24/2018    TP 7 6 09/24/2018    ALB 3 5 09/24/2018    TBILI 0 20 09/24/2018    CHOLESTEROL 118 09/11/2018    HDL 37 (L) 09/11/2018    TRIG 49 09/11/2018    LDLCALC 71 09/11/2018    NONHDLC 81 09/11/2018    LITHIUM 0 4 (L) 09/24/2018    [id number] 2 061 09/11/2018    RPR Non-Reactive 09/11/2018       Psychiatric Review of Systems:  Behavior over the last 24 hours:  improved  Sleep: normal  Appetite: normal  Medication side effects: No  ROS: no complaints    Mental Status Evaluation:  Appearance:  improved hygiene   Behavior:  restless and less agitated   Speech:  varies   Mood:  anxious   Affect:  increased in range Language naming objects and repeating phrases   Thought Process:  disorganized and perserverative   Thought Content:  Paranoid and grandiose delusions   Perceptual Disturbances: None  Appears less preoccupied   Risk Potential: Denied SI/HI  Potential for violence: yes   Sensorium:  person and place   Cognition:  grossly intact   Consciousness:  alert and awake    Recent and Remote Memory poor   Attention: attention span appeared shorter than expected for age   Insight:  limited   Judgment: limited   Gait/Station: normal gait/station and normal balance   Motor Activity: no abnormal movements     Progress Toward Goals: some progression    Recommended Treatment: Continue with group therapy, milieu therapy and occupational therapy  1   Continue current medications  2  Lithium level and CMP on Thursday  3  Disposition planning  4  Remains on 303    Risks, benefits and possible side effects of Medications:   Patient does not verbalize understanding at this time and will require further explanation        Star Godfrey PA-C

## 2018-09-25 NOTE — PROGRESS NOTES
Pt becoming more agitated after lunch  At Rn station yelling  Unclear what he was angry about  Asking to get baby's mom's number out of locker  Irritable when reminded of locker times  Becoming louder, yelling and cursing to self in room  Given ativan prn at 1251

## 2018-09-25 NOTE — PROGRESS NOTES
Pt wandering halls throughout shift  Observed in groups this morning  Lingers at RN station at times  Has irritable edge at times  At desk stating he did not want medications this morning "because those are to give to people who are crazy and I am not crazy " Pt was then in med line waiting for meds and yelled "give me my pills" when he had been standing in line waiting behind peer  Overall cooperative thus far

## 2018-09-25 NOTE — PLAN OF CARE
Problem: Ineffective Coping  Goal: Participates in unit activities  Interventions:  - Provide therapeutic environment   - Provide required programming   - Redirect inappropriate behaviors    Outcome: Progressing  Pt remains disorganized during group interaction, however presented as calm and social with peers  Not agitation/irritability noted during a m  Groups

## 2018-09-26 PROCEDURE — 99232 SBSQ HOSP IP/OBS MODERATE 35: CPT | Performed by: PSYCHIATRY & NEUROLOGY

## 2018-09-26 RX ADMIN — BENZTROPINE MESYLATE 1 MG: 1 TABLET ORAL at 08:57

## 2018-09-26 RX ADMIN — TRAZODONE HYDROCHLORIDE 50 MG: 50 TABLET ORAL at 21:16

## 2018-09-26 RX ADMIN — OLANZAPINE 5 MG: 5 TABLET, ORALLY DISINTEGRATING ORAL at 08:57

## 2018-09-26 RX ADMIN — OLANZAPINE 5 MG: 5 TABLET, ORALLY DISINTEGRATING ORAL at 13:22

## 2018-09-26 RX ADMIN — LITHIUM 16 MEQ: 8 SOLUTION ORAL at 17:19

## 2018-09-26 RX ADMIN — LITHIUM 16 MEQ: 8 SOLUTION ORAL at 08:57

## 2018-09-26 RX ADMIN — OLANZAPINE 20 MG: 10 TABLET, ORALLY DISINTEGRATING ORAL at 21:16

## 2018-09-26 RX ADMIN — BENZTROPINE MESYLATE 1 MG: 1 TABLET ORAL at 17:19

## 2018-09-26 NOTE — PROGRESS NOTES
Pt woke once overnight requesting crackers  Pt remained pleasant and was speaking with staff for a few minutes before returning to room  Asleep shortly after returning to room and currently appears to be asleep at this time

## 2018-09-26 NOTE — DISCHARGE INSTR - OTHER ORDERS
[facility]  [address], [facility], [place] [address]  Tel: [phone]  [phone])    [facility] Crisis    Crisis Intervention: [address] is a confidential 7 days/week telephone support service manned by trained mental health consumers   [employer] operates daily but is not able to [address]   · [facility] provides support, a listening ear and can provide information about available services       · [facility] specializes in the concerns of mental health consumers, their families and friends   However, we are also here for anyone who has a mental health concern, is confused about or just doesn't know anything about mental health or where to get information       To reach [place], call [phone] accepts calls between 6:00 AM to 10:00 AM and from 4:00 PM to 74:76 AM or click on the link to view additional information  TO APPLY FOR SHELTER IN THE [facility]: ([address])    1  Obtain a housing voucher at the [address] [place], [place] [address]d at [place]  2  Photo identification will be required  3  Come to the [facility] [address] between 3:30 P  M  and 7:30 P M  (Dinner is served at 5:30 P M )    Para poder ser [employer] en esta MISSION tienes que hacer lo siguiente:    1  Tienes que obtener un boleto (voucher) de en la estacion de policia [place] [address] [address] [place]  2  [place] Al[place] con retrato es requerida    3  La entrada a la [place] es de 3:30 P M  Y 7:30 P M  (La comida sera servida a las 5:30 P M )

## 2018-09-26 NOTE — PROGRESS NOTES
Pt pleasant and cooperative this shift  At RN station less frequently  No agitation/irritability observed  Talking with staff and peers  Attends some groups  Remains compliant with medicines

## 2018-09-26 NOTE — CASE MANAGEMENT
MALDONADO met with pt who is hopeful of dc soon  SW relayed to pt that his lithium level will be checked on 9/27/18 with possible discharge Thursday  Pt reports that he is going to go to [address] [place] [address] to live with his mother for a few days and then go to \[facility]\"" to live with relatives and friends  Pt was provided with information on the [facility] in case pt needs short term accomodation  Pt reports he will not go back to live with his grandmother and sister in [facility] as pt feels they are the ones that caused him to be admitted to [facility] in the first place  Pt states he has friends who can take him to [place] at time of dc who operate a taxi service

## 2018-09-26 NOTE — PROGRESS NOTES
Progress Note - [address] [facility]  [address]  [address] [facility] 34 y o  male MRN: [id number]  Unit/Bed#: [facility] 252-01 Encounter: [id number]    Assessment/Plan   Principal Problem:    Paranoid schizophrenia ([address] )      Subjective:  Patient more cooperative and less angry after learning of potential discharge tomorrow  Does require Lithium level and CMP tomorrow  Was able to have more appropriate conversation without yelling  Can be more redirected easier  Has plan to go to [place] to live with family and eventually move back to [facility]  Will deny most psychiatric symptoms, is superficial, and focused on discharge  Remains somewhat disorganized and perseverate  Denied SI/HI and psychosis  Does not endorse criteria for andrew  Remains on mouth checks  Appears to be tolerating medications well without serious side effects       Current Medications:  Current Facility-Administered Medications   Medication Dose Route Frequency    acetaminophen (TYLENOL) tablet 650 mg  650 mg Oral Q6H PRN    aluminum-magnesium hydroxide-simethicone (MYLANTA) 200-200-20 mg/5 mL oral suspension 30 mL  30 mL Oral Q4H PRN    benztropine (COGENTIN) injection 1 mg  1 mg Intramuscular Q6H PRN    benztropine (COGENTIN) tablet 1 mg  1 mg Oral Q6H PRN    benztropine (COGENTIN) tablet 1 mg  1 mg Oral BID    chlorproMAZINE (THORAZINE) injection SOLN 50 mg  50 mg Intramuscular Q8H PRN    chlorproMAZINE (THORAZINE) tablet 50 mg  50 mg Oral Q8H PRN    haloperidol (HALDOL) tablet 10 mg  10 mg Oral Q8H PRN    haloperidol lactate (HALDOL) injection 5 mg  5 mg Intramuscular Q6H PRN    lithium citrate oral solution 16 mEq  16 mEq Oral After Dinner    lithium citrate oral solution 16 mEq  16 mEq Oral Daily    LORazepam (ATIVAN) 2 mg/mL injection 2 mg  2 mg Intramuscular Q6H PRN    LORazepam (ATIVAN) tablet 2 mg  2 mg Oral Q6H PRN    magnesium hydroxide (MILK OF MAGNESIA) 400 mg/5 mL oral suspension 30 mL  30 mL Oral Daily PRN    nicotine polacrilex (NICORETTE) gum 2 mg  2 mg Oral Q2H PRN    OLANZapine (ZyPREXA ZYDIS) dispersible tablet 20 mg  20 mg Oral HS    OLANZapine (ZyPREXA ZYDIS) dispersible tablet 5 mg  5 mg Oral BID (AM & Afternoon)    OLANZapine (ZyPREXA) IM injection 10 mg  10 mg Intramuscular Q3H PRN    OLANZapine (ZyPREXA) tablet 10 mg  10 mg Oral Q3H PRN    traZODone (DESYREL) tablet 50 mg  50 mg Oral HS PRN       Behavioral Health Medications: all current active meds have been reviewed and continue current psychiatric medications  Vitals:  Vitals:    09/26/18 0732   BP: 135/83   Pulse: 83   Resp: 15   Temp: (!) 96 4 °F (35 8 °C)   SpO2:        Laboratory results:    I have personally reviewed all pertinent laboratory/tests results    Most Recent Labs:   Lab Results   Component Value Date    WBC 7 33 09/11/2018    RBC 5 15 09/11/2018    HGB 16 0 09/11/2018    HCT 47 5 09/11/2018     09/11/2018    RDW 12 4 09/11/2018    NEUTROABS 4 44 09/11/2018     09/24/2018    K 4 0 09/24/2018     09/24/2018    CO2 30 09/24/2018    BUN 11 09/24/2018    CREATININE 0 86 09/24/2018    GLUC 94 09/24/2018    GLUF 94 09/24/2018    CALCIUM 9 5 09/24/2018    AST 58 (H) 09/24/2018     (H) 09/24/2018    ALKPHOS 147 (H) 09/24/2018    TP 7 6 09/24/2018    ALB 3 5 09/24/2018    TBILI 0 20 09/24/2018    CHOLESTEROL 118 09/11/2018    HDL 37 (L) 09/11/2018    TRIG 49 09/11/2018    LDLCALC 71 09/11/2018    NONHDLC 81 09/11/2018    LITHIUM 0 4 (L) 09/24/2018    [id number] 2 061 09/11/2018    RPR Non-Reactive 09/11/2018       Psychiatric Review of Systems:  Behavior over the last 24 hours:  improved  Sleep: normal  Appetite: normal  Medication side effects: No  ROS: no complaints    Mental Status Evaluation:  Appearance:  casually dressed   Behavior:  more cooperative   Speech:  less loud   Mood:  anxious   Affect:  brighter, less angry   Language naming objects and repeating phrases   Thought Process:  disorganized and perserverative Thought Content:  paranoid and grandiose delusions - less   Perceptual Disturbances: None   Risk Potential: Denied SI/HI  Potential for aggression: less   Sensorium:  person and place   Cognition:  grossly intact   Consciousness:  alert and awake    Recent and Remote Memory poor   Attention: attention span appeared shorter than expected for age   Insight:  limited   Judgment: improved   Gait/Station: normal gait/station and normal balance   Motor Activity: no abnormal movements     Progress Toward Goals: progression    Recommended Treatment: Continue with group therapy, milieu therapy and occupational therapy  1   Continue current medications  2  Lithium level and CMP due tomorrow  3  Disposition planning with tentative discharge tomorrow     Risks, benefits and possible side effects of Medications:   Risks, benefits, and possible side effects of medications explained to patient and patient verbalizes understanding        Kishor Chi PA-C

## 2018-09-26 NOTE — PROGRESS NOTES
Pt denies SI/HI or depression  Compliant with medications  Attended evening group and social with peers  Was loud at nurses station talking, however, quieted his voice when asked and was redirected to walk away for privacy of information  Was cooperative throughout the shift

## 2018-09-27 VITALS
OXYGEN SATURATION: 95 % | HEART RATE: 94 BPM | WEIGHT: 143 LBS | BODY MASS INDEX: 20.02 KG/M2 | HEIGHT: 71 IN | SYSTOLIC BLOOD PRESSURE: 142 MMHG | TEMPERATURE: 96.2 F | RESPIRATION RATE: 20 BRPM | DIASTOLIC BLOOD PRESSURE: 83 MMHG

## 2018-09-27 LAB
ALBUMIN SERPL BCP-MCNC: 3.7 G/DL (ref 3.5–5)
ALP SERPL-CCNC: 139 U/L (ref 46–116)
ALT SERPL W P-5'-P-CCNC: 265 U/L (ref 12–78)
ANION GAP SERPL CALCULATED.3IONS-SCNC: 6 MMOL/L (ref 4–13)
AST SERPL W P-5'-P-CCNC: 96 U/L (ref 5–45)
BILIRUB SERPL-MCNC: 0.4 MG/DL (ref 0.2–1)
BUN SERPL-MCNC: 9 MG/DL (ref 5–25)
CALCIUM SERPL-MCNC: 9.7 MG/DL (ref 8.3–10.1)
CHLORIDE SERPL-SCNC: 102 MMOL/L (ref 100–108)
CO2 SERPL-SCNC: 32 MMOL/L (ref 21–32)
CREAT SERPL-MCNC: 0.98 MG/DL (ref 0.6–1.3)
GFR SERPL CREATININE-BSD FRML MDRD: 106 ML/MIN/1.73SQ M
GLUCOSE SERPL-MCNC: 90 MG/DL (ref 65–140)
LITHIUM SERPL-SCNC: 0.5 MMOL/L (ref 0.5–1)
POTASSIUM SERPL-SCNC: 3.8 MMOL/L (ref 3.5–5.3)
PROT SERPL-MCNC: 8 G/DL (ref 6.4–8.2)
SODIUM SERPL-SCNC: 140 MMOL/L (ref 136–145)

## 2018-09-27 PROCEDURE — 99239 HOSP IP/OBS DSCHRG MGMT >30: CPT | Performed by: PSYCHIATRY & NEUROLOGY

## 2018-09-27 PROCEDURE — 80053 COMPREHEN METABOLIC PANEL: CPT | Performed by: PHYSICIAN ASSISTANT

## 2018-09-27 PROCEDURE — 80178 ASSAY OF LITHIUM: CPT | Performed by: PHYSICIAN ASSISTANT

## 2018-09-27 RX ORDER — LITHIUM CARBONATE 600 MG/1
600 CAPSULE ORAL 2 TIMES DAILY WITH MEALS
Qty: 60 CAPSULE | Refills: 1 | Status: SHIPPED | OUTPATIENT
Start: 2018-09-27 | End: 2018-10-08 | Stop reason: HOSPADM

## 2018-09-27 RX ORDER — OLANZAPINE 20 MG/1
20 TABLET ORAL
Qty: 30 TABLET | Refills: 1 | Status: ON HOLD | OUTPATIENT
Start: 2018-09-27 | End: 2018-10-08

## 2018-09-27 RX ORDER — OLANZAPINE 5 MG/1
5 TABLET ORAL 2 TIMES DAILY
Qty: 60 TABLET | Refills: 1 | Status: ON HOLD | OUTPATIENT
Start: 2018-09-27 | End: 2018-10-08

## 2018-09-27 RX ORDER — BENZTROPINE MESYLATE 1 MG/1
1 TABLET ORAL 2 TIMES DAILY
Qty: 60 TABLET | Refills: 1 | Status: SHIPPED | OUTPATIENT
Start: 2018-09-27 | End: 2018-10-08 | Stop reason: HOSPADM

## 2018-09-27 RX ADMIN — LITHIUM 16 MEQ: 8 SOLUTION ORAL at 09:06

## 2018-09-27 RX ADMIN — LORAZEPAM 2 MG: 1 TABLET ORAL at 09:07

## 2018-09-27 RX ADMIN — OLANZAPINE 5 MG: 5 TABLET, ORALLY DISINTEGRATING ORAL at 09:05

## 2018-09-27 RX ADMIN — BENZTROPINE MESYLATE 1 MG: 1 TABLET ORAL at 09:05

## 2018-09-27 NOTE — DISCHARGE INSTRUCTIONS
Schizophrenia   WHAT YOU NEED TO KNOW:   Schizophrenia is a long-term mental disease that affects how your brain works  It is a disease that may change how you think, feel, and behave  You may not be able to know what is real and what is not real  Your thoughts may not be clear, or may jump from one topic to another  DISCHARGE INSTRUCTIONS:   Medicines:   · Antipsychotics: These help decrease psychotic symptoms and severe agitation  You may need antiparkinson medicine to control muscle stiffness, twitches, and restlessness caused by antipsychotic medicines  · Antianxiety medicine: This medicine may be given to decrease anxiety and help you feel calm and relaxed  · Antidepressants: These help with symptoms of depression and anxiety  · Mood stabilizers: These control mood swings  · Tranquilizers: These increase feelings of being calm and relaxed  · Take your medicine as directed  Contact your healthcare provider if you think your medicine is not helping or if you have side effects  Tell him or her if you are allergic to any medicine  Keep a list of the medicines, vitamins, and herbs you take  Include the amounts, and when and why you take them  Bring the list or the pill bottles to follow-up visits  Carry your medicine list with you in case of an emergency  Follow up with your healthcare provider or psychiatrist as directed:  Write down your questions so you remember to ask them during your visits  Treatment settings: You may need to continue your treatments after you leave the hospital  You may be treated in the following programs:  · Crisis residential program:  This is a program where you live in a home-care facility  Healthcare providers work in these homes just like in hospitals  This program is helpful especially when you are having a relapse (your symptoms return)  · Day treatment program:  This program provides a chance to learn and practice skills   This also provides long-term support so you may have an improved quality of life  · Outpatient program:  An outpatient program is when you meet regularly with your therapist  Kevynchristine Brooks may meet one-to-one with your therapist, or you might meet with your therapist in a group  · Partial care program:  A partial care program is also called day hospitalization or partial hospitalization  This is group therapy and lasts 4 to 6 hours a day, 3 to 5 days a week  It may help you avoid going into the hospital or help you get out of the hospital sooner  It may also help you get symptoms under control and avoid a relapse  Therapy:   · Assertive community treatment:  A team of healthcare providers or psychiatrists and support groups in your community help you with your therapy  · Cognitive behavior therapy: This therapy helps you to change certain behaviors  It will help you handle symptoms such as hallucinations and delusions  · Illness-management skills: This type of therapy teaches you what you can do to help manage your disease  · Family psychoeducation:  Your family will be part of your therapy  · Social skills training: This training helps you learn how to get along with other people  · Supported employment: This is a form of therapy where you are placed into a job that fits your skills  It will help give you independence and self-confidence  Manage your symptoms:   · Do not stop taking your medicines:  Tell your healthcare provider or psychiatrist if you have any problems with or questions about your medicines  · Do not stop your therapies: It is normal to have doubts about or feel discomfort with your therapy  Tell your healthcare provider or psychiatrist if you are not comfortable or have questions about your therapies  · Get regular sleep:  Try to get 6 to 8 hours of sleep each night  Tell your healthcare provider or psychiatrist if you are not able to sleep, or if you are sleeping too much      · Do not drink alcohol: Alcohol interacts with medicine used to treat schizophrenia  For support and more information:   · [employer] [facility]  [address] [facility]  , [address] [facility] , [address] [facility]  Phone: [phone]  Phone: [phone]  Web Address: http://www yang com/  [employer]  · [address] [facility], Public Information & Communication Branch  [address], [address], [address]  [place] [address]   Phone: [phone]  Phone: [phone]  Web Address: [facility] tn  Contact your healthcare provider or psychiatrist if:   · You feel that you are having symptoms of schizophrenia  · You are not able to sleep well, or are sleeping more than usual     · You cannot eat or are eating more than usual     · You have questions or concerns about your condition or care  Seek care immediately or call 911 if:   · You think about killing yourself or someone else  · You have a rash, swelling, or trouble breathing after you take your medicine  © 2017 [address] [employer] Information is for End User's use only and may not be sold, redistributed or otherwise used for commercial purposes  All illustrations and images included in CareNotes® are the copyrighted property of A D A M , Inc  or Santy Dotson  The above information is an  only  It is not intended as medical advice for individual conditions or treatments  Talk to your doctor, nurse or pharmacist before following any medical regimen to see if it is safe and effective for you

## 2018-09-27 NOTE — PLAN OF CARE
Problem: Ineffective Coping  Goal: Free from restraint events  - Utilize least restrictive measures   - Provide behavioral interventions   - Redirect inappropriate behaviors    Outcome: Completed Date Met: 09/27/18

## 2018-09-27 NOTE — DISCHARGE SUMMARY
Discharge Summary - [address] [facility]  [address] [facility] 34 y o  male MRN: [id number]  Unit/Bed#: -01 Encounter: [id number]     Admission Date: 9/10/18        Discharge Date: 9/27/18    Attending Psychiatrist: Azeem Foster MD, Johnny Cassidy III, DO    Reason for Admission/HPI:   History of Present Illness   Patient is a 34year old male who presented to [place] [address] ED by police due to homicidal ideation towards family (mother and sister), suicidal ideation without plan, erratic behavior, and agitation  Patient was rambling in ED about not being able to find a job and was overall a poor historian  Precipitating events were due to reported noncompliance to psychiatric medications  Family members reported patient has history of multiple inpatient psychiatric hospitalizations in [place], typically is noncompliant with medications, and has diagnosis of Schizophrenia  He was brought in under involuntary 302  In ED he required control team and IM antipsychotics + anxiolytics for agitation  On unit patient was agitated, labile, internally preoccupied, disorganized speech, tangential thought process, appear paranoid, and required constant need for redirection  It was difficult to get full psychiatric history due to being a poor historian  Psychosocial Stressors: medication noncompliance      Hospital Course:   Behavioral Health Medications:   current meds:   Current Facility-Administered Medications   Medication Dose Route Frequency    acetaminophen (TYLENOL) tablet 650 mg  650 mg Oral Q6H PRN    aluminum-magnesium hydroxide-simethicone (MYLANTA) 200-200-20 mg/5 mL oral suspension 30 mL  30 mL Oral Q4H PRN    benztropine (COGENTIN) injection 1 mg  1 mg Intramuscular Q6H PRN    benztropine (COGENTIN) tablet 1 mg  1 mg Oral Q6H PRN    benztropine (COGENTIN) tablet 1 mg  1 mg Oral BID    chlorproMAZINE (THORAZINE) injection SOLN 50 mg  50 mg Intramuscular Q8H PRN    chlorproMAZINE (THORAZINE) tablet 50 mg  50 mg Oral Q8H PRN    haloperidol (HALDOL) tablet 10 mg  10 mg Oral Q8H PRN    haloperidol lactate (HALDOL) injection 5 mg  5 mg Intramuscular Q6H PRN    lithium citrate oral solution 16 mEq  16 mEq Oral After Dinner    lithium citrate oral solution 16 mEq  16 mEq Oral Daily    LORazepam (ATIVAN) 2 mg/mL injection 2 mg  2 mg Intramuscular Q6H PRN    LORazepam (ATIVAN) tablet 2 mg  2 mg Oral Q6H PRN    magnesium hydroxide (MILK OF MAGNESIA) 400 mg/5 mL oral suspension 30 mL  30 mL Oral Daily PRN    nicotine polacrilex (NICORETTE) gum 2 mg  2 mg Oral Q2H PRN    OLANZapine (ZyPREXA ZYDIS) dispersible tablet 20 mg  20 mg Oral HS    OLANZapine (ZyPREXA ZYDIS) dispersible tablet 5 mg  5 mg Oral BID (AM & Afternoon)    OLANZapine (ZyPREXA) IM injection 10 mg  10 mg Intramuscular Q3H PRN    OLANZapine (ZyPREXA) tablet 10 mg  10 mg Oral Q3H PRN    traZODone (DESYREL) tablet 50 mg  50 mg Oral HS PRN       Patient was admitted to [place] [address]  Unit on involuntary 302 commitment (extended to 303) for safety and stabilization  On admission patient was placed on Zyprexa 10mg HS for mood stabilization/psychosis/agitation management  Early in hospitalization patient was very disorganized, psychotic, labile, agitated, and hard to redirect  He did require multiple PRN IM and PO antipsychotics + anxiolytics  He also required single room due to level of paranoia and did have involvement in control teams  He did not require 4 point restraints and did resistantly take medications  Over course of hospitalization patient had Zyprexa titrated to 5mg BID (9AM & 2PM) + 20mg HS  Lithium Citrate 8mEq (300mg) BID was then started for mood stabilization  It was titrated to Lithium Citrate 16mEq (600mg) BID  Lithium level on 9/27/18 was   5 and deemed therapeutic  He tolerated final medications with no serious side effects    LFTs were elevated and remained mildly elevated until end of hospitalization  He did not have any GI complaints or show signs of distress  On days leading up to discharge patient was more cooperative, able to have a roommate, attending groups, not agitated, more organized and medication compliant (was on mouth checks)  On day of discharge patient was more organized, denied psychosis, did not meet criteria for andrew, denied SI/HI, and was not agitated  He was deemed no longer a threat to himself or others  Mental Status at time of Discharge:     Appearance:  casually dressed   Behavior:  more cooperative   Speech:  normal pitch and normal volume   Mood:  euthymic   Affect:  brighter   Thought Process:  more organized, perseverative   Thought Content:  paranoid and grandiose delusions - less   Perceptual Disturbances: None   Risk Potential: Denied SI/HI  Potential for violence: low   Sensorium:  person, place and time/date   Cognition:  grossly intact   Consciousness:  alert and awake    Attention: attention span appeared shorter than expected for age   Insight:  limited   Judgment: improved   Gait/Station: normal gait/station and normal balance   Motor Activity: no abnormal movements       Discharge Diagnosis:   Paranoid schizophrenia       Discharge Medications:  See after visit summary for reconciled discharge medications provided to patient and family  Discharge instructions/Information to patient and family:   See after visit summary for information provided to patient and family  Provisions for Follow-Up Care:  See after visit summary for information related to follow-up care and any pertinent home health orders  Discharge Statement   I spent 35 minutes discharging the patient  This time was spent on the day of discharge  I had direct contact with the patient on the day of discharge  On day of discharge patient had mental status exam performed, discharge instructions/medications reviewed, and outpatient planning discussed    He was given 1 month plus 1 refill of scripts  He denied tobacco cessation therapy      Kaylen Jo PA-C

## 2018-09-27 NOTE — PROGRESS NOTES
Continues to  Stay at nurses station looking out window mumbles when talking  Difficult to inderstand

## 2018-09-27 NOTE — DISCHARGE INSTR - LAB
Contact Information: If you have any questions, concerns, pended studies, tests and/or procedures, or emergencies regarding your inpatient behavioral health visit  Please contact [facility] behavioral health unit [phone] and ask to speak to a , nurse or physician  A contact is available 24 hours/ 7 days a week at this number  Summary of Procedures Performed During your Stay:  Below is a list of major procedures performed during your hospital stay and a summary of results:  - No major procedures performed  Pending Studies     None        If studies are pending at discharge, follow up with your PCP and/or referring provider

## 2018-09-27 NOTE — PROGRESS NOTES
Pt given trazodone for sleep around 2117  Pt appears sleepy but is still walking around the unit  Pt encouraged to lay down and try and sleep

## 2018-09-27 NOTE — PROGRESS NOTES
Awake and present in milieu  Social with peers  Denies SI/HI  Encouraged compliance with medications and OP appointments  Encouraged compliance with medications and OP appointments  Plans to obtain employment upon discharge  When asked where he will reside and he mentioned he would speak with grandmother and ask if he remained quiet could he stay until he finds alternative housing  Friendly during interaction  No questions or concerns

## 2018-10-02 ENCOUNTER — HOSPITAL ENCOUNTER (EMERGENCY)
Facility: HOSPITAL | Age: 28
End: 2018-10-02
Attending: EMERGENCY MEDICINE | Admitting: EMERGENCY MEDICINE

## 2018-10-02 ENCOUNTER — HOSPITAL ENCOUNTER (INPATIENT)
Facility: HOSPITAL | Age: 28
LOS: 6 days | Discharge: HOME | DRG: 885 | End: 2018-10-08
Attending: PSYCHIATRY & NEUROLOGY | Admitting: PSYCHIATRY & NEUROLOGY

## 2018-10-02 VITALS
RESPIRATION RATE: 18 BRPM | HEART RATE: 94 BPM | TEMPERATURE: 98.7 F | OXYGEN SATURATION: 100 % | WEIGHT: 141.09 LBS | DIASTOLIC BLOOD PRESSURE: 63 MMHG | SYSTOLIC BLOOD PRESSURE: 118 MMHG | BODY MASS INDEX: 19.68 KG/M2

## 2018-10-02 DIAGNOSIS — F29 PSYCHOSIS (HCC): ICD-10-CM

## 2018-10-02 DIAGNOSIS — Z79.899 MEDICATION MANAGEMENT: Primary | ICD-10-CM

## 2018-10-02 DIAGNOSIS — Z79.899 MEDICATION MANAGEMENT: ICD-10-CM

## 2018-10-02 DIAGNOSIS — F20.0 PARANOID SCHIZOPHRENIA (HCC): Primary | Chronic | ICD-10-CM

## 2018-10-02 LAB
AMPHETAMINES SERPL QL SCN: NEGATIVE
BARBITURATES UR QL: NEGATIVE
BENZODIAZ UR QL: NEGATIVE
COCAINE UR QL: NEGATIVE
ETHANOL EXG-MCNC: 0 MG/DL
METHADONE UR QL: NEGATIVE
OPIATES UR QL SCN: NEGATIVE
PCP UR QL: NEGATIVE
THC UR QL: NEGATIVE

## 2018-10-02 PROCEDURE — 99285 EMERGENCY DEPT VISIT HI MDM: CPT

## 2018-10-02 PROCEDURE — 80307 DRUG TEST PRSMV CHEM ANLYZR: CPT | Performed by: EMERGENCY MEDICINE

## 2018-10-02 PROCEDURE — 82075 ASSAY OF BREATH ETHANOL: CPT | Performed by: EMERGENCY MEDICINE

## 2018-10-02 RX ORDER — TRAZODONE HYDROCHLORIDE 50 MG/1
50 TABLET ORAL
Status: CANCELLED | OUTPATIENT
Start: 2018-10-02

## 2018-10-02 RX ORDER — HYDROXYZINE HYDROCHLORIDE 25 MG/1
25 TABLET, FILM COATED ORAL EVERY 4 HOURS PRN
Status: DISCONTINUED | OUTPATIENT
Start: 2018-10-02 | End: 2018-10-03

## 2018-10-02 RX ORDER — LORAZEPAM 1 MG/1
1 TABLET ORAL EVERY 4 HOURS PRN
Status: DISCONTINUED | OUTPATIENT
Start: 2018-10-02 | End: 2018-10-08 | Stop reason: HOSPADM

## 2018-10-02 RX ORDER — OLANZAPINE 10 MG/1
5 INJECTION, POWDER, LYOPHILIZED, FOR SOLUTION INTRAMUSCULAR EVERY 4 HOURS PRN
Status: DISCONTINUED | OUTPATIENT
Start: 2018-10-02 | End: 2018-10-03

## 2018-10-02 RX ORDER — LORAZEPAM 1 MG/1
1 TABLET ORAL EVERY 4 HOURS PRN
Status: CANCELLED | OUTPATIENT
Start: 2018-10-02

## 2018-10-02 RX ORDER — TRAZODONE HYDROCHLORIDE 50 MG/1
50 TABLET ORAL
Status: DISCONTINUED | OUTPATIENT
Start: 2018-10-02 | End: 2018-10-03

## 2018-10-02 RX ORDER — ACETAMINOPHEN 325 MG/1
650 TABLET ORAL EVERY 6 HOURS PRN
Status: CANCELLED | OUTPATIENT
Start: 2018-10-02

## 2018-10-02 RX ORDER — LORAZEPAM 2 MG/ML
1 INJECTION INTRAMUSCULAR EVERY 4 HOURS PRN
Status: CANCELLED | OUTPATIENT
Start: 2018-10-02

## 2018-10-02 RX ORDER — ZIPRASIDONE MESYLATE 20 MG/ML
10 INJECTION, POWDER, LYOPHILIZED, FOR SOLUTION INTRAMUSCULAR ONCE
Status: DISCONTINUED | OUTPATIENT
Start: 2018-10-02 | End: 2018-10-02 | Stop reason: HOSPADM

## 2018-10-02 RX ORDER — NICOTINE 21 MG/24HR
21 PATCH, TRANSDERMAL 24 HOURS TRANSDERMAL ONCE
Status: DISCONTINUED | OUTPATIENT
Start: 2018-10-02 | End: 2018-10-02 | Stop reason: HOSPADM

## 2018-10-02 RX ORDER — OLANZAPINE 10 MG/1
5 INJECTION, POWDER, LYOPHILIZED, FOR SOLUTION INTRAMUSCULAR EVERY 4 HOURS PRN
Status: CANCELLED | OUTPATIENT
Start: 2018-10-02

## 2018-10-02 RX ORDER — BENZTROPINE MESYLATE 1 MG/ML
1 INJECTION INTRAMUSCULAR; INTRAVENOUS EVERY 6 HOURS PRN
Status: DISCONTINUED | OUTPATIENT
Start: 2018-10-02 | End: 2018-10-08 | Stop reason: HOSPADM

## 2018-10-02 RX ORDER — OLANZAPINE 5 MG/1
5 TABLET ORAL EVERY 4 HOURS PRN
Status: DISCONTINUED | OUTPATIENT
Start: 2018-10-02 | End: 2018-10-03

## 2018-10-02 RX ORDER — BENZTROPINE MESYLATE 1 MG/1
1 TABLET ORAL EVERY 6 HOURS PRN
Status: DISCONTINUED | OUTPATIENT
Start: 2018-10-02 | End: 2018-10-08 | Stop reason: HOSPADM

## 2018-10-02 RX ORDER — OLANZAPINE 5 MG/1
5 TABLET ORAL EVERY 4 HOURS PRN
Status: CANCELLED | OUTPATIENT
Start: 2018-10-02

## 2018-10-02 RX ORDER — BENZTROPINE MESYLATE 0.5 MG/1
1 TABLET ORAL EVERY 6 HOURS PRN
Status: CANCELLED | OUTPATIENT
Start: 2018-10-02

## 2018-10-02 RX ORDER — ACETAMINOPHEN 325 MG/1
650 TABLET ORAL EVERY 6 HOURS PRN
Status: DISCONTINUED | OUTPATIENT
Start: 2018-10-02 | End: 2018-10-03

## 2018-10-02 RX ORDER — BENZTROPINE MESYLATE 1 MG/ML
1 INJECTION INTRAMUSCULAR; INTRAVENOUS EVERY 6 HOURS PRN
Status: CANCELLED | OUTPATIENT
Start: 2018-10-02

## 2018-10-02 RX ORDER — LORAZEPAM 1 MG/1
2 TABLET ORAL ONCE
Status: DISCONTINUED | OUTPATIENT
Start: 2018-10-02 | End: 2018-10-02 | Stop reason: HOSPADM

## 2018-10-02 RX ORDER — HYDROXYZINE HYDROCHLORIDE 25 MG/1
25 TABLET, FILM COATED ORAL EVERY 4 HOURS PRN
Status: CANCELLED | OUTPATIENT
Start: 2018-10-02

## 2018-10-02 RX ORDER — LORAZEPAM 2 MG/ML
1 INJECTION INTRAMUSCULAR ONCE
Status: DISCONTINUED | OUTPATIENT
Start: 2018-10-02 | End: 2018-10-02 | Stop reason: HOSPADM

## 2018-10-02 RX ORDER — LORAZEPAM 2 MG/ML
1 INJECTION INTRAMUSCULAR EVERY 4 HOURS PRN
Status: DISCONTINUED | OUTPATIENT
Start: 2018-10-02 | End: 2018-10-08 | Stop reason: HOSPADM

## 2018-10-02 NOTE — ED NOTES
Pt refusing Ativan and Nicotine patch  Pt stating, "I want to prove to you that I do not need to take the medications  I am going to be calm " Dr Karma Mcfarlane aware       Mariel Mcclellan RN  10/02/18 5988

## 2018-10-02 NOTE — LETTER
Section I - General Information    Name of Patient: Beth Modi                 : 1990    Medicare #:____________________  Transport Date: 10/02/18 (PCS is valid for round trips on this date and for all repetitive trips in the 60-day range as noted below )  Origin: [id number]                                                         [facility]________________  Is the pt's stay covered under Medicare Part A (PPS/DRG)     (_) YES  (X) NO  Closest appropriate facility? (X) YES  (_) NO  If no, why is transport to more distant facility required?________________________  If hosp-hosp transfer, describe services needed at 2nd facility not available at 1st facility? _________________________________  If hospice pt, is this transport related to pt's terminal illness? (_) YES (X) NO Describe____________________________________    Section II - Medical Necessity Questionnaire  Ambulance transportation is medically necessary only if other means of transport are contraindicated or would be potentially harmful to the patient  To meet this requirement, the patient must either be "bed confined" or suffer from a condition such that transport by means other than ambulance is contraindicated by the patient's condition   The following questions must be answered by the medical professional signing below for this form to be valid:    1)  Describe the MEDICAL CONDITION (physical and/or mental) of this patient AT [address] [place] [address] that requires the patient to be transported in an ambulance and why transport by other means is contraindicated by the patient's condition:__________________________________________________________________________________________________    2) Is the patient "bed confined" as defined below?     (_) YES  (X) NO  To be "be confined" the patient must satisfy all three of the following conditions: (1) unable to get up from bed without Assistance; AND (2) unable to ambulate; AND (3) unable to sit in a chair or wheelchair  3) Can this patient safely be transported by car or wheelchair Liyahhien Souravs (i e , seated during transport without a medical attendant or monitoring)?   (_) YES  (X NO    4) In addition to completing questions 1-3 above, please check any of the following conditions that apply*:  *Note: supporting documentation for any boxes checked must be maintained in the patient's medical records  (_)Contractures   (_)Non-Healed Fractures  (_)Patient is confused (_)Patient is comatose (_)Moderate/severe pain on movement (X)Danger to self/others  (_)IV meds/fluids required (_)Patient is combative(_)Need or possible need for restraints (_)DVT requires elevation of lower extremity  (_)Medical attendant required (_)Requires oxygen-unable to self administer (_)Special handling/isolation/infection control precautions required (_)Unable to tolerate seated position for time needed to transport (_)Hemodynamic monitoring required en route (_)Unable to sit in a chair or wheelchair due to decubitus ulcers or other wounds (_)Cardiac monitoring required en route (_)Morbid obesity requires additional personnel/equipment to safely handle patient (_)Orthopedic device (backboard, halo, pins, traction, brace, wedge, etc,) requiring special handling during transport (_)Other(specify)_______________________________________________    Section III - Signature of Physician or Healthcare Professional  I certify that the above information is true and correct based on my evaluation of this patient, and represent that the patient requires transport by ambulance and that other forms of transport are contraindicated   I understand that this information will be used by the Centers for Medicare and Medicaid Services (CMS) to support the determination of medical necessity for ambulance services, and I represent that I have personal knowledge of the patient's condition at time of transport  (_) If this box is checked, I also certify that the patient is physically or mentally incapable of signing the ambulance service's claim and that the institution with which I am affiliated has furnished care, services, or assistance to the patient  My signature below is made on behalf of the patient pursuant to 42 CFR §424 36(b)(4)  In accordance with 42 CFR §424 37, the specific reason(s) that the patient is physically or mentally incapable of signing the claim form is as follows: _________________________________________________________________________________________________________      Signature of Physician* or Healthcare Professional______________________________________________________________  Signature Date 10/02/18 (For scheduled repetitive transports, this form is not valid for transports performed more than 60 days after this date)    Printed Name & Credentials of Physician or Healthcare Professional (MD, DO, RN, etc )________________________________  *Form must be signed by patient's attending physician for scheduled, repetitive transports   For non-repetitive, unscheduled ambulance transports, if unable to obtain the signature of the attending physician, any of the following may sign (choose appropriate option below)  (_) Physician Assistant (_)  Clinical Nurse Specialist (_)  Registered Nurse  (_)  Nurse Practitioner  (X) Discharge Planner

## 2018-10-02 NOTE — ED NOTES
[facility]  Crisis worker reporting that the family is afraid of the patient  Patient's mom wants to [phone] the patient  Pt was discharged from [facility] on 9/27        Milagros Marcos RN  10/02/18 9363

## 2018-10-02 NOTE — ED NOTES
Pt observed to be yelling at someone in his room  Pt hitting his fists against bed  Pt remains under constant supervision at this time       Mariel Mcclellan RN  10/02/18 0125

## 2018-10-02 NOTE — ED NOTES
Davee Schaumann, crisis worker, and [facility] at bedside       Rashmi Freeman RN  10/02/18 1800

## 2018-10-02 NOTE — LETTER
[facility]  [address] [address]  Dept: [phone]      EMTALA TRANSFER CONSENT    NAME Lul Holland                                         1990                              MRN [id number]    I have been informed of my rights regarding examination, treatment, and transfer   by Dr Jeovany Teague DO    Benefits: Specialized equipment and/or services available at the receiving facility (Include comment)________________________    Risks:        Consent for Transfer:  I acknowledge that my medical condition has been evaluated and explained to me by the emergency department physician or other qualified medical person and/or my attending physician, who has recommended that I be transferred to the service of  Accepting Physician: DR Timoteo Chen at [address] [facility] Name, [address] : [address]  The above potential benefits of such transfer, the potential risks associated with such transfer, and the probable risks of not being transferred have been explained to me, and I fully understand them  The doctor has explained that, in my case, the benefits of transfer outweigh the risks  I agree to be transferred  I authorize the performance of emergency medical procedures and treatments upon me in both transit and upon arrival at the receiving facility  Additionally, I authorize the release of any and all medical records to the receiving facility and request they be transported with me, if possible  I understand that the safest mode of transportation during a medical emergency is an ambulance and that the Hospital advocates the use of this mode of transport  Risks of traveling to the receiving facility by car, including absence of medical control, life sustaining equipment, such as oxygen, and medical personnel has been explained to me and I fully understand them      (BRIAN CORRECT BOX BELOW)  Brian Enrique ]  I consent to the stated transfer and to be transported by ambulance/helicopter  [  ]  I consent to the stated transfer, but refuse transportation by ambulance and accept full responsibility for my transportation by car  I understand the risks of non-ambulance transfers and I exonerate the Hospital and its staff from any deterioration in my condition that results from this refusal     X___________________________________________    DATE  10/02/18  TIME________  Signature of patient or legally responsible individual signing on patient behalf           RELATIONSHIP TO PATIENT_________________________          Provider Certification    NAME Anita Davenport                                         1990                              MRN [id number]    A medical screening exam was performed on the above named patient  Based on the examination:    Condition Necessitating Transfer The primary encounter diagnosis was Medication management  A diagnosis of Psychosis ([facility] [address] ) was also pertinent to this visit  Patient Condition:      Reason for Transfer: Level of Care needed not available at this facility    Transfer Requirements: Facility [facility] 3[address] [place]   · Space available and qualified personnel available for treatment as acknowledged by Lucita Guardian ([address] [place] [address]) [phone]  · Agreed to accept transfer and to provide appropriate medical treatment as acknowledged by       DR DHAVAL MARINELLI  · Appropriate medical records of the examination and treatment of the patient are provided at the time of transfer   [address] [facility], [address] _______  · Transfer will be performed by qualified personnel from [facility] [phone]  and appropriate transfer equipment as required, including the use of necessary and appropriate life support measures      Provider Certification: I have examined the patient and explained the following risks and benefits of being transferred/refusing transfer to the patient/family:         Based on these reasonable risks and benefits to the patient and/or the unborn child(loree), and based upon the information available at the time of the patients examination, I certify that the medical benefits reasonably to be expected from the provision of appropriate medical treatments at another medical facility outweigh the increasing risks, if any, to the individuals medical condition, and in the case of labor to the unborn child, from effecting the transfer      X____________________________________________ DATE 10/02/18        TIME_______      ORIGINAL - SEND TO MEDICAL RECORDS   COPY - SEND WITH PATIENT DURING TRANSFER

## 2018-10-02 NOTE — ED NOTES
CW EVS'd pt and per EVS pt is Fee For Service  Pt has no insurance and will need a network bed      [address] [place] [address]

## 2018-10-02 NOTE — ED NOTES
Patient is accepted at [facility]  Patient is accepted by Dr Whitney Younger  per [facility])    Transportation is arranged with [facility] Himanshu is scheduled for 10/03/2018 @ 0030   Patient may go to the floor at [facility] 54        Nurse report is to be called to [phone] prior to patient transfer      [address] [place] [address]

## 2018-10-02 NOTE — ED NOTES
Pt is a 34 y o  male who was brought to the ED with   Chief Complaint   Patient presents with   [facility] Psychiatric Evaluation     Pt was brought in by [employer]  Pt's family called police because the patient has reportedly been beating up on his family  Pt denies SI, HI, hallucinations  When the officers arrived to the patients house, the patient ran from them and had to be brought in in handcuffs  Pt cooperative on arrival    Pt brought to the ED via [employer] with complaints of increased paranoia, increased agitation, Per pt family pt has not taken his medication since his discharge from [facility], and that pt has been talking to the walls, and threating to family, Pt mother reports that today pt threaten to husrt his sisters child , pt sister called [employer] who called [facility], (Jenny Morocho [phone]) was present in the ED and came on site to start a investigation, Pt reports that he family are the police and he wants to be homeless, Pt presents with increased paranioa, delusional, pt is responding to internal stimuli,   Pt contiunes to be very animated when he talk rubbing his chest, poor eye contact, flight of ideas  Pt denies S/I,H/I,A/H,V/H  Intake Assessment completed, Safety risk Assessment completed  CW met with pt and discussed the process of a a [facility] admission, Titansan also informed pt that pt will has been considering 302 petition, Pt has agreed and signed a 201  CW discussed this case and pt plan with ED Physician who is in agreement with this plan    CW will start bed search, and complete Pre-Cert        [facility] Crisis Worker

## 2018-10-02 NOTE — ED NOTES
CW started bed search, CW called  CW to inquire about [facility] bed status,  CW informed me that pt clinical can be presented to [facility] for possible admission  201 has been faxed      [facility]

## 2018-10-02 NOTE — ED PROVIDER NOTES
History  Chief Complaint   Patient presents with    Psychiatric Evaluation     Pt was brought in by [employer]  Pt's family called police because the patient has reportedly been beating up on his family  Pt denies SI, HI, hallucinations  When the officers arrived to the patients house, the patient ran from them and had to be brought in in handcuffs  Pt cooperative on arrival        History provided by:  Patient, parent and medical records   used: Yes    Psychiatric Evaluation   Presenting symptoms: aggressive behavior, agitation, homicidal ideas and suicidal threats    Patient accompanied by:  Law enforcement  Degree of incapacity (severity):  Severe  Onset quality:  Unable to specify (Per mom this is been going on since he got out of the hospital)  Timing:  Constant  Progression:  Worsening  Chronicity:  Recurrent  Context: noncompliance    Context: not alcohol use and not drug abuse    Treatment compliance:  Untreated  Relieved by:  Nothing  Worsened by:  Family interactions  Ineffective treatments: Was recently admitted to [facility] and then discharged little bit over a week ago  Associated symptoms: irritability    Associated symptoms: no abdominal pain, no chest pain and no headaches    Risk factors: recent psychiatric admission      Last time the patient was in the emergency department needed 4 point restraints and chemical sedation as well  He was a 302 the last time he was here  Prior to Admission Medications   Prescriptions Last Dose Informant Patient Reported? Taking?    OLANZapine (ZyPREXA) 20 MG tablet   No No   Sig: Take 1 tablet (20 mg total) by mouth daily at bedtime   OLANZapine (ZyPREXA) 5 mg tablet   No No   Sig: Take 1 tablet (5 mg total) by mouth 2 (two) times a day At 9AM and 2PM   benztropine (COGENTIN) 1 mg tablet   No No   Sig: Take 1 tablet (1 mg total) by mouth 2 (two) times a day At 9AM and 6PM   lithium 600 MG capsule   No No   Sig: Take 1 capsule (600 mg total) by mouth 2 (two) times a day with meals With breakfast and dinner      Facility-Administered Medications: None       Past Medical History:   Diagnosis Date    Psychiatric disorder     Schizophrenia ([facility] [address] )        History reviewed  No pertinent surgical history  History reviewed  No pertinent family history  I have reviewed and agree with the history as documented  Social History   Substance Use Topics    Smoking status: Current Every Day Smoker     Packs/day: 0 25     Types: Cigarettes    Smokeless tobacco: Never Used    Alcohol use 4 2 - 6 0 oz/week     7 - 10 Cans of beer per week      Comment: Socially        Review of Systems   Constitutional: Positive for irritability  Negative for fever  Respiratory: Negative for cough, chest tightness and shortness of breath  Cardiovascular: Negative for chest pain  Gastrointestinal: Negative for abdominal pain, diarrhea, nausea and vomiting  Genitourinary: Negative for dysuria  Neurological: Negative for weakness and headaches  Psychiatric/Behavioral: Positive for agitation and homicidal ideas  Threat to harm himself and threat to harm the family   All other systems reviewed and are negative  Physical Exam  Physical Exam   Constitutional: He appears well-developed and well-nourished  He is cooperative  Non-toxic appearance  He does not have a sickly appearance  He does not appear ill  No distress  HENT:   Head: Normocephalic and atraumatic  Right Ear: Hearing normal  No drainage or swelling  Left Ear: Hearing normal  No drainage or swelling  Mouth/Throat: Uvula is midline, oropharynx is clear and moist and mucous membranes are normal  No oropharyngeal exudate  Eyes: Pupils are equal, round, and reactive to light  EOM and lids are normal  Right eye exhibits no discharge  Left eye exhibits no discharge  Right conjunctiva is injected  Left conjunctiva is injected  Neck: Trachea normal and normal range of motion  No JVD present  Cardiovascular: Normal rate, regular rhythm, normal heart sounds, intact distal pulses and normal pulses  Exam reveals no gallop and no friction rub  No murmur heard  Pulmonary/Chest: Effort normal and breath sounds normal  No stridor  No respiratory distress  He has no wheezes  He has no rales  Abdominal: Soft  Normal appearance  He exhibits no ascites and no mass  There is no hepatosplenomegaly  There is no tenderness  There is no rebound, no guarding and no CVA tenderness  Musculoskeletal: Normal range of motion  He exhibits no edema  Lymphadenopathy:        Right: No inguinal adenopathy present  Left: No inguinal adenopathy present  Neurological: He is alert  He has normal strength  No cranial nerve deficit or sensory deficit  He exhibits normal muscle tone  Gait normal  GCS eye subscore is 4  GCS verbal subscore is 5  GCS motor subscore is 6  Skin: Skin is warm, dry and intact  No rash noted  He is not diaphoretic  No pallor  Psychiatric: His speech is normal  His affect is labile  He expresses homicidal and suicidal ideation  He expresses homicidal plans  He expresses no suicidal plans  Currently the patient is lying in the bed  Nursing note and vitals reviewed        Vital Signs  ED Triage Vitals [10/02/18 1715]   Temperature Pulse Respirations Blood Pressure SpO2   98 7 °F (37 1 °C) (!) 136 18 (!) 162/101 99 %      Temp Source Heart Rate Source Patient Position - Orthostatic VS BP Location FiO2 (%)   Oral Monitor Sitting Right arm --      Pain Score       No Pain           Vitals:    10/02/18 1715   BP: (!) 162/101   Pulse: (!) 136   Patient Position - Orthostatic VS: Sitting       Visual Acuity      ED Medications  Medications   ziprasidone (GEODON) IM injection 10 mg (not administered)   LORazepam (ATIVAN) 2 mg/mL injection 1 mg (not administered)       Diagnostic Studies  Results Reviewed     None                 No orders to display              Procedures  Procedures Phone Contacts  ED Phone Contact    ED Course                               [facility]  CritCare Time    Disposition  Final diagnoses:   None     ED Disposition     None      Follow-up Information    None         Patient's Medications   Discharge Prescriptions    No medications on file     No discharge procedures on file      ED Provider  Electronically Signed by           Michale Jennings MD  10/06/18 1016

## 2018-10-03 PROBLEM — R74.01 TRANSAMINITIS: Status: ACTIVE | Noted: 2018-10-03

## 2018-10-03 LAB
ALBUMIN SERPL BCP-MCNC: 3.9 G/DL (ref 3–5.2)
ALP SERPL-CCNC: 105 U/L (ref 43–122)
ALT SERPL W P-5'-P-CCNC: 91 U/L (ref 9–52)
ANION GAP SERPL CALCULATED.3IONS-SCNC: 8 MMOL/L (ref 5–14)
AST SERPL W P-5'-P-CCNC: 47 U/L (ref 17–59)
BASOPHILS # BLD AUTO: 0.1 THOUSANDS/ΜL (ref 0–0.1)
BASOPHILS NFR BLD AUTO: 1 % (ref 0–1)
BILIRUB SERPL-MCNC: 0.5 MG/DL
BUN SERPL-MCNC: 12 MG/DL (ref 5–25)
CALCIUM SERPL-MCNC: 9 MG/DL (ref 8.4–10.2)
CHLORIDE SERPL-SCNC: 103 MMOL/L (ref 97–108)
CHOLEST SERPL-MCNC: 164 MG/DL
CO2 SERPL-SCNC: 29 MMOL/L (ref 22–30)
CREAT SERPL-MCNC: 0.77 MG/DL (ref 0.7–1.5)
EOSINOPHIL # BLD AUTO: 0.2 THOUSAND/ΜL (ref 0–0.4)
EOSINOPHIL NFR BLD AUTO: 2 % (ref 0–6)
ERYTHROCYTE [DISTWIDTH] IN BLOOD BY AUTOMATED COUNT: 13.1 %
GFR SERPL CREATININE-BSD FRML MDRD: 125 ML/MIN/1.73SQ M
GLUCOSE P FAST SERPL-MCNC: 88 MG/DL (ref 70–99)
GLUCOSE SERPL-MCNC: 88 MG/DL (ref 70–99)
HAV IGM SER QL: NORMAL
HBV CORE IGM SER QL: NORMAL
HBV SURFACE AG SER QL: NORMAL
HCT VFR BLD AUTO: 48.5 % (ref 41–53)
HCV AB SER QL: NORMAL
HDLC SERPL-MCNC: 30 MG/DL (ref 40–59)
HGB BLD-MCNC: 16.4 G/DL (ref 13.5–17.5)
LDLC SERPL CALC-MCNC: 116 MG/DL
LYMPHOCYTES # BLD AUTO: 2.2 THOUSANDS/ΜL (ref 0.5–4)
LYMPHOCYTES NFR BLD AUTO: 26 % (ref 20–50)
MAGNESIUM SERPL-MCNC: 2.5 MG/DL (ref 1.6–2.3)
MCH RBC QN AUTO: 31.6 PG (ref 26–34)
MCHC RBC AUTO-ENTMCNC: 33.9 G/DL (ref 31–36)
MCV RBC AUTO: 93 FL (ref 80–100)
MONOCYTES # BLD AUTO: 0.7 THOUSAND/ΜL (ref 0.2–0.9)
MONOCYTES NFR BLD AUTO: 9 % (ref 1–10)
NEUTROPHILS # BLD AUTO: 5.2 THOUSANDS/ΜL (ref 1.8–7.8)
NEUTS SEG NFR BLD AUTO: 62 % (ref 45–65)
NONHDLC SERPL-MCNC: 134 MG/DL
PHOSPHATE SERPL-MCNC: 4 MG/DL (ref 2.5–4.8)
PLATELET # BLD AUTO: 270 THOUSANDS/UL (ref 150–450)
PMV BLD AUTO: 8.1 FL (ref 8.9–12.7)
POTASSIUM SERPL-SCNC: 3.6 MMOL/L (ref 3.6–5)
PROT SERPL-MCNC: 7.3 G/DL (ref 5.9–8.4)
RBC # BLD AUTO: 5.19 MILLION/UL (ref 4.5–5.9)
SODIUM SERPL-SCNC: 140 MMOL/L (ref 137–147)
TRIGL SERPL-MCNC: 89 MG/DL
TSH SERPL DL<=0.05 MIU/L-ACNC: 1.8 UIU/ML (ref 0.47–4.68)
WBC # BLD AUTO: 8.4 THOUSAND/UL (ref 4.5–11)

## 2018-10-03 PROCEDURE — 85025 COMPLETE CBC W/AUTO DIFF WBC: CPT | Performed by: PHYSICIAN ASSISTANT

## 2018-10-03 PROCEDURE — 93005 ELECTROCARDIOGRAM TRACING: CPT

## 2018-10-03 PROCEDURE — 87389 HIV-1 AG W/HIV-1&-2 AB AG IA: CPT | Performed by: INTERNAL MEDICINE

## 2018-10-03 PROCEDURE — 80061 LIPID PANEL: CPT | Performed by: PHYSICIAN ASSISTANT

## 2018-10-03 PROCEDURE — 84100 ASSAY OF PHOSPHORUS: CPT | Performed by: INTERNAL MEDICINE

## 2018-10-03 PROCEDURE — 83735 ASSAY OF MAGNESIUM: CPT | Performed by: INTERNAL MEDICINE

## 2018-10-03 PROCEDURE — 99252 IP/OBS CONSLTJ NEW/EST SF 35: CPT | Performed by: INTERNAL MEDICINE

## 2018-10-03 PROCEDURE — 80053 COMPREHEN METABOLIC PANEL: CPT | Performed by: PHYSICIAN ASSISTANT

## 2018-10-03 PROCEDURE — 84443 ASSAY THYROID STIM HORMONE: CPT | Performed by: PHYSICIAN ASSISTANT

## 2018-10-03 PROCEDURE — 80074 ACUTE HEPATITIS PANEL: CPT | Performed by: INTERNAL MEDICINE

## 2018-10-03 RX ORDER — HALOPERIDOL 0.5 MG/1
0.5 TABLET ORAL 2 TIMES DAILY
Status: DISCONTINUED | OUTPATIENT
Start: 2018-10-03 | End: 2018-10-04

## 2018-10-03 RX ORDER — OLANZAPINE 10 MG/1
10 INJECTION, POWDER, LYOPHILIZED, FOR SOLUTION INTRAMUSCULAR EVERY 4 HOURS PRN
Status: DISCONTINUED | OUTPATIENT
Start: 2018-10-03 | End: 2018-10-08 | Stop reason: HOSPADM

## 2018-10-03 RX ORDER — ACETAMINOPHEN 325 MG/1
650 TABLET ORAL EVERY 6 HOURS PRN
Status: DISCONTINUED | OUTPATIENT
Start: 2018-10-03 | End: 2018-10-03

## 2018-10-03 RX ORDER — LITHIUM CARBONATE 300 MG/1
300 CAPSULE ORAL 2 TIMES DAILY WITH MEALS
Status: DISCONTINUED | OUTPATIENT
Start: 2018-10-03 | End: 2018-10-04

## 2018-10-03 RX ORDER — HYDROXYZINE 50 MG/1
50 TABLET, FILM COATED ORAL EVERY 4 HOURS PRN
Status: DISCONTINUED | OUTPATIENT
Start: 2018-10-03 | End: 2018-10-08 | Stop reason: HOSPADM

## 2018-10-03 RX ORDER — TRAZODONE HYDROCHLORIDE 100 MG/1
100 TABLET ORAL
Status: DISCONTINUED | OUTPATIENT
Start: 2018-10-03 | End: 2018-10-08 | Stop reason: HOSPADM

## 2018-10-03 RX ORDER — OLANZAPINE 10 MG/1
10 TABLET ORAL EVERY 4 HOURS PRN
Status: DISCONTINUED | OUTPATIENT
Start: 2018-10-03 | End: 2018-10-08 | Stop reason: HOSPADM

## 2018-10-03 RX ADMIN — HALOPERIDOL 0.5 MG: 0.5 TABLET ORAL at 09:27

## 2018-10-03 RX ADMIN — LITHIUM CARBONATE 300 MG: 300 CAPSULE, GELATIN COATED ORAL at 16:52

## 2018-10-03 RX ADMIN — HALOPERIDOL 0.5 MG: 0.5 TABLET ORAL at 17:11

## 2018-10-03 RX ADMIN — LITHIUM CARBONATE 300 MG: 300 CAPSULE, GELATIN COATED ORAL at 09:27

## 2018-10-03 NOTE — PLAN OF CARE
Problem: Alteration in Thoughts and Perception  Goal: Treatment Goal: Gain control of psychotic behaviors/thinking, reduce/eliminate presenting symptoms and demonstrate improved reality functioning upon discharge  Outcome: Not Progressing    Goal: Refrain from acting on delusional thinking/internal stimuli  Interventions:  - Monitor patient closely, per order   - Utilize least restrictive measures   - Set reasonable limits, give positive feedback for acceptable   - Administer medications as ordered and monitor of potential side effects   Outcome: Progressing    Goal: Agree to be compliant with medication regime, as prescribed and report medication side effects  Interventions:  - Offer appropriate PRN medication and supervise ingestion; conduct aims, as needed    Outcome: Progressing    Goal: Attend and participate in unit activities, including therapeutic, recreational, and educational groups  Interventions:  - Provide therapeutic and educational activities daily, encourage attendance and participation, and document same in the medical record    Outcome: Progressing    Goal: Complete daily ADLs, including personal hygiene independently, as able  Interventions:  - Observe, teach, and assist patient with ADLS  - Monitor and promote a balance of rest/activity, with adequate nutrition and elimination    Outcome: Progressing      Problem: Risk for Self Injury/Neglect  Goal: Refrain from harming self  Interventions:  - Monitor patient closely, per order  - Develop a trusting relationship  - Supervise medication ingestion, monitor effects and side effects    Outcome: Progressing      Problem: Depression  Goal: Refrain from isolation  Interventions:  - Develop a trusting relationship   - Encourage socialization    Outcome: Progressing      Problem: Anxiety  Goal: Anxiety is at manageable level  Interventions:  - Assess and monitor patient's anxiety level     - Monitor for signs and symptoms of anxiety both physical and emotional (heart palpitations, chest pain, shortness of breath, headaches, nausea, feeling jumpy, restlessness, irritable, apprehensive)  - Collaborate with interdisciplinary team and initiate plan and interventions as ordered  - [place] patient to unit/surroundings  - Explain treatment plan  - Encourage participation in care  - Encourage verbalization of concerns/fears  - Identify coping mechanisms  - Assist in developing anxiety-reducing skills  - Administer/offer alternative therapies  - Limit or eliminate stimulants   Outcome: Progressing      Comments: Pt a&o x 4  Pt denies all depression, anxiety, SI's, HI's, AH's, VH's  Pt does appear to be internally preoccupied  Pt compliant with medications, although states he does not need them, but is taking them to be able to be discharged  Patient informed writer that the medication cannot possibly make the writer feel better than he already does  Pt loud and social with select peers  Pt with disheveled appearance  Pt med/meal compliant  Will continue to monitor and provide therapeutic support

## 2018-10-03 NOTE — DISCHARGE INSTR - OTHER ORDERS
If you are experiencing a mental health emergency, you may call the [address] [place] [address] 24 hours a day, 7 days per week at [phone]  In [facility], call [phone]  When you need someone to listen, the [facility] is available for 16 hours a day, 7 days a week, from the time of 7-10am and 2pm-2am   It is not available from the hours of 2am-6am and 10am-2pm  A representative can be reached at [phone]

## 2018-10-03 NOTE — EMTALA/ACUTE CARE TRANSFER
[address]  [address] [place] [address]  Dept: [phone]      EMTALA TRANSFER CONSENT    NAME Valeria Hoang                                         1990                              MRN [id number]    I have been informed of my rights regarding examination, treatment, and transfer   by Dr Cj Villagomez DO    Benefits:      Risks:        Consent for Transfer:  I acknowledge that my medical condition has been evaluated and explained to me by the emergency department physician or other qualified medical person and/or my attending physician, who has recommended that I be transferred to the service of  Accepting Physician: DR Kenney Tijerina at [address] Name, [address] : [address]  The above potential benefits of such transfer, the potential risks associated with such transfer, and the probable risks of not being transferred have been explained to me, and I fully understand them  The doctor has explained that, in my case, the benefits of transfer outweigh the risks  I agree to be transferred  I authorize the performance of emergency medical procedures and treatments upon me in both transit and upon arrival at the receiving facility  Additionally, I authorize the release of any and all medical records to the receiving facility and request they be transported with me, if possible  I understand that the safest mode of transportation during a medical emergency is an ambulance and that the Hospital advocates the use of this mode of transport  Risks of traveling to the receiving facility by car, including absence of medical control, life sustaining equipment, such as oxygen, and medical personnel has been explained to me and I fully understand them  (BRIAN CORRECT BOX BELOW)  [  ]  I consent to the stated transfer and to be transported by ambulance/helicopter    [  ]  I consent to the stated transfer, but refuse transportation by ambulance and accept full responsibility for my transportation by car  I understand the risks of non-ambulance transfers and I exonerate the Hospital and its staff from any deterioration in my condition that results from this refusal     X___________________________________________    DATE  10/02/18  TIME________  Signature of patient or legally responsible individual signing on patient behalf           RELATIONSHIP TO PATIENT_________________________          Provider Certification    NAME Shanti Cunha                                         1990                              MRN [id number]    A medical screening exam was performed on the above named patient  Based on the examination:    Condition Necessitating Transfer The primary encounter diagnosis was Medication management  A diagnosis of Psychosis ([facility] [address] ) was also pertinent to this visit  Patient Condition:      Reason for Transfer:      Transfer Requirements: Facility [address] [place]   · Space available and qualified personnel available for treatment as acknowledged by Toy Gonzalez ([facility]) [phone]  · Agreed to accept transfer and to provide appropriate medical treatment as acknowledged by       DR DHAVAL MARINELLI  · Appropriate medical records of the examination and treatment of the patient are provided at the time of transfer   [address] [facility], [address] _______  · Transfer will be performed by qualified personnel from [facility] [phone]  and appropriate transfer equipment as required, including the use of necessary and appropriate life support measures      Provider Certification: I have examined the patient and explained the following risks and benefits of being transferred/refusing transfer to the patient/family:         Based on these reasonable risks and benefits to the patient and/or the unborn child(loree), and based upon the information available at the time of the patients examination, I certify that the medical benefits reasonably to be expected from the provision of appropriate medical treatments at another medical facility outweigh the increasing risks, if any, to the individuals medical condition, and in the case of labor to the unborn child, from effecting the transfer      X____________________________________________ DATE 10/02/18        TIME_______      ORIGINAL - SEND TO MEDICAL RECORDS   COPY - SEND WITH PATIENT DURING TRANSFER

## 2018-10-03 NOTE — CONSULTS
Consult- Tiffanie Holland 1990, 34 y o  male MRN: [id number]    Unit/Bed#: [facility] 381-02 Encounter: [id number]    Primary Care Provider: No primary care provider on file  Date and time admitted to hospital: 10/2/2018 11:43 PM      Consults    Transaminitis   Assessment & Plan    · Check an acute hepatitis profile  · Follow the liver function tests  · Avoid all hepatotoxic agents including a tylenol/acetaminophen     * Paranoid schizophrenia ([facility] )   Assessment & Plan    · Inpatient psychiatric treatment per Psychiatry             VTE Prophylaxis: RX contraindicated due to: the patient is low risk for VTE  / reason for no mechanical VTE prophylaxis the patient is low risk for VTE     Recommendations for Discharge:  · Outpatient follow-up with PCP for the transaminitis  · Outpatient liver ultrasound to evaluate the transaminitis    Counseling / Coordination of Care Time: 20 minutes  Greater than 50% of total time spent on patient counseling and coordination of care  Collaboration of Care: Were Recommendations Directly Discussed with Primary Treatment Team? - No     History of Present Illness:    Shalom Corado is a 34 y o  male who is originally admitted to the Psychiatry service for an inpatient psychiatric hospitalization  The patient was recently discharged from the inpatient psychiatric unit at [address] [place] [address],[address] on 09/27/2018  The patient was brought to the emergency department on 61/54/4503 by the police department secondary to the patient exhibiting violent behavior toward his family  He was also expressing suicidal ideations and agitation  Nothing seemed to improve his symptoms  His symptoms were constant in nature  Review of Systems:    Review of Systems:  Per HPI, all other systems have been reviewed and were negative      Past Medical and Surgical History:     Past Medical History:   Diagnosis Date    Psychiatric disorder     Schizophrenia ([facility] )        No past surgical history on file  Meds/Allergies:    all medications and allergies reviewed    Allergies: No Known Allergies    Social History:     Marital Status: Single    Substance Use History:   History   Alcohol Use    4 2 - 6 0 oz/week    7 - 10 Cans of beer per week     Comment: Socially     History   Smoking Status    Current Every Day Smoker    Packs/day: 0 25    Types: Cigarettes   Smokeless Tobacco    Never Used     History   Drug Use No       Family History:    non-contributory    Physical Exam:     Vitals:   Blood Pressure: 129/82 (10/02/18 2345)  Pulse: 100 (10/02/18 2345)  Temperature: 98 2 °F (36 8 °C) (10/02/18 2345)  Temp Source: Temporal (10/02/18 2345)  Respirations: 20 (10/02/18 2345)  Height: 5' 11" (180 3 cm) (10/02/18 2345)  Weight - Scale: 72 6 kg (160 lb 0 3 oz) (10/02/18 2345)  SpO2: 98 % (10/02/18 2345)    Physical Exam  General:  NAD, awake, alert, follows commands  HEENT:  NC/AT, mucous membranes dry  Neck:  Supple, No JVP elevation  CV:  + S1, + S2, Tachycardic, Regular rhythm  Pulm:  Lung fields are CTA bilaterally  Abd:  Soft, Non-tender, Non-distended  Ext:  No clubbing/cyanosis/edema  Skin:  No rashes      Additional Data:     Lab Results: I have personally reviewed pertinent reports  Results from last 7 days  Lab Units 09/27/18  0700   SODIUM mmol/L 140   POTASSIUM mmol/L 3 8   CHLORIDE mmol/L 102   CO2 mmol/L 32   BUN mg/dL 9   CREATININE mg/dL 0 98   ANION GAP mmol/L 6   CALCIUM mg/dL 9 7   ALBUMIN g/dL 3 7   TOTAL BILIRUBIN mg/dL 0 40   ALK PHOS U/L 139*   ALT U/L 265*   AST U/L 96*             Lab Results   Component Value Date/Time    HGBA1C 5 3 09/11/2018 06:24 AM               Imaging: I have personally reviewed pertinent reports  No orders to display         ** Please Note: This note has been constructed using a voice recognition system   **

## 2018-10-03 NOTE — ED NOTES
Pt offered food and drink   Pt states not hungry at this time but will notify staff when wanting something     Cinthya Alvarez RN  10/02/18 2006

## 2018-10-03 NOTE — NURSING NOTE
Behaviors controlled and appropriate on admission  Non complaint  Reports no issues related to sleep  Patient in bed at this time and appears to be sleeping  Eyes closed and chest movements noted  Offered no complaints  Patient did not wake up to request any PRN medications during the night

## 2018-10-03 NOTE — ED NOTES
Assumed care of patient at this time  Resting comfortably on stretcher, respirations even and unlabored, no apparent distress        Osvaldo Giang RN  10/02/18 7099
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
## 2018-10-03 NOTE — ASSESSMENT & PLAN NOTE
· Check an acute hepatitis profile  · Follow the liver function tests  · Avoid all hepatotoxic agents including a tylenol/acetaminophen

## 2018-10-03 NOTE — ED NOTES
Pt personal belongings and paperwork provided to EMS crew and pt prepared for transport to [facility] Rhett Stern RN  10/02/18 4249

## 2018-10-03 NOTE — NURSING NOTE
Patient reported he does not know how he  ended up here on our unit and does not have any insight as to what his diagnosis is  He did not  any scripts that were given to him at his last discharge on 9/27/18 from [facility]  He does not believe he needs any medication and refused all medications that the admitting Rn offered before he went to bed  Patient not always making sense  Poor historian  Patient bizarre at times and responding to internal stimuli but denies that when asked  He reported he only talks to himself because he has no friends  Cooperated with the admission process but was too paranoid to allow RN to check his body for scars, incisions and tattoos  Tattoos visible when drawing bloodwork from his arm  Patient reports he has no desire to stay in the hospital and does not believe he needs to be here

## 2018-10-03 NOTE — SOCIAL WORK
Patient is a 34year old  male  Patient is oriented x1  Patient's thought process is tangential   Patient's speech is soft  Patient's affect is flat and mood is irritable  Patient's appearance is disheveled  Patient maintained minimal eye contact throughout evaluation  Patient appeared to be responding to internal stimuli throughout assessment  Patient was smiling inappropriately at times  Patient unable to disclose what brought him to the hospital   Patient guarded regarding incident that occurred prior to admission  Patient stating, "I'm not sick I don't need to be here, when am I being discharged"  Worker informed him that a discharge date has not been determined at this time  Patient unable to answer assessment questions appropriately  Worker asked about his housing situation he stated, "I don't have any family"  Per ER report patient cannot return to his sister's home where he was residing at  Patient reports that he would like to return to [place]  Patient resided in [place] prior to moving to [place] in early 2017  Patient has been hospitalized several times for inpatient psychiatric issues in [place]  Patient was most recently discharged from [facility] on 9/27/18  Patient has no current outpatient psychiatric provider  Patient unable to answer abuse questions but per notes from patient's admission at [facility] no abuse was reported by the family  Patient was diagnosed with Schizophrenia at age 26  Patient has a long hx of medication noncompliance  When patient was discharged from [address] [place] [address] he never filled his medications  Patient did report that he has no intention of taking medications since "nothing is wrong with me"  Patient's UDS was negative upon admission but he does have a hx of smoking marijuana  Patient refused to sign any TIA's for his family  Patient would like to return to [place] upon discharge

## 2018-10-03 NOTE — PROGRESS NOTES
Pt attended discharge group  Pt grandiose and needed redirection to focus on group topic  Pt did take laterials that were bilingual in [place] and [address] [place] [address] on diagnosis  Reviewed signs and symptoms and discussed  discharge needs and expectations from program    Peers offered hope and inspiration discussing and having insight into MH needs  Continue to provide therapeutic group support

## 2018-10-04 LAB
ATRIAL RATE: 98 BPM
HIV 1+2 AB+HIV1 P24 AG SERPL QL IA: NORMAL
P AXIS: 57 DEGREES
PR INTERVAL: 126 MS
QRS AXIS: 51 DEGREES
QRSD INTERVAL: 84 MS
QT INTERVAL: 338 MS
QTC INTERVAL: 431 MS
T WAVE AXIS: 22 DEGREES
VENTRICULAR RATE: 98 BPM

## 2018-10-04 PROCEDURE — 93010 ELECTROCARDIOGRAM REPORT: CPT | Performed by: INTERNAL MEDICINE

## 2018-10-04 RX ORDER — HALOPERIDOL 0.5 MG/1
0.5 TABLET ORAL 3 TIMES DAILY
Status: DISCONTINUED | OUTPATIENT
Start: 2018-10-04 | End: 2018-10-05

## 2018-10-04 RX ORDER — LITHIUM CARBONATE 300 MG/1
300 CAPSULE ORAL
Status: DISCONTINUED | OUTPATIENT
Start: 2018-10-04 | End: 2018-10-08 | Stop reason: HOSPADM

## 2018-10-04 RX ADMIN — LITHIUM CARBONATE 300 MG: 300 CAPSULE, GELATIN COATED ORAL at 06:34

## 2018-10-04 RX ADMIN — HALOPERIDOL 0.5 MG: 0.5 TABLET ORAL at 21:40

## 2018-10-04 RX ADMIN — HALOPERIDOL 0.5 MG: 0.5 TABLET ORAL at 16:46

## 2018-10-04 RX ADMIN — LITHIUM CARBONATE 300 MG: 300 CAPSULE, GELATIN COATED ORAL at 16:46

## 2018-10-04 RX ADMIN — HALOPERIDOL 0.5 MG: 0.5 TABLET ORAL at 08:51

## 2018-10-04 RX ADMIN — LITHIUM CARBONATE 300 MG: 300 CAPSULE, GELATIN COATED ORAL at 12:13
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
## 2018-10-04 NOTE — PROGRESS NOTES
Pt appears to be sleeping at this time, chest rising and pt moving in bed  No signs or symptoms of distress or discomfort  Pt remains safe and will continue to monitor

## 2018-10-04 NOTE — PROGRESS NOTES
Psychiatry Progress Note    Subjective: Interval History     Patient visible on the unit  Patient actively responding to internal stimuli  Patient internally preoccupied and distracted  Patient with poor hygiene  Patient guarded and unwilling to participate in assessment this morning  Patient stating that there is nothing he needs to speak with Pat Walker about as he is doing fine  Patient has been compliant with his psychotropic medications however has expressed to his  and nursing staff that he is only taking his medications to be able to be discharged  Patient continues to lack any insight into his behaviors and his symptoms  No adverse effects noted to initiation of lithium and Haldol at this time        Current medications:    Current Facility-Administered Medications:     benztropine (COGENTIN) injection 1 mg, 1 mg, Intramuscular, Q6H PRN, Jc Odor, PA-C    benztropine (COGENTIN) tablet 1 mg, 1 mg, Oral, Q6H PRN, Hosea Odor, PA-FRANKIE    haloperidol (HALDOL) tablet 0 5 mg, 0 5 mg, Oral, TID, Mansoor Gann PA-C    hydrOXYzine HCL (ATARAX) tablet 50 mg, 50 mg, Oral, Q4H PRN, Mansoor Gann PA-C    lithium carbonate capsule 300 mg, 300 mg, Oral, TID With Meals, Mansoor Gann PA-C    LORazepam (ATIVAN) 2 mg/mL injection 1 mg, 1 mg, Intramuscular, Q4H PRN, Jc Odor, PA-FRANKIE    LORazepam (ATIVAN) tablet 1 mg, 1 mg, Oral, Q4H PRN, Jc Odor, PA-C    magnesium hydroxide (MILK OF MAGNESIA) 400 mg/5 mL oral suspension 20 mL, 20 mL, Oral, Daily PRN, Jc Odor, PA-FRANKIE    nicotine polacrilex (NICORETTE) gum 2 mg, 2 mg, Oral, Q2H PRN, Jc Odor, PA-FRANKIE    OLANZapine (ZyPREXA) IM injection 10 mg, 10 mg, Intramuscular, Q4H PRN, Mansoor Gann PA-C    OLANZapine (ZyPREXA) tablet 10 mg, 10 mg, Oral, Q4H PRN, Mansoor Gann PA-C    traZODone (DESYREL) tablet 100 mg, 100 mg, Oral, HS PRN, Mansoor Gann PA-C      Objective:     Vital Signs:  Vitals:    10/02/18 2345 10/03/18 0739 10/04/18 0744 10/04/18 0745   BP: 129/82 137/88 128/85 127/81   BP Location: Right arm Left arm Left arm Left arm   Pulse: 100 88 89 87   Resp: 20 22 16    Temp: 98 2 °F (36 8 °C) 97 6 °F (36 4 °C) 97 6 °F (36 4 °C)    TempSrc: Temporal Temporal Temporal    SpO2: 98% 98%     Weight: 72 6 kg (160 lb 0 3 oz)      Height: 5' 11" (1 803 m)            Appearance:  age appropriate, casually dressed and disheveled   Behavior:  normal   Speech:  normal volume   Mood:  irritable   Affect:  labile   Thought Process:  disorganized   Thought Content:  delusions  persecutory   Perceptual Disturbances: Auditory hallucinations without commands   Risk Potential: Potential for Aggression No   Sensorium:  person, place and time   Cognition:  intact   Consciousness:  alert and awake    Attention: attention span and concentration were age appropriate   Intellect: average   Insight:  limited   Judgment: limited      Motor Activity: no abnormal movements           Recent Labs:  Results Reviewed     None          I/O Past 24 hours:  No intake/output data recorded  No intake/output data recorded  Assessment / Plan:     Paranoid schizophrenia ([facility] )    Recommended Treatment:      Medication changes:  1) increase lithium 300 mg 3 times daily  Increase Haldol 0 5 mg t i d   Continue to monitor patient on assault and suicide precautions  Non-pharmacological treatments  1) Continue with group therapy, milieu therapy and occupational therapy  Safety  1) Safety/communication plan established targeting dynamic risk factors above  2) Risks, benefits, and possible side effects of medications explained to patient and patient verbalizes understanding  Counseling / Coordination of Care    Total floor / unit time spent today 20 minutes  Greater than 50% of total time was spent with the patient and / or family counseling and / or coordination of care   A description of the counseling / coordination of care  Patient's Rights, confidentiality and exceptions to confidentiality, use of automated medical record, [facility] staff access to medical record, and consent to treatment reviewed      López Looney PA-C

## 2018-10-04 NOTE — PLAN OF CARE
Problem: Alteration in Thoughts and Perception  Goal: Treatment Goal: Gain control of psychotic behaviors/thinking, reduce/eliminate presenting symptoms and demonstrate improved reality functioning upon discharge  Outcome: Progressing    Goal: Refrain from acting on delusional thinking/internal stimuli  Interventions:  - Monitor patient closely, per order   - Utilize least restrictive measures   - Set reasonable limits, give positive feedback for acceptable   - Administer medications as ordered and monitor of potential side effects   Outcome: Progressing    Goal: Agree to be compliant with medication regime, as prescribed and report medication side effects  Interventions:  - Offer appropriate PRN medication and supervise ingestion; conduct aims, as needed    Outcome: Progressing    Goal: Attend and participate in unit activities, including therapeutic, recreational, and educational groups  Interventions:  - Provide therapeutic and educational activities daily, encourage attendance and participation, and document same in the medical record    Outcome: Progressing    Goal: Complete daily ADLs, including personal hygiene independently, as able  Interventions:  - Observe, teach, and assist patient with ADLS  - Monitor and promote a balance of rest/activity, with adequate nutrition and elimination    Outcome: Progressing      Problem: Risk for Self Injury/Neglect  Goal: Refrain from harming self  Interventions:  - Monitor patient closely, per order  - Develop a trusting relationship  - Supervise medication ingestion, monitor effects and side effects    Outcome: Progressing      Problem: Depression  Goal: Refrain from isolation  Interventions:  - Develop a trusting relationship   - Encourage socialization    Outcome: Progressing      Problem: Anxiety  Goal: Anxiety is at manageable level  Interventions:  - Assess and monitor patient's anxiety level     - Monitor for signs and symptoms of anxiety both physical and emotional (heart palpitations, chest pain, shortness of breath, headaches, nausea, feeling jumpy, restlessness, irritable, apprehensive)  - Collaborate with interdisciplinary team and initiate plan and interventions as ordered    - [place] patient to unit/surroundings  - Explain treatment plan  - Encourage participation in care  - Encourage verbalization of concerns/fears  - Identify coping mechanisms  - Assist in developing anxiety-reducing skills  - Administer/offer alternative therapies  - Limit or eliminate stimulants   Outcome: Progressing

## 2018-10-04 NOTE — PROGRESS NOTES
Patient is adjusting to unit he is extremely anxious at this point He is med and meal compliant Some socialization with a few peers  Continues to be paranoid at this time  Will continue to offer support and medication whenever needed

## 2018-10-05 RX ORDER — HALOPERIDOL 0.5 MG/1
1 TABLET ORAL 3 TIMES DAILY
Status: DISCONTINUED | OUTPATIENT
Start: 2018-10-05 | End: 2018-10-06

## 2018-10-05 RX ADMIN — LITHIUM CARBONATE 300 MG: 300 CAPSULE, GELATIN COATED ORAL at 08:39

## 2018-10-05 RX ADMIN — HALOPERIDOL 1 MG: 0.5 TABLET ORAL at 20:49

## 2018-10-05 RX ADMIN — NICOTINE POLACRILEX 2 MG: 2 GUM, CHEWING ORAL at 08:40

## 2018-10-05 RX ADMIN — HALOPERIDOL 1 MG: 0.5 TABLET ORAL at 08:39

## 2018-10-05 RX ADMIN — HALOPERIDOL 1 MG: 0.5 TABLET ORAL at 17:36

## 2018-10-05 RX ADMIN — LITHIUM CARBONATE 300 MG: 300 CAPSULE, GELATIN COATED ORAL at 17:37

## 2018-10-05 RX ADMIN — LITHIUM CARBONATE 300 MG: 300 CAPSULE, GELATIN COATED ORAL at 12:27

## 2018-10-05 NOTE — PROGRESS NOTES
Psychiatry Progress Note    Subjective: Interval History     Patient rescinded his 72 hour notice with author this morning  Patient continues to lack insight into his admission  Patient reports that he was fine at the time of admission and that he does not need any psychotropic medications  Patient has been maintaining compliance with his medications during his hospitalization as he does want to be discharged  Patient reports that he is tolerating medications with no adverse effects  Patient still remains internally preoccupied and distracted  Patient continues to be witnessed responding to internal stimuli  Patient however continues to deny all psychiatric symptoms      Current medications:    Current Facility-Administered Medications:     benztropine (COGENTIN) injection 1 mg, 1 mg, Intramuscular, Q6H PRN, Geneva Silvervanessaith, PA-C    benztropine (COGENTIN) tablet 1 mg, 1 mg, Oral, Q6H PRN, Geneva Lau PA-C    haloperidol (HALDOL) tablet 1 mg, 1 mg, Oral, TID, Thomas Mclean PA-C    hydrOXYzine HCL (ATARAX) tablet 50 mg, 50 mg, Oral, Q4H PRN, MOISES Walden-C    lithium carbonate capsule 300 mg, 300 mg, Oral, TID With Meals, MOISES Walden-C, 300 mg at 10/04/18 1646    LORazepam (ATIVAN) 2 mg/mL injection 1 mg, 1 mg, Intramuscular, Q4H PRN, Geneva Lau PA-C    LORazepam (ATIVAN) tablet 1 mg, 1 mg, Oral, Q4H PRN, Geneva Lau, PA-C    magnesium hydroxide (MILK OF MAGNESIA) 400 mg/5 mL oral suspension 20 mL, 20 mL, Oral, Daily PRN, Geneva Lau PA-C    nicotine polacrilex (NICORETTE) gum 2 mg, 2 mg, Oral, Q2H PRN, Geneva Lau, PA-C    OLANZapine (ZyPREXA) IM injection 10 mg, 10 mg, Intramuscular, Q4H PRN, Thomas Mclean PA-C    OLANZapine (ZyPREXA) tablet 10 mg, 10 mg, Oral, Q4H PRN, Thomas Mclean PA-C    traZODone (DESYREL) tablet 100 mg, 100 mg, Oral, HS PRN, MOISES Walden-C      Objective:     Vital Signs:  Vitals:    10/02/18 2345 10/03/18 0739 10/04/18 0744 10/04/18 0745   BP: 129/82 137/88 128/85 127/81   BP Location: Right arm Left arm Left arm Left arm   Pulse: 100 88 89 87   Resp: 20 22 16    Temp: 98 2 °F (36 8 °C) 97 6 °F (36 4 °C) 97 6 °F (36 4 °C)    TempSrc: Temporal Temporal Temporal    SpO2: 98% 98%     Weight: 72 6 kg (160 lb 0 3 oz)      Height: 5' 11" (1 803 m)            Appearance:  age appropriate, casually dressed and disheveled   Behavior:  normal   Speech:  normal volume   Mood:  irritable   Affect:  labile   Thought Process:  disorganized   Thought Content:  delusions  persecutory   Perceptual Disturbances: Auditory hallucinations without commands   Risk Potential: Potential for Aggression No   Sensorium:  person, place and time   Cognition:  intact   Consciousness:  alert and awake    Attention: attention span and concentration were age appropriate   Intellect: average   Insight:  limited   Judgment: limited      Motor Activity: no abnormal movements           Recent Labs:  Results Reviewed     None          I/O Past 24 hours:  No intake/output data recorded  No intake/output data recorded  Assessment / Plan:     [facility]    Recommended Treatment:      Medication changes:  1) increase Haldol 1 mg p o  t i d   Lithium level pending for Sunday  Non-pharmacological treatments  1) Continue with group therapy, milieu therapy and occupational therapy  Safety  1) Safety/communication plan established targeting dynamic risk factors above  2) Risks, benefits, and possible side effects of medications explained to patient and patient verbalizes understanding  Counseling / Coordination of Care    Total floor / unit time spent today 20 minutes  Greater than 50% of total time was spent with the patient and / or family counseling and / or coordination of care  A description of the counseling / coordination of care       Patient's Rights, confidentiality and exceptions to confidentiality, use of automated medical record, [address] [place] [address] staff access to medical record, and consent to treatment reviewed      Charlie Owens PA-C

## 2018-10-05 NOTE — PROGRESS NOTES
10/05/18 1100   Activity/Group Checklist   Group Other (Comment)   Attendance Attended   Attendance Duration (min) 46-60   Interactions Unable to interact   Affect/Mood Incongruent   Goals Achieved Other (Comment)   Topic: Addiction  D: Patient would interrupt group speaking in Burundian to another peer when asked if he wanted to talk with the group he refused  A: Patient appears to be experiencing internal stimuli and acts impulsively  P: To assess whether group is appropriate for him

## 2018-10-05 NOTE — NURSING NOTE
Received patient at 1900  Patient was cooperative and visible on unit during the evening  Patient appeared to be responding to internal stimuli at times  Patient denied anxiety, depression, SI/HI, and pain  Patient was complaint with medication  Patient has appeared to be sleeping for past few hours  Will continue to monitor closely on assault and suicide precautions

## 2018-10-05 NOTE — NURSING NOTE
Patient has appeared to sleep through the night  Will continue to monitor closely on assault and suicide precautions

## 2018-10-05 NOTE — PLAN OF CARE
Problem: DISCHARGE PLANNING  Goal: Discharge to home or other facility with appropriate resources  INTERVENTIONS:  - Identify barriers to discharge w/patient and caregiver  - Arrange for needed discharge resources and transportation as appropriate  - Identify discharge learning needs (meds, wound care, etc )  - Arrange for interpretive services to assist at discharge as needed  -To be determined where patient will reside upon discharge  -Patient will be referred to [facility] for outpatient psychiatric funding until MA becomes active  - Refer to Case Management Department for coordinating discharge planning if the patient needs post-hospital services based on physician/advanced practitioner order or complex needs related to functional status, cognitive ability, or social support system   Outcome: Progressing  Patient continues to respond to internal stimuli  Patient signed 72 hour notice that will  on 10/8

## 2018-10-05 NOTE — PLAN OF CARE
Problem: Alteration in Thoughts and Perception  Goal: Treatment Goal: Gain control of psychotic behaviors/thinking, reduce/eliminate presenting symptoms and demonstrate improved reality functioning upon discharge  Outcome: Progressing    Goal: Refrain from acting on delusional thinking/internal stimuli  Interventions:  - Monitor patient closely, per order   - Utilize least restrictive measures   - Set reasonable limits, give positive feedback for acceptable   - Administer medications as ordered and monitor of potential side effects   Outcome: Progressing    Goal: Agree to be compliant with medication regime, as prescribed and report medication side effects  Interventions:  - Offer appropriate PRN medication and supervise ingestion; conduct aims, as needed    Outcome: Progressing    Goal: Attend and participate in unit activities, including therapeutic, recreational, and educational groups  Interventions:  - Provide therapeutic and educational activities daily, encourage attendance and participation, and document same in the medical record    Outcome: Progressing    Goal: Complete daily ADLs, including personal hygiene independently, as able  Interventions:  - Observe, teach, and assist patient with ADLS  - Monitor and promote a balance of rest/activity, with adequate nutrition and elimination    Outcome: Progressing      Problem: Risk for Self Injury/Neglect  Goal: Refrain from harming self  Interventions:  - Monitor patient closely, per order  - Develop a trusting relationship  - Supervise medication ingestion, monitor effects and side effects    Outcome: Progressing      Problem: Depression  Goal: Refrain from isolation  Interventions:  - Develop a trusting relationship   - Encourage socialization    Outcome: Progressing      Comments: Pt is visible on the unit interacting with other peers  Pt is med/meal compliant  Pt is observed being very loud and obnoxious when with other peers  Will continue to monitor

## 2018-10-06 RX ORDER — HALOPERIDOL 0.5 MG/1
2 TABLET ORAL 3 TIMES DAILY
Status: DISCONTINUED | OUTPATIENT
Start: 2018-10-06 | End: 2018-10-08 | Stop reason: HOSPADM

## 2018-10-06 RX ADMIN — HALOPERIDOL 1 MG: 0.5 TABLET ORAL at 15:54

## 2018-10-06 RX ADMIN — HALOPERIDOL 1 MG: 0.5 TABLET ORAL at 08:22

## 2018-10-06 RX ADMIN — HALOPERIDOL 2 MG: 0.5 TABLET ORAL at 21:09

## 2018-10-06 RX ADMIN — LITHIUM CARBONATE 300 MG: 300 CAPSULE, GELATIN COATED ORAL at 11:14

## 2018-10-06 RX ADMIN — LITHIUM CARBONATE 300 MG: 300 CAPSULE, GELATIN COATED ORAL at 15:54

## 2018-10-06 RX ADMIN — LITHIUM CARBONATE 300 MG: 300 CAPSULE, GELATIN COATED ORAL at 08:22

## 2018-10-06 NOTE — NURSING NOTE
Received patient at 1900  Patient was cooperative and visible on unit early in the evening  Patient was with loud speech when interacting with staff and labile affect  Patient still appears to be responding to internal stimuli at times  Patient was compliant with medications and appeared to sleep early  Will continue to monitor closely on assault and suicide precautions

## 2018-10-06 NOTE — PLAN OF CARE
Problem: Alteration in Thoughts and Perception  Goal: Treatment Goal: Gain control of psychotic behaviors/thinking, reduce/eliminate presenting symptoms and demonstrate improved reality functioning upon discharge  Outcome: Progressing    Goal: Refrain from acting on delusional thinking/internal stimuli  Interventions:  - Monitor patient closely, per order   - Utilize least restrictive measures   - Set reasonable limits, give positive feedback for acceptable   - Administer medications as ordered and monitor of potential side effects   Outcome: Progressing    Goal: Agree to be compliant with medication regime, as prescribed and report medication side effects  Interventions:  - Offer appropriate PRN medication and supervise ingestion; conduct aims, as needed    Outcome: Progressing    Goal: Attend and participate in unit activities, including therapeutic, recreational, and educational groups  Interventions:  - Provide therapeutic and educational activities daily, encourage attendance and participation, and document same in the medical record    Outcome: Progressing    Goal: Complete daily ADLs, including personal hygiene independently, as able  Interventions:  - Observe, teach, and assist patient with ADLS  - Monitor and promote a balance of rest/activity, with adequate nutrition and elimination    Outcome: Progressing      Problem: Risk for Self Injury/Neglect  Goal: Refrain from harming self  Interventions:  - Monitor patient closely, per order  - Develop a trusting relationship  - Supervise medication ingestion, monitor effects and side effects    Outcome: Progressing      Problem: Depression  Goal: Refrain from isolation  Interventions:  - Develop a trusting relationship   - Encourage socialization    Outcome: Progressing      Comments: Pt a&o x 4  Pt pleasant upon approach and cooperative with care  Pt with loud, pressured speech and with elated affect  Pt denies SI's, HI's, AH's, VH's    Pt appears to be responding to IS as he is very loud and speaking to self and with inappropriate shadowboxing while looking at windows  Pt reported when he makes phonecall to girlfriend, she hangs up on him  Will continue to monitor and provide therapeutic support

## 2018-10-06 NOTE — PROGRESS NOTES
Pt a&o x 4  Pt with loud, pressured speech throughout day with elated mood  Pt continues to required redirection from lowering volume while singing excessively loud  Pt social with peers

## 2018-10-07 LAB — LITHIUM SERPL-SCNC: 0.4 MMOL/L (ref 0.6–1.2)

## 2018-10-07 PROCEDURE — 80178 ASSAY OF LITHIUM: CPT | Performed by: PHYSICIAN ASSISTANT

## 2018-10-07 RX ADMIN — HALOPERIDOL 2 MG: 0.5 TABLET ORAL at 16:51

## 2018-10-07 RX ADMIN — LITHIUM CARBONATE 300 MG: 300 CAPSULE, GELATIN COATED ORAL at 16:51

## 2018-10-07 RX ADMIN — LITHIUM CARBONATE 300 MG: 300 CAPSULE, GELATIN COATED ORAL at 12:29

## 2018-10-07 RX ADMIN — LITHIUM CARBONATE 300 MG: 300 CAPSULE, GELATIN COATED ORAL at 08:03

## 2018-10-07 RX ADMIN — HALOPERIDOL 2 MG: 0.5 TABLET ORAL at 08:03

## 2018-10-07 RX ADMIN — HALOPERIDOL 2 MG: 0.5 TABLET ORAL at 20:49

## 2018-10-07 NOTE — PROGRESS NOTES
Pt continues to perseverate over discharge and feels he is being held at hospital for no reason  Pt observed spontaneously breaking into dance while walking through unit  May possible be responding to command AH's  Will continue to monitor

## 2018-10-07 NOTE — PLAN OF CARE
Problem: Alteration in Thoughts and Perception  Goal: Treatment Goal: Gain control of psychotic behaviors/thinking, reduce/eliminate presenting symptoms and demonstrate improved reality functioning upon discharge  Outcome: Progressing    Goal: Refrain from acting on delusional thinking/internal stimuli  Interventions:  - Monitor patient closely, per order   - Utilize least restrictive measures   - Set reasonable limits, give positive feedback for acceptable   - Administer medications as ordered and monitor of potential side effects   Outcome: Progressing    Goal: Agree to be compliant with medication regime, as prescribed and report medication side effects  Interventions:  - Offer appropriate PRN medication and supervise ingestion; conduct aims, as needed    Outcome: Progressing    Goal: Attend and participate in unit activities, including therapeutic, recreational, and educational groups  Interventions:  - Provide therapeutic and educational activities daily, encourage attendance and participation, and document same in the medical record    Outcome: Progressing    Goal: Complete daily ADLs, including personal hygiene independently, as able  Interventions:  - Observe, teach, and assist patient with ADLS  - Monitor and promote a balance of rest/activity, with adequate nutrition and elimination    Outcome: Progressing      Problem: Risk for Self Injury/Neglect  Goal: Refrain from harming self  Interventions:  - Monitor patient closely, per order  - Develop a trusting relationship  - Supervise medication ingestion, monitor effects and side effects    Outcome: Progressing      Problem: Depression  Goal: Refrain from isolation  Interventions:  - Develop a trusting relationship   - Encourage socialization    Outcome: Progressing      Comments: Pt a&o x 4  Pt excessively loud  Pt singing and dancing in conference room with peers   Pt preoccupied with discharge and pt continues to ask if he will be discharged tomorrow due to 72 hour notice expiration time  Pt also observed instructing other patients to sign 72 hour notice "so you can leave earlier"  Pt redirected from same  Pt denies SI's, HI's, AH's, VH's  Will continue to monitor and provide therapeutic support
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
## 2018-10-07 NOTE — PROGRESS NOTES
Dave Nava is loud and with pressured speech  Cooperative and pleasant on approach  Took his bedtime medications with no issue then went to bed  Will continue to monitor

## 2018-10-07 NOTE — PROGRESS NOTES
Psychiatry Progress Note    Subjective: Interval History     Aunaguila Friday has been pleasant and cooperative upon interview, calls all of the females on the unit "baby" and is quite charming in his presentation  Asking about discharge, stating that he "needs to make that money " He has appeared to be responding to internal stimuli, as staff have observed him "shadow boxing" in the reflection in the glass  He denies any hallucinations or homicidal or suicidal ideations      Current medications:    Current Facility-Administered Medications:     benztropine (COGENTIN) injection 1 mg, 1 mg, Intramuscular, Q6H PRN, Gustavo Brighter, PA-C    benztropine (COGENTIN) tablet 1 mg, 1 mg, Oral, Q6H PRN, Grandin Brighter, PA-C    haloperidol (HALDOL) tablet 2 mg, 2 mg, Oral, TID, Terry Starkey, CRNP, 2 mg at 10/07/18 0803    hydrOXYzine HCL (ATARAX) tablet 50 mg, 50 mg, Oral, Q4H PRN, Lisandroon Tiffanie, PA-C    lithium carbonate capsule 300 mg, 300 mg, Oral, TID With Meals, Clenton Tiffanie, PA-C, 300 mg at 10/07/18 1229    LORazepam (ATIVAN) 2 mg/mL injection 1 mg, 1 mg, Intramuscular, Q4H PRN, Grandin Brighter, PA-C    LORazepam (ATIVAN) tablet 1 mg, 1 mg, Oral, Q4H PRN, Grandin Brighter, PA-C    magnesium hydroxide (MILK OF MAGNESIA) 400 mg/5 mL oral suspension 20 mL, 20 mL, Oral, Daily PRN, Grandin Brighter, PA-C    nicotine polacrilex (NICORETTE) gum 2 mg, 2 mg, Oral, Q2H PRN, Grandin Brighter, PA-C, 2 mg at 10/05/18 0840    OLANZapine (ZyPREXA) IM injection 10 mg, 10 mg, Intramuscular, Q4H PRN, Clenton Phejen, PA-C    OLANZapine (ZyPREXA) tablet 10 mg, 10 mg, Oral, Q4H PRN, Clenton Phejen, PA-C    traZODone (DESYREL) tablet 100 mg, 100 mg, Oral, HS PRN, Clenton Tiffanie, PA-C      Objective:     Vital Signs:  Vitals:    10/06/18 0758 10/07/18 0713 10/07/18 0715 10/07/18 0717   BP: 123/75 116/75 110/84    BP Location: Left arm Left arm Left arm    Pulse: 92 89 93    Resp:  16     Temp:  97 7 °F (36 5 °C)     TempSrc:  Temporal     SpO2:       Weight:    73 9 kg (163 lb)   Height:             Appearance:  age appropriate, casually dressed and disheveled   Behavior:  normal   Speech:  normal volume   Mood:  irritable   Affect:  labile   Thought Process:  disorganized   Thought Content:  delusions  persecutory   Perceptual Disturbances: Auditory hallucinations without commands   Risk Potential: Potential for Aggression No   Sensorium:  person, place and time   Cognition:  intact   Consciousness:  alert and awake    Attention: attention span and concentration were age appropriate   Intellect: average   Insight:  limited   Judgment: limited      Motor Activity: no abnormal movements           Recent Labs:  Results Reviewed     None          I/O Past 24 hours:  No intake/output data recorded  No intake/output data recorded  Assessment / Plan:     [facility]    Recommended Treatment:      Medication changes:  1) increase Haldol 1 mg p o  t i d   Lithium level pending for Sunday  Non-pharmacological treatments  1) Continue with group therapy, milieu therapy and occupational therapy  Safety  1) Safety/communication plan established targeting dynamic risk factors above  2) Risks, benefits, and possible side effects of medications explained to patient and patient verbalizes understanding  Counseling / Coordination of Care    Total floor / unit time spent today 20 minutes  Greater than 50% of total time was spent with the patient and / or family counseling and / or coordination of care  A description of the counseling / coordination of care  Patient's Rights, confidentiality and exceptions to confidentiality, use of automated medical record, [facility] Kirkpatrick staff access to medical record, and consent to treatment reviewed      LUI Cartagena

## 2018-10-08 VITALS
HEART RATE: 86 BPM | BODY MASS INDEX: 22.82 KG/M2 | DIASTOLIC BLOOD PRESSURE: 74 MMHG | WEIGHT: 163 LBS | OXYGEN SATURATION: 98 % | RESPIRATION RATE: 18 BRPM | HEIGHT: 71 IN | TEMPERATURE: 97.5 F | SYSTOLIC BLOOD PRESSURE: 122 MMHG

## 2018-10-08 RX ORDER — OLANZAPINE 5 MG/1
5 TABLET ORAL 2 TIMES DAILY
Qty: 30 TABLET | Refills: 1 | Status: SHIPPED | OUTPATIENT
Start: 2018-10-08 | End: 2021-08-25 | Stop reason: ALTCHOICE

## 2018-10-08 RX ORDER — HALOPERIDOL 2 MG/1
2 TABLET ORAL 3 TIMES DAILY
Qty: 45 TABLET | Refills: 1 | Status: SHIPPED | OUTPATIENT
Start: 2018-10-08 | End: 2021-08-25 | Stop reason: ALTCHOICE

## 2018-10-08 RX ORDER — LITHIUM CARBONATE 300 MG/1
300 CAPSULE ORAL
Qty: 45 CAPSULE | Refills: 1 | Status: SHIPPED | OUTPATIENT
Start: 2018-10-08 | End: 2021-08-25 | Stop reason: ALTCHOICE

## 2018-10-08 RX ORDER — OLANZAPINE 20 MG/1
20 TABLET ORAL
Qty: 30 TABLET | Refills: 1 | Status: SHIPPED | OUTPATIENT
Start: 2018-10-08 | End: 2021-08-25 | Stop reason: ALTCHOICE

## 2018-10-08 RX ADMIN — LITHIUM CARBONATE 300 MG: 300 CAPSULE, GELATIN COATED ORAL at 08:27

## 2018-10-08 RX ADMIN — HALOPERIDOL 2 MG: 0.5 TABLET ORAL at 08:25

## 2018-10-08 NOTE — NURSING NOTE
This nurse reviewed client's discharge paperwork with him  Communicates understanding of teaching  Provided client with prescriptions upon discharge  He states he will be walking to his cousin's house  Cousin "lives close " Client denying symptoms upon discharge and excited to "go home and eat " Client escorted to exit by [facility] and discharged to self

## 2018-10-08 NOTE — PROGRESS NOTES
Client brightens upon approach  Medication and meal compliant  Denies AH/VH/HI/SI, anxiety and depression  Preoccupied with discharge this morning  Asks each staff member, as they pass him, whether he is being discharged  Easily redirected  Client very excited upon hearing he is going to be discharged today  Will continue to monitor

## 2018-10-08 NOTE — PROGRESS NOTES
Patient is very bright on unit  Patient was compliant with evening medications  Pt denies depression, anxiety, SI/HI, and AH/VH  No further issues throughout shift  Will continue to monitor

## 2018-10-08 NOTE — SOCIAL WORK
Patient signed 72 hour notice that is set to  today 10/8, no grounds for 302 at this time  Worker discussed discharge with patient  Worker provided patient with [address] walk in hours for outpatient psychiatric funding until his medical assistance becomes active  Worker asked patient where he will reside upon discharge since he cannot return to his sister's home  He reported that he was going to his cousins house located [address]   Patient was not able to provide worker with exact address  Patient throughout admission refused to sign any TIA's for any family members  Patient continues to lack insight into his mental health and need for ongoing medication  Patient's medications were sent to [facility] pharmacy
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
## 2019-02-21 ENCOUNTER — TELEPHONE (OUTPATIENT)
Dept: FAMILY MEDICINE CLINIC | Facility: CLINIC | Age: 29
End: 2019-02-21

## 2019-02-21 NOTE — TELEPHONE ENCOUNTER
Kenyon Palm a new patient appointment for pt with Dr Cristopher Tucker at [phone] on 3/29/2019  Pt has a long history of Psych admittance and was dx with schizophrenia at 26  Long History of being non compliant with care and meds

## 2019-10-14 ENCOUNTER — HOSPITAL ENCOUNTER (EMERGENCY)
Facility: HOSPITAL | Age: 29
Discharge: HOME | End: 2019-10-14
Attending: EMERGENCY MEDICINE
Payer: COMMERCIAL

## 2019-10-14 VITALS
HEART RATE: 79 BPM | RESPIRATION RATE: 16 BRPM | BODY MASS INDEX: 19.46 KG/M2 | OXYGEN SATURATION: 100 % | DIASTOLIC BLOOD PRESSURE: 74 MMHG | WEIGHT: 139.55 LBS | SYSTOLIC BLOOD PRESSURE: 132 MMHG | TEMPERATURE: 97.2 F

## 2019-10-14 DIAGNOSIS — L73.9 FOLLICULITIS: Primary | ICD-10-CM

## 2019-10-14 PROCEDURE — 99284 EMERGENCY DEPT VISIT MOD MDM: CPT | Performed by: EMERGENCY MEDICINE

## 2019-10-14 PROCEDURE — 99283 EMERGENCY DEPT VISIT LOW MDM: CPT

## 2019-10-14 RX ORDER — CEPHALEXIN 250 MG/1
500 CAPSULE ORAL EVERY 8 HOURS SCHEDULED
Qty: 30 CAPSULE | Refills: 0 | Status: SHIPPED | OUTPATIENT
Start: 2019-10-14 | End: 2019-10-19

## 2019-10-14 NOTE — ED PROVIDER NOTES
History  Chief Complaint   Patient presents with    Headache     started yesterday  denies taking any meds for relief  HPI    This is a 22-year-old male presents to the emergency department with a headache which is confined to the left occipital area  Patient noticed he had a bump in that area  Patient denies have any trauma  No nausea vomiting diarrhea fever chills  No recent travel outside the geographical area in the last calendar month  Patient noticed when he presses on the area that bothersome and elicits pain  Patient did not take any medications prior to arrival to alleviate the pain  Prior to Admission Medications   Prescriptions Last Dose Informant Patient Reported? Taking? OLANZapine (ZyPREXA) 20 MG tablet   No No   Sig: Take 1 tablet (20 mg total) by mouth daily at bedtime   OLANZapine (ZyPREXA) 5 mg tablet   No No   Sig: Take 1 tablet (5 mg total) by mouth 2 (two) times a day At 9AM and 2PM   haloperidol (HALDOL) 2 mg tablet   No No   Sig: Take 1 tablet (2 mg total) by mouth 3 (three) times a day   lithium carbonate 300 mg capsule   No No   Sig: Take 1 capsule (300 mg total) by mouth 3 (three) times a day with meals      Facility-Administered Medications: None       Past Medical History:   Diagnosis Date    Disease of thyroid gland     Hallucination     Psychiatric disorder     Psychiatric illness     [facility]        History reviewed  No pertinent surgical history  Family History   Family history unknown: Yes     I have reviewed and agree with the history as documented  Social History     Tobacco Use    Smoking status: Current Every Day Smoker     Packs/day: 0 50     Types: Cigarettes    Smokeless tobacco: Never Used   Substance Use Topics    Alcohol use: Yes     Alcohol/week: 7 0 - 10 0 standard drinks     Types: 7 - 10 Cans of beer per week     Comment: Socially    Drug use: No        Review of Systems   Constitutional: Negative  HENT: Negative      Eyes: Negative  Respiratory: Negative  Cardiovascular: Negative  Gastrointestinal: Negative  Endocrine: Negative  Genitourinary: Negative  Musculoskeletal: Negative  Allergic/Immunologic: Negative  Neurological: Positive for headaches  Hematological: Negative  Psychiatric/Behavioral: Negative  Physical Exam  Physical Exam   Constitutional: He is oriented to person, place, and time  He appears well-developed and well-nourished  HENT:   Head: Atraumatic  Right Ear: External ear normal    Left Ear: External ear normal    Nose: Nose normal    Mouth/Throat: Oropharynx is clear and moist    Eyes: Pupils are equal, round, and reactive to light  Conjunctivae and EOM are normal    Neck: Normal range of motion  Neck supple  Cardiovascular: Normal rate  Pulmonary/Chest: Effort normal    Abdominal: Soft  Bowel sounds are normal    Musculoskeletal: Normal range of motion  Neurological: He is alert and oriented to person, place, and time  Skin: Skin is warm and dry  Capillary refill takes less than 2 seconds  Nursing note and vitals reviewed  Vital Signs  ED Triage Vitals [10/14/19 1741]   Temperature Pulse Respirations Blood Pressure SpO2   (!) 97 2 °F (36 2 °C) 79 16 132/74 100 %      Temp Source Heart Rate Source Patient Position - Orthostatic VS BP Location FiO2 (%)   Tympanic Monitor Lying Left arm --      Pain Score       --           Vitals:    10/14/19 1741   BP: 132/74   Pulse: 79   Patient Position - Orthostatic VS: Lying         Visual Acuity      ED Medications  Medications - No data to display    Diagnostic Studies  Results Reviewed     None                 No orders to display              Procedures  Procedures       ED Course                               MDM  Number of Diagnoses or Management Options  Folliculitis:   Diagnosis management comments: This is a 59-year-old gentleman presents to the emergency department with a mild headache    Patient has evidence of a small folliculitis over the left occipital area  Patient will be started on the Keflex  Patient's tetanus is updated  Patient's neurological exam is nonfocal   She is stable discharge  Disposition  Final diagnoses: Folliculitis     Time reflects when diagnosis was documented in both MDM as applicable and the Disposition within this note     Time User Action Codes Description Comment    10/14/2019  5:49 PM Jaida Nur Add [A14 5] Folliculitis       ED Disposition     ED Disposition Condition Date/Time Comment    Discharge Stable Mon Oct 14, 2019  5:49 PM Tiffanie Holland discharge to home/self care  Follow-up Information     Follow up With Specialties Details Why Contact Info    [facility]   [facility] [address]  [phone]            Discharge Medication List as of 10/14/2019  5:57 PM      START taking these medications    Details   cephalexin (KEFLEX) 250 mg capsule Take 2 capsules (500 mg total) by mouth every 8 (eight) hours for 5 days, Starting Mon 10/14/2019, Until Sat 10/19/2019, Print         CONTINUE these medications which have NOT CHANGED    Details   haloperidol (HALDOL) 2 mg tablet Take 1 tablet (2 mg total) by mouth 3 (three) times a day, Starting Mon 10/8/2018, Print      lithium carbonate 300 mg capsule Take 1 capsule (300 mg total) by mouth 3 (three) times a day with meals, Starting Mon 10/8/2018, Print      !! OLANZapine (ZyPREXA) 20 MG tablet Take 1 tablet (20 mg total) by mouth daily at bedtime, Starting Mon 10/8/2018, Print      !! OLANZapine (ZyPREXA) 5 mg tablet Take 1 tablet (5 mg total) by mouth 2 (two) times a day At 9AM and 2PM, Starting Mon 10/8/2018, Print       !! - Potential duplicate medications found  Please discuss with provider  No discharge procedures on file      ED Provider  Electronically Signed by           Pedro Castro III, DO  10/15/19 1608

## 2020-09-01 ENCOUNTER — HOSPITAL ENCOUNTER (EMERGENCY)
Facility: HOSPITAL | Age: 30
Discharge: HOME | End: 2020-09-01
Attending: EMERGENCY MEDICINE

## 2020-09-01 VITALS
BODY MASS INDEX: 19.63 KG/M2 | DIASTOLIC BLOOD PRESSURE: 81 MMHG | OXYGEN SATURATION: 100 % | TEMPERATURE: 96.9 F | HEART RATE: 98 BPM | RESPIRATION RATE: 16 BRPM | WEIGHT: 140.21 LBS | SYSTOLIC BLOOD PRESSURE: 149 MMHG | HEIGHT: 71 IN

## 2020-09-01 DIAGNOSIS — F20.9 SCHIZOPHRENIA (HCC): Primary | ICD-10-CM

## 2020-09-01 PROCEDURE — 99282 EMERGENCY DEPT VISIT SF MDM: CPT | Performed by: EMERGENCY MEDICINE

## 2020-09-01 PROCEDURE — 99281 EMR DPT VST MAYX REQ PHY/QHP: CPT

## 2020-09-01 NOTE — ED PROVIDER NOTES
History  Chief Complaint   Patient presents with    Medication Refill     Was told to come with a friend to get his monthly shot; states last one about a year ago  61-year-old gentleman presents requesting his monthly Haldol injection  His friend states that he has not had this medication in approximately one year  At times he has been on p o  Medications but he most part has refused these  Patient denies any thoughts of self-harm, suicide, homicidal ideation  Has a history of loosen a shins and schizophrenia and his mother has been trying to force him to have his medications restarted  Patient states he does not currently have a psychiatrist and was planning to just call around to find a new one  He denies any other acute issues or concerns  Medication Refill   Medications/supplies requested:  Haldol injection  Reason for request:  Medications ran out  Medications taken before: no    Patient has complete original prescription information: no        Prior to Admission Medications   Prescriptions Last Dose Informant Patient Reported? Taking?    OLANZapine (ZyPREXA) 20 MG tablet Not Taking at Unknown time  No No   Sig: Take 1 tablet (20 mg total) by mouth daily at bedtime   Patient not taking: Reported on 9/1/2020   OLANZapine (ZyPREXA) 5 mg tablet Not Taking at Unknown time  No No   Sig: Take 1 tablet (5 mg total) by mouth 2 (two) times a day At 9AM and 2PM   Patient not taking: Reported on 9/1/2020   haloperidol (HALDOL) 2 mg tablet Not Taking at Unknown time  No No   Sig: Take 1 tablet (2 mg total) by mouth 3 (three) times a day   Patient not taking: Reported on 9/1/2020   lithium carbonate 300 mg capsule Not Taking at Unknown time  No No   Sig: Take 1 capsule (300 mg total) by mouth 3 (three) times a day with meals   Patient not taking: Reported on 9/1/2020      Facility-Administered Medications: None       Past Medical History:   Diagnosis Date    Disease of thyroid gland     Hallucination     Psychiatric disorder     Psychiatric illness     Schizophrenia [facility])        History reviewed  No pertinent surgical history  Family History   Family history unknown: Yes     I have reviewed and agree with the history as documented  E-Cigarette/Vaping    E-Cigarette Use Never User      E-Cigarette/Vaping Substances     Social History     Tobacco Use    Smoking status: Current Every Day Smoker     Packs/day: 2 00     Types: Cigarettes    Smokeless tobacco: Never Used   Substance Use Topics    Alcohol use: Not Currently     Alcohol/week: 7 0 - 10 0 standard drinks     Types: 7 - 10 Cans of beer per week     Comment: Socially    Drug use: No       Review of Systems   Respiratory: Negative  Cardiovascular: Negative  Gastrointestinal: Negative  Psychiatric/Behavioral: Positive for hallucinations  Negative for confusion and suicidal ideas  All other systems reviewed and are negative  Physical Exam  Physical Exam  Vitals signs and nursing note reviewed  Constitutional:       Appearance: He is well-developed  HENT:      Head: Normocephalic and atraumatic  Pulmonary:      Effort: Pulmonary effort is normal  No respiratory distress  Skin:     General: Skin is warm and dry  Neurological:      General: No focal deficit present  Mental Status: He is alert  Psychiatric:         Mood and Affect: Affect is flat  Behavior: Behavior normal  Behavior is cooperative           Cognition and Memory: Cognition normal          Judgment: Judgment normal          Vital Signs  ED Triage Vitals [09/01/20 1639]   Temperature Pulse Respirations Blood Pressure SpO2   (!) 96 9 °F (36 1 °C) 98 16 149/81 100 %      Temp Source Heart Rate Source Patient Position - Orthostatic VS BP Location FiO2 (%)   Tympanic Monitor Sitting Left arm --      Pain Score       --           Vitals:    09/01/20 1639   BP: 149/81   Pulse: 98   Patient Position - Orthostatic VS: Sitting         Visual Acuity      ED Medications  Medications - No data to display    Diagnostic Studies  Results Reviewed     None                 No orders to display              Procedures  Procedures         ED Course       US AUDIT      Most Recent Value   Initial Alcohol Screen: US AUDIT-C    1  How often do you have a drink containing alcohol?  0 Filed at: 09/01/2020 1640   2  How many drinks containing alcohol do you have on a typical day you are drinking? 0 Filed at: 09/01/2020 1640   3a  Male UNDER 65: How often do you have five or more drinks on one occasion? 0 Filed at: 09/01/2020 1640   3b  FEMALE Any Age, or MALE 65+: How often do you have 4 or more drinks on one occassion? 0 Filed at: 09/01/2020 1640   Audit-C Score  0 Filed at: 09/01/2020 1640                  FOX/DAST-10      Most Recent Value   How many times in the past year have you    Used an illegal drug or used a prescription medication for non-medical reasons? Never Filed at: 09/01/2020 1640                                [facility]  Number of Diagnoses or Management Options  Schizophrenia [facility]):   Diagnosis management comments: 68-year-old gentleman presents requesting his Haldol injection  The patient has been without this medication for the past year and currently does not have a psychiatrist   I explained to him that the ER is not able to provide monthly Haldol injections and given that he has been off his medication for one year, we will not be refilling any Haldol tablets either  Was provided with follow-up information for Psychiatry  He does not meet any criteria for acute inpatient psychiatric care  He is aware of the importance of follow-up along with reasons return to the ER          Disposition  Final diagnoses:   Schizophrenia ([facility] [address] )     Time reflects when diagnosis was documented in both MDM as applicable and the Disposition within this note     Time User Action Codes Description Comment    9/1/2020  4:52 PM Jaron Tijerina Add [F20 9] Schizophrenia [facility])       ED Disposition     ED Disposition Condition Date/Time Comment    Discharge Stable Tue Sep 1, 2020  4:51 PM Tiffanie Holland discharge to home/self care  Follow-up Information    None         Discharge Medication List as of 9/1/2020  4:52 PM      CONTINUE these medications which have NOT CHANGED    Details   haloperidol (HALDOL) 2 mg tablet Take 1 tablet (2 mg total) by mouth 3 (three) times a day, Starting Mon 10/8/2018, Print      lithium carbonate 300 mg capsule Take 1 capsule (300 mg total) by mouth 3 (three) times a day with meals, Starting Mon 10/8/2018, Print      !! OLANZapine (ZyPREXA) 20 MG tablet Take 1 tablet (20 mg total) by mouth daily at bedtime, Starting Mon 10/8/2018, Print      !! OLANZapine (ZyPREXA) 5 mg tablet Take 1 tablet (5 mg total) by mouth 2 (two) times a day At 9AM and 2PM, Starting Mon 10/8/2018, Print       !! - Potential duplicate medications found  Please discuss with provider  No discharge procedures on file      PDMP Review     None          ED Provider  Electronically Signed by           Erika Garcia DO  09/01/20 9450

## 2021-08-23 ENCOUNTER — HOSPITAL ENCOUNTER (EMERGENCY)
Facility: HOSPITAL | Age: 31
Discharge: HOME | End: 2021-08-23
Attending: EMERGENCY MEDICINE

## 2021-08-23 VITALS
TEMPERATURE: 98 F | SYSTOLIC BLOOD PRESSURE: 130 MMHG | OXYGEN SATURATION: 100 % | HEART RATE: 96 BPM | DIASTOLIC BLOOD PRESSURE: 62 MMHG | BODY MASS INDEX: 20.02 KG/M2 | WEIGHT: 143.52 LBS | RESPIRATION RATE: 16 BRPM

## 2021-08-23 DIAGNOSIS — Z76.0 ENCOUNTER FOR MEDICATION REFILL: Primary | ICD-10-CM

## 2021-08-23 PROCEDURE — 99283 EMERGENCY DEPT VISIT LOW MDM: CPT

## 2021-08-23 PROCEDURE — 99283 EMERGENCY DEPT VISIT LOW MDM: CPT | Performed by: EMERGENCY MEDICINE

## 2021-08-23 NOTE — ED PROVIDER NOTES
History  Chief Complaint   Patient presents with    Medical Problem     Patient denies SI, HI, AH, VH  patient calm and cooperative in triage  Family states patient needs a haldol injection  hx of schizophrenia  family states used to have monthly haldol injections but has been off medications for awhile  family states he can get aggresive at home  81-year-old male with history of schizophrenia presents to the emergency department requesting his monthly Haldol injection  Patient has been off all his medications for the last 2 years since moving to the area  His family states he can sometimes get aggressive at home  He has no suicidal or homicidal ideations  He sometimes hears voices  On review of the records, he was seen at another facility about a year ago for this exact same complaint and was told to follow-up with a psychiatrist, but he never has  History provided by:  Patient and relative   used: No    Medical Problem  Location:  Requesting monthly Haldol injection  Context:  Has not taken any of his psychiatric meds in 2 years  Associated symptoms: no headaches        Prior to Admission Medications   Prescriptions Last Dose Informant Patient Reported? Taking?    OLANZapine (ZyPREXA) 20 MG tablet   No No   Sig: Take 1 tablet (20 mg total) by mouth daily at bedtime   Patient not taking: Reported on 9/1/2020   OLANZapine (ZyPREXA) 5 mg tablet   No No   Sig: Take 1 tablet (5 mg total) by mouth 2 (two) times a day At 9AM and 2PM   Patient not taking: Reported on 9/1/2020   haloperidol (HALDOL) 2 mg tablet   No No   Sig: Take 1 tablet (2 mg total) by mouth 3 (three) times a day   Patient not taking: Reported on 9/1/2020   lithium carbonate 300 mg capsule   No No   Sig: Take 1 capsule (300 mg total) by mouth 3 (three) times a day with meals   Patient not taking: Reported on 9/1/2020      Facility-Administered Medications: None       Past Medical History:   Diagnosis Date    Disease of thyroid gland     Hallucination     Psychiatric disorder     Psychiatric illness     Schizophrenia [facility])        History reviewed  No pertinent surgical history  Family History   Family history unknown: Yes     I have reviewed and agree with the history as documented  E-Cigarette/Vaping    E-Cigarette Use Never User      E-Cigarette/Vaping Substances     Social History     Tobacco Use    Smoking status: Current Every Day Smoker     Packs/day: 0 50     Types: Cigarettes    Smokeless tobacco: Never Used   Vaping Use    Vaping Use: Never used   Substance Use Topics    Alcohol use: Not Currently     Alcohol/week: 7 0 - 10 0 standard drinks     Types: 7 - 10 Cans of beer per week     Comment: Socially    Drug use: No       Review of Systems   Constitutional: Negative  HENT: Negative  Eyes: Negative  Respiratory: Negative  Cardiovascular: Negative  Gastrointestinal: Negative  Genitourinary: Negative  Musculoskeletal: Negative for neck pain  Skin: Negative  Allergic/Immunologic: Negative  Neurological: Negative  Negative for weakness, numbness and headaches  Hematological: Negative  Psychiatric/Behavioral: Positive for hallucinations  Negative for dysphoric mood, self-injury and suicidal ideas  All other systems reviewed and are negative  Physical Exam  Physical Exam  Vitals and nursing note reviewed  Constitutional:       General: He is awake  He is not in acute distress  Appearance: Normal appearance  He is well-developed and normal weight  He is not ill-appearing, toxic-appearing or diaphoretic  HENT:      Head: Normocephalic and atraumatic  Right Ear: External ear normal       Left Ear: External ear normal    Eyes:      Conjunctiva/sclera: Conjunctivae normal       Pupils: Pupils are equal, round, and reactive to light  Neck:      Thyroid: No thyromegaly  Vascular: No JVD     Cardiovascular:      Rate and Rhythm: Normal rate and regular rhythm  Heart sounds: Normal heart sounds  Pulmonary:      Effort: Pulmonary effort is normal       Breath sounds: Normal breath sounds  Musculoskeletal:         General: No signs of injury  Skin:     General: Skin is warm and dry  Coloration: Skin is not jaundiced or pale  Findings: No bruising, erythema, lesion or rash  Neurological:      General: No focal deficit present  Mental Status: He is alert and oriented to person, place, and time  Motor: No weakness  Deep Tendon Reflexes: Reflexes are normal and symmetric  Psychiatric:         Attention and Perception: Attention and perception normal          Mood and Affect: Mood normal          Speech: Speech normal          Behavior: Behavior normal  Behavior is cooperative  Thought Content: Thought content normal          Vital Signs  ED Triage Vitals [08/23/21 1527]   Temperature Pulse Respirations Blood Pressure SpO2   98 °F (36 7 °C) 96 16 130/62 100 %      Temp Source Heart Rate Source Patient Position - Orthostatic VS BP Location FiO2 (%)   Oral Monitor Sitting Right arm --      Pain Score       --           Vitals:    08/23/21 1527   BP: 130/62   Pulse: 96   Patient Position - Orthostatic VS: Sitting         Visual Acuity      ED Medications  Medications - No data to display    Diagnostic Studies  Results Reviewed     None                 No orders to display              Procedures  Procedures         ED Course                                           MDM  Number of Diagnoses or Management Options  Diagnosis management comments: 81-year-old male presents to the emergency department requesting his monthly Haldol shot  He has not had any of his psychiatric meds in 2 years since moving to the area  He was seen at another ED about a year ago at this time for the same complaint and was told to follow-up with Psychiatry, however he has not tried to find a psychiatrist at this point    He has had no suicidal or homicidal ideations  His family states he sometimes gets aggressive at home  On exam he is alert in no distress and very calm and cooperative  He and his family was informed that the ED does not carry that type of Haldol  Will provide outpatient referrals for Psychiatry  Patient is stable for discharge  Disposition  Final diagnoses:   Encounter for medication refill     Time reflects when diagnosis was documented in both MDM as applicable and the Disposition within this note     Time User Action Codes Description Comment    8/23/2021  4:36 PM Leonia Sever A Add [Z76 0] Encounter for medication refill       ED Disposition     ED Disposition Condition Date/Time Comment    Discharge Good Mon Aug 23, 2021  4:36  [facility] discharge to home/self care  Follow-up Information     Follow up With Specialties Details Why Contact Info [facility]  Schedule an appointment as soon as possible for a visit in 1 day  Proceed to [facility] behavioral health walk in located at [address] [place] [address]  Phone number [phone]          Patient's Medications   Discharge Prescriptions    No medications on file     No discharge procedures on file      PDMP Review     None          ED Provider  Electronically Signed by           Kendra Church DO  08/23/21 0120

## 2021-08-25 ENCOUNTER — OFFICE VISIT (OUTPATIENT)
Dept: FAMILY MEDICINE CLINIC | Facility: CLINIC | Age: 31
End: 2021-08-25

## 2021-08-25 VITALS
TEMPERATURE: 98.4 F | SYSTOLIC BLOOD PRESSURE: 106 MMHG | RESPIRATION RATE: 18 BRPM | DIASTOLIC BLOOD PRESSURE: 70 MMHG | HEIGHT: 70 IN | BODY MASS INDEX: 20.24 KG/M2 | OXYGEN SATURATION: 96 % | HEART RATE: 63 BPM | WEIGHT: 141.4 LBS

## 2021-08-25 DIAGNOSIS — F20.0 PARANOID SCHIZOPHRENIA (HCC): Primary | ICD-10-CM

## 2021-08-25 DIAGNOSIS — Z00.00 ENCOUNTER FOR ANNUAL PHYSICAL EXAM: ICD-10-CM

## 2021-08-25 PROCEDURE — 99213 OFFICE O/P EST LOW 20 MIN: CPT | Performed by: FAMILY MEDICINE

## 2021-08-25 RX ORDER — HALOPERIDOL DECANOATE 50 MG/ML
50 INJECTION INTRAMUSCULAR
Qty: 1 ML | Refills: 0 | Status: SHIPPED | OUTPATIENT
Start: 2021-08-25 | End: 2021-08-26
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
## 2021-08-25 NOTE — ASSESSMENT & PLAN NOTE
-Disorganized, Paranoia (has 4 phones with him during this visit)  -Diagnosed in 2013 in [place]; sometimes aggressive in his behavior, but has never hurt himself as per mother  -Denies SI/HI/AVH at this time  -Haldol 50 mg (1mL) IM ordered to pharmacy; explained to mother and patient to bring the medication to the clinic for administration, while we wait for outpatient psychiatric placement    -Will send for TSH, CMP, CBC, A1C  -Patient to return on 8/26/2021 for administration  -Referral placed to Psychiatry and social work to help with medication management given non-compliance history  -Follow up in 4 weeks for annual physical and possible next Haldol injection, if no placement yet  Patient and mother are agreeable

## 2021-08-25 NOTE — PATIENT INSTRUCTIONS
Schizophrenia   WHAT YOU NEED TO KNOW:   Schizophrenia is a chronic mental disorder that affects your emotions, thinking, and behavior  Your brain has a hard time knowing what is real   DISCHARGE INSTRUCTIONS:   Call your local emergency number (911 in the [address]) if   · You think about killing yourself or someone else  · You have a rash, swelling, or trouble breathing after you take your medicine  When should I call my doctor? · You feel that you are having symptoms of schizophrenia  · You are not able to sleep well, or are sleeping more than usual     · You cannot eat or are eating more than usual     · You have questions or concerns about your condition or care  Medicines:  Do not stop taking your medicine  even if you feel better  · Antipsychotics: These help decrease psychotic symptoms and severe agitation  You may need antiparkinson medicine to control muscle stiffness, twitches, and restlessness caused by antipsychotic medicines  · Take your medicine as directed  Contact your healthcare provider if you think your medicine is not helping or if you have side effects  Tell him of her if you are allergic to any medicine  Keep a list of the medicines, vitamins, and herbs you take  Include the amounts, and when and why you take them  Bring the list or the pill bottles to follow-up visits  Carry your medicine list with you in case of an emergency  Follow up with your psychiatrist as directed:  Write down your questions so you remember to ask them during your visits  Manage your symptoms:   · Do not stop taking your medicines  Tell your healthcare provider or psychiatrist if you have any problems with or questions about your medicines  · Do not stop your therapies  It is normal to have doubts about or to feel discomfort with your therapy  Tell your healthcare provider or psychiatrist if you are not comfortable or have questions about your therapies      · Get regular sleep :  Try to get 6 to 8 hours of sleep each night  Tell your healthcare provider or psychiatrist if you are not able to sleep, or if you are sleeping too much  · Do not drink alcohol  Alcohol interacts with medicine used to treat schizophrenia  © Copyright [employer] [facility] 2021 Information is for End User's use only and may not be sold, redistributed or otherwise used for commercial purposes  All illustrations and images included in CareNotes® are the copyrighted property of [employer]  or [facility] Mary Gaspar   The above information is an  only  It is not intended as medical advice for individual conditions or treatments  Talk to your doctor, nurse or pharmacist before following any medical regimen to see if it is safe and effective for you

## 2021-08-26 ENCOUNTER — OFFICE VISIT (OUTPATIENT)
Dept: FAMILY MEDICINE CLINIC | Facility: CLINIC | Age: 31
End: 2021-08-26

## 2021-08-26 ENCOUNTER — TELEPHONE (OUTPATIENT)
Dept: FAMILY MEDICINE CLINIC | Facility: CLINIC | Age: 31
End: 2021-08-26

## 2021-08-26 ENCOUNTER — APPOINTMENT (OUTPATIENT)
Dept: LAB | Facility: HOSPITAL | Age: 31
End: 2021-08-26

## 2021-08-26 VITALS
RESPIRATION RATE: 16 BRPM | TEMPERATURE: 98.7 F | SYSTOLIC BLOOD PRESSURE: 110 MMHG | WEIGHT: 141 LBS | HEIGHT: 70 IN | OXYGEN SATURATION: 97 % | DIASTOLIC BLOOD PRESSURE: 70 MMHG | HEART RATE: 100 BPM | BODY MASS INDEX: 20.19 KG/M2

## 2021-08-26 DIAGNOSIS — F20.0 PARANOID SCHIZOPHRENIA (HCC): ICD-10-CM

## 2021-08-26 DIAGNOSIS — F20.0 PARANOID SCHIZOPHRENIA (HCC): Primary | ICD-10-CM

## 2021-08-26 DIAGNOSIS — Z00.00 ENCOUNTER FOR ANNUAL PHYSICAL EXAM: ICD-10-CM

## 2021-08-26 LAB
ALBUMIN SERPL BCP-MCNC: 4.5 G/DL (ref 3–5.2)
ALP SERPL-CCNC: 93 U/L (ref 43–122)
ALT SERPL W P-5'-P-CCNC: 18 U/L
AMPHETAMINES SERPL QL SCN: NEGATIVE
ANION GAP SERPL CALCULATED.3IONS-SCNC: 11 MMOL/L (ref 5–14)
AST SERPL W P-5'-P-CCNC: 29 U/L (ref 17–59)
BARBITURATES UR QL: NEGATIVE
BASOPHILS # BLD AUTO: 0.1 THOUSANDS/ΜL (ref 0–0.1)
BASOPHILS NFR BLD AUTO: 1 % (ref 0–1)
BENZODIAZ UR QL: NEGATIVE
BILIRUB SERPL-MCNC: 0.39 MG/DL
BUN SERPL-MCNC: 9 MG/DL (ref 5–25)
CALCIUM SERPL-MCNC: 9.5 MG/DL (ref 8.4–10.2)
CHLORIDE SERPL-SCNC: 104 MMOL/L (ref 97–108)
CHOLEST SERPL-MCNC: 142 MG/DL
CO2 SERPL-SCNC: 27 MMOL/L (ref 22–30)
COCAINE UR QL: NEGATIVE
CREAT SERPL-MCNC: 0.82 MG/DL (ref 0.7–1.5)
EOSINOPHIL # BLD AUTO: 0.2 THOUSAND/ΜL (ref 0–0.4)
EOSINOPHIL NFR BLD AUTO: 3 % (ref 0–6)
ERYTHROCYTE [DISTWIDTH] IN BLOOD BY AUTOMATED COUNT: 13.3 %
GFR SERPL CREATININE-BSD FRML MDRD: 119 ML/MIN/1.73SQ M
GLUCOSE P FAST SERPL-MCNC: 81 MG/DL (ref 70–99)
HCT VFR BLD AUTO: 46.7 % (ref 41–53)
HDLC SERPL-MCNC: 49 MG/DL
HGB BLD-MCNC: 16 G/DL (ref 13.5–17.5)
LDLC SERPL CALC-MCNC: 79 MG/DL
LYMPHOCYTES # BLD AUTO: 1.6 THOUSANDS/ΜL (ref 0.5–4)
LYMPHOCYTES NFR BLD AUTO: 22 % (ref 25–45)
MCH RBC QN AUTO: 32.3 PG (ref 26–34)
MCHC RBC AUTO-ENTMCNC: 34.2 G/DL (ref 31–36)
MCV RBC AUTO: 95 FL (ref 80–100)
METHADONE UR QL: NEGATIVE
MONOCYTES # BLD AUTO: 0.5 THOUSAND/ΜL (ref 0.2–0.9)
MONOCYTES NFR BLD AUTO: 6 % (ref 1–10)
NEUTROPHILS # BLD AUTO: 5 THOUSANDS/ΜL (ref 1.8–7.8)
NEUTS SEG NFR BLD AUTO: 68 % (ref 45–65)
OPIATES UR QL SCN: NEGATIVE
OXYCODONE+OXYMORPHONE UR QL SCN: NEGATIVE
PCP UR QL: NEGATIVE
PLATELET # BLD AUTO: 224 THOUSANDS/UL (ref 150–450)
PMV BLD AUTO: 8.5 FL (ref 8.9–12.7)
POTASSIUM SERPL-SCNC: 3.7 MMOL/L (ref 3.6–5)
PROT SERPL-MCNC: 8 G/DL (ref 5.9–8.4)
RBC # BLD AUTO: 4.94 MILLION/UL (ref 4.5–5.9)
SODIUM SERPL-SCNC: 142 MMOL/L (ref 137–147)
THC UR QL: POSITIVE
TRIGL SERPL-MCNC: 72 MG/DL
TSH SERPL DL<=0.05 MIU/L-ACNC: 0.83 UIU/ML (ref 0.47–4.68)
WBC # BLD AUTO: 7.3 THOUSAND/UL (ref 4.5–11)

## 2021-08-26 PROCEDURE — 99214 OFFICE O/P EST MOD 30 MIN: CPT | Performed by: FAMILY MEDICINE

## 2021-08-26 PROCEDURE — 85025 COMPLETE CBC W/AUTO DIFF WBC: CPT

## 2021-08-26 PROCEDURE — 93000 ELECTROCARDIOGRAM COMPLETE: CPT | Performed by: FAMILY MEDICINE

## 2021-08-26 PROCEDURE — 84443 ASSAY THYROID STIM HORMONE: CPT

## 2021-08-26 PROCEDURE — 80053 COMPREHEN METABOLIC PANEL: CPT

## 2021-08-26 PROCEDURE — 36415 COLL VENOUS BLD VENIPUNCTURE: CPT

## 2021-08-26 PROCEDURE — 83036 HEMOGLOBIN GLYCOSYLATED A1C: CPT

## 2021-08-26 PROCEDURE — 80307 DRUG TEST PRSMV CHEM ANLYZR: CPT | Performed by: STUDENT IN AN ORGANIZED HEALTH CARE EDUCATION/TRAINING PROGRAM

## 2021-08-26 PROCEDURE — 80061 LIPID PANEL: CPT

## 2021-08-26 NOTE — PROGRESS NOTES
Assessment/Plan:    Paranoid schizophrenia ([facility] )  Patient has disorganized behavior but denies hallucinations  Directible, does not appear to be a danger to himself at this time    EKG today was normal, shows NSR at 87 bpm without ST-T wave changes  Rapid drug screen and lipid panel ordered  Will not administer the haldol which was ordered yesterday  Advised to get labs done and return for medications  Message sent to referral team and social work for psychiatric placement  Diagnoses and all orders for this visit:    Paranoid schizophrenia ([facility] )  -     POCT ECG  -     Rapid drug screen, urine  -     Lipid Panel with Direct LDL reflex; Future          Subjective:      Patient ID: Michelle Arellano is a 36 y o  male  HPI     Patient is here for follow up for schizophrenia  The following portions of the patient's history were reviewed and updated as appropriate: allergies, current medications, past family history, past medical history, past social history, past surgical history and problem list     Review of Systems   Constitutional: Negative for activity change, chills, fatigue and fever  Respiratory: Negative for shortness of breath  Cardiovascular: Negative for chest pain  Endocrine: Negative for polydipsia  Neurological: Negative for dizziness, tremors, seizures and headaches  Psychiatric/Behavioral: Positive for behavioral problems  Negative for hallucinations, self-injury, sleep disturbance and suicidal ideas  Objective:    /70 (BP Location: Left arm, Patient Position: Sitting, Cuff Size: Standard)   Pulse 100   Temp 98 7 °F (37 1 °C) (Temporal)   Resp 16   Ht 5' 10 25" (1 784 m)   Wt 64 kg (141 lb)   SpO2 97%   BMI 20 09 kg/m²      Physical Exam  Vitals reviewed  Constitutional:       General: He is not in acute distress  Neck:      Thyroid: No thyromegaly  Trachea: No tracheal deviation  Cardiovascular:      Rate and Rhythm: Normal rate and regular rhythm  Heart sounds: Normal heart sounds  No murmur heard  Pulmonary:      Effort: Pulmonary effort is normal       Breath sounds: Normal breath sounds  Abdominal:      General: Bowel sounds are normal       Palpations: Abdomen is soft  Musculoskeletal:      Cervical back: Normal range of motion and neck supple  Lymphadenopathy:      Cervical: No cervical adenopathy  Skin:     General: Skin is warm  Capillary Refill: Capillary refill takes less than 2 seconds  Neurological:      Mental Status: He is alert and oriented to person, place, and time     Psychiatric:      Comments: Disorganized behavior

## 2021-08-26 NOTE — ASSESSMENT & PLAN NOTE
Patient has disorganized behavior but denies hallucinations  Directible, does not appear to be a danger to himself at this time    EKG today was normal, shows NSR at 87 bpm without ST-T wave changes  Rapid drug screen and lipid panel ordered  Will not administer the haldol which was ordered yesterday  Advised to get labs done and return for medications  Message sent to referral team and social work for psychiatric placement

## 2021-08-27 ENCOUNTER — INTERPROFESSIONAL CONSULTATION (OUTPATIENT)
Dept: BEHAVIORAL HEALTH UNIT | Facility: HOSPITAL | Age: 31
End: 2021-08-27
Payer: COMMERCIAL

## 2021-08-27 DIAGNOSIS — F20.9 SCHIZOPHRENIA, UNSPECIFIED TYPE (HCC): Primary | ICD-10-CM

## 2021-08-27 LAB
EST. AVERAGE GLUCOSE BLD GHB EST-MCNC: 105 MG/DL
HBA1C MFR BLD: 5.3 %

## 2021-08-27 PROCEDURE — 99447 NTRPROF PH1/NTRNET/EHR 11-20: CPT | Performed by: PSYCHIATRY & NEUROLOGY

## 2021-08-27 NOTE — LETTER
August 27, 2021     Saleem Brady MD  [address] [facility]    Patient: Monica Obrien   YOB: 1990   Date of Visit: 8/27/2021       Dear Dr Melida Berry:    I was requested to review medication management of patient's who has diagnosis of chronic schizophrenia who refused to take oral medications but agreed to take IM injection of Haldol  The patient believed Haldol helped him in the past   I did not personally evaluate the patient, but had phone conversation with [facility]'s family medicine provider, and we discussed this case  The writer strongly believes that  untreated schizophrenia leads to significant risk for health and life itself, and medication management of schizophrenia one of the most important steps in treatment  Patient denied he ever had any side effects of Haldol, and he accepted the IM SHEPH[facility]D Haldol for his treatment  Because of the patient's choice of Haldol, which is anti psychotic approved to treat schizophrenia psychosis, the writer suggested that Haldol can be provided IM as a long-acting injectable to improve adherence to medication management on outpatient basis, and the patient has paranoid delusions regarding other medications  Although Haldol may be associated with short and long-term side effects including acute dystonic reactions and tardive dyskinesia, based on my conversation with primary care the patient did not have any side effects at this point of time and did not have them in the past   This is why providing the patient anti psychotic medications is a very important step in the beginning of his rehabilitation and recovery from long term psychotic disorder  The writer understand that is meeting with a outpatient psychiatrist is already scheduled  The psychiatrist may consider to change Haldol to the different, severe antipsychotic, which may have different side effect profile    However, at this point of time Haldol should be provided as affective antipsychotic med to a schizophrenia patient, who is not on any medications at this point of time  Oral Cogentin 0 5 mg  twice a day should be provided to prevent side effects  If the patient has paranoid ideations regarding prescriptions medications, he can be suggested to take Benadryl over the counter at bedtime to reduce potential for side effects associated with Haldol, and as PRN any time if side effects appear  Long-acting Haldol Decanoate injection can be started with a small dose 50 mg, however even small dose of Haldol may lead to beneficial effect of patient with chronic psychosis        Sincerely,        Mariah Campuzano MD        CC: Dr Serafin Borrego

## 2021-08-27 NOTE — PROGRESS NOTES
INTERPROFESSIONAL (PHONE) Prema Holland 36 y o  male MRN: [id number]  Encounter Date: 08/27/21      Reason for Consult / Principal Problem: Schizophrenia    Consulting Provider: Dano Diaz MD    Requesting Provider: Devon Gaspar      ASSESSMENT: I was requested to review medication management of patient's who has diagnosis of chronic schizophrenia who refused to take oral medications but agreed to take IM injection of Haldol  The patient believed Haldol helped him in the past   I did not personally evaluate the patient, but had phone conversation with Jesse's family medicine provider, and we discussed this case  The writer strongly believes that  untreated schizophrenia leads to significant risk for health and life itself, and medication management of schizophrenia one of the most important steps in treatment  Patient denied he ever had any side effects of Haldol, and he accepted the IM SHEPHERD Haldol for his treatment  Because of the patient's choice of Haldol, which is anti psychotic approved to treat schizophrenia psychosis, the writer suggested that Haldol can be provided IM as a long-acting injectable to improve adherence to medication management on outpatient basis, and the patient has paranoid delusions regarding other medications  Although Haldol may be associated with short and long-term side effects including acute dystonic reactions and tardive dyskinesia, based on my conversation with primary care the patient did not have any side effects at this point of time and did not have them in the past   This is why providing the patient anti psychotic medications is a very important step in the beginning of his rehabilitation and recovery from long term psychotic disorder  The writer understand that is meeting with a outpatient psychiatrist is already scheduled    The psychiatrist may consider to change Haldol to the different, severe antipsychotic, which may have different side effect profile  However, at this point of time Haldol should be provided as affective antipsychotic med to a schizophrenia patient, who is not on any medications at this point of time  Oral Cogentin 0 5 mg  twice a day should be provided to prevent side effects  If the patient has paranoid ideations regarding prescriptions medications, he can be suggested to take Benadryl over the counter at bedtime to reduce potential for side effects associated with Haldol, and as PRN any time if side effects appear  Long-acting Haldol Decanoate injection can be started with a small dose 50 mg, however even small dose of Haldol may lead to beneficial effect of patient with chronic psychosis        Sincerely,        Stephen Wheatley MD        Total time spent in review of data, discussion with requesting provider and rendering advice was 11-20 Mins

## 2021-08-30 ENCOUNTER — TELEPHONE (OUTPATIENT)
Dept: FAMILY MEDICINE CLINIC | Facility: CLINIC | Age: 31
End: 2021-08-30

## 2021-08-31 ENCOUNTER — TELEPHONE (OUTPATIENT)
Dept: FAMILY MEDICINE CLINIC | Facility: CLINIC | Age: 31
End: 2021-08-31

## 2021-08-31 DIAGNOSIS — F20.0 PARANOID SCHIZOPHRENIA (HCC): Primary | ICD-10-CM

## 2021-08-31 RX ORDER — HALOPERIDOL DECANOATE 50 MG/ML
50 INJECTION INTRAMUSCULAR
Status: SHIPPED | OUTPATIENT
Start: 2021-09-01

## 2021-08-31 RX ORDER — BENZTROPINE MESYLATE 0.5 MG/1
0.5 TABLET ORAL 2 TIMES DAILY
Qty: 90 TABLET | Refills: 3 | Status: SHIPPED | OUTPATIENT
Start: 2021-08-31 | End: 2022-02-02 | Stop reason: SDUPTHER

## 2021-08-31 RX ORDER — DIPHENHYDRAMINE HCL 25 MG
25 TABLET ORAL
Qty: 30 TABLET | Refills: 0 | Status: SHIPPED | OUTPATIENT
Start: 2021-08-31 | End: 2022-02-02 | Stop reason: SDUPTHER

## 2021-08-31 NOTE — TELEPHONE ENCOUNTER
Noted    Just let your clinical person know to get you the medication  Kylie Raza placed it in the locked med room

## 2021-09-01 ENCOUNTER — CLINICAL SUPPORT (OUTPATIENT)
Dept: FAMILY MEDICINE CLINIC | Facility: CLINIC | Age: 31
End: 2021-09-01

## 2021-09-01 ENCOUNTER — TELEPHONE (OUTPATIENT)
Dept: FAMILY MEDICINE CLINIC | Facility: CLINIC | Age: 31
End: 2021-09-01

## 2021-09-01 ENCOUNTER — TELEPHONE (OUTPATIENT)
Dept: PSYCHIATRY | Facility: CLINIC | Age: 31
End: 2021-09-01

## 2021-09-01 VITALS
RESPIRATION RATE: 18 BRPM | OXYGEN SATURATION: 98 % | TEMPERATURE: 98.6 F | HEART RATE: 107 BPM | SYSTOLIC BLOOD PRESSURE: 124 MMHG | DIASTOLIC BLOOD PRESSURE: 72 MMHG

## 2021-09-01 DIAGNOSIS — Z76.89 ENCOUNTER FOR MEDICATION ADMINISTRATION: Primary | ICD-10-CM

## 2021-09-02 ENCOUNTER — PATIENT OUTREACH (OUTPATIENT)
Dept: FAMILY MEDICINE CLINIC | Facility: CLINIC | Age: 31
End: 2021-09-02

## 2021-09-02 NOTE — PROGRESS NOTES
MALDONADO SCHREIBER received a referral from patient's PCP regarding patient's mental health and need for therapy and psychiatry  MALDONADO SCHREIBER contacted patient's mother to assess: communicated with patient's mother in her native language, [place]  Patient's mother states patient's mental health has been very difficult for her to manage  States he has been hospitalized many times, has acted out with aggressive behaviors, and has caused family members to be fearful of him  She explained in the past he did not allow her to get him help, however, he is now more open to the idea of therapy/psychiatry  Patient's mother requested that services be provided with an agency that has [place] speaking therapist so that she can be involved as well  Patient's mother agreeable to MALDONADO SCHREIBER helping establish with [facility]  Patient's mother also states that to her knowledge, patient does not have health insurance  MALDONADO SCHREIBER looked into guarantor snapshot which states that patient is currently active with [facility] (verified on 9/1/21)  MALDONADO SCHREIBER explained that MALDONADO SCHREIBER will also have [facility] verify insurance when calling to schedule intake  ---    MALDONADO SCRHEIBER contacted [facility] and provided the information for intake process  MALDONADO SCHREIBER was informed that insurance will be verified and then patient's mother will be contacted directly to set up an appointment

## 2021-09-29 ENCOUNTER — OFFICE VISIT (OUTPATIENT)
Dept: FAMILY MEDICINE CLINIC | Facility: CLINIC | Age: 31
End: 2021-09-29

## 2021-09-29 VITALS
TEMPERATURE: 98.4 F | OXYGEN SATURATION: 97 % | DIASTOLIC BLOOD PRESSURE: 68 MMHG | WEIGHT: 153.4 LBS | HEART RATE: 83 BPM | RESPIRATION RATE: 18 BRPM | SYSTOLIC BLOOD PRESSURE: 104 MMHG | HEIGHT: 70 IN | BODY MASS INDEX: 21.96 KG/M2

## 2021-09-29 DIAGNOSIS — F20.0 PARANOID SCHIZOPHRENIA (HCC): Primary | Chronic | ICD-10-CM

## 2021-09-29 PROCEDURE — 99213 OFFICE O/P EST LOW 20 MIN: CPT | Performed by: FAMILY MEDICINE

## 2021-09-29 NOTE — PROGRESS NOTES
Assessment/Plan:    Paranoid schizophrenia ([facility] [address] )  Patient has disorganized behavior but denies hallucinations, SI  Following with psychiatry however mother reports some insurance barriers she is trying to sort through  Advised mother to f/u with our office if she requires another referral to [facility] psychiatry  As per chart review, previous provider ordered Haldol injection however pharmacy never received prescription as it was ordered as an in-clinic administered medication  Will re-order Haldol 50 mg Q28 days and have asked mother to book a nursing visit once she has  her son's prescription  Provider to administer injection at f/u visit  Diagnoses and all orders for this visit:    Paranoid schizophrenia ([facility] [address] )  -     haloperidol decanoate (Haldol Decanoate) 50 mg/mL injection; Inject 1 mL (50 mg total) into a muscle every 28 days          Subjective:      Patient ID: Ailyn Keys is a 36 y o  male  HPI     Patient is here for follow up for schizophrenia  The following portions of the patient's history were reviewed and updated as appropriate: allergies, current medications, past family history, past medical history, past social history, past surgical history and problem list     Review of Systems   Constitutional: Negative for activity change, chills, fatigue and fever  Respiratory: Negative for shortness of breath  Cardiovascular: Negative for chest pain  Endocrine: Negative for polydipsia  Neurological: Negative for dizziness, tremors, seizures and headaches  Psychiatric/Behavioral: Positive for behavioral problems  Negative for hallucinations, self-injury, sleep disturbance and suicidal ideas  Objective:    /68 (BP Location: Left arm, Patient Position: Sitting, Cuff Size: Standard)   Pulse 83   Temp 98 4 °F (36 9 °C) (Temporal)   Resp 18   Ht 5' 10 25" (1 784 m)   Wt 69 6 kg (153 lb 6 4 oz)   SpO2 97%   BMI 21 85 kg/m²      Physical Exam  Vitals reviewed  Constitutional:       General: He is not in acute distress  Neck:      Thyroid: No thyromegaly  Trachea: No tracheal deviation  Cardiovascular:      Rate and Rhythm: Normal rate and regular rhythm  Heart sounds: Normal heart sounds  No murmur heard  Pulmonary:      Effort: Pulmonary effort is normal       Breath sounds: Normal breath sounds  Abdominal:      General: Bowel sounds are normal       Palpations: Abdomen is soft  Musculoskeletal:      Cervical back: Normal range of motion and neck supple  Lymphadenopathy:      Cervical: No cervical adenopathy  Skin:     General: Skin is warm  Capillary Refill: Capillary refill takes less than 2 seconds  Neurological:      Mental Status: He is alert and oriented to person, place, and time     Psychiatric:      Comments: Disorganized behavior

## 2021-09-29 NOTE — ASSESSMENT & PLAN NOTE
Patient has disorganized behavior but denies hallucinations, SI  Following with psychiatry however mother reports some insurance barriers she is trying to sort through  Advised mother to f/u with our office if she requires another referral to [facility] psychiatry  As per chart review, previous provider ordered Haldol injection however pharmacy never received prescription as it was ordered as an in-clinic administered medication  Will re-order Haldol 50 mg Q28 days and have asked mother to book a nursing visit once she has  her son's prescription  Provider to administer injection at f/u visit

## 2021-09-30 RX ORDER — HALOPERIDOL DECANOATE 50 MG/ML
50 INJECTION INTRAMUSCULAR
Qty: 1 ML | Refills: 0 | Status: SHIPPED | OUTPATIENT
Start: 2021-09-30 | End: 2021-10-27 | Stop reason: SDUPTHER

## 2021-10-04 ENCOUNTER — PATIENT OUTREACH (OUTPATIENT)
Dept: FAMILY MEDICINE CLINIC | Facility: CLINIC | Age: 31
End: 2021-10-04

## 2021-10-04 ENCOUNTER — OFFICE VISIT (OUTPATIENT)
Dept: FAMILY MEDICINE CLINIC | Facility: CLINIC | Age: 31
End: 2021-10-04

## 2021-10-04 VITALS
TEMPERATURE: 97.5 F | HEIGHT: 70 IN | RESPIRATION RATE: 16 BRPM | BODY MASS INDEX: 21.23 KG/M2 | SYSTOLIC BLOOD PRESSURE: 110 MMHG | HEART RATE: 74 BPM | DIASTOLIC BLOOD PRESSURE: 74 MMHG | WEIGHT: 148.3 LBS | OXYGEN SATURATION: 98 %

## 2021-10-04 DIAGNOSIS — F20.0 PARANOID SCHIZOPHRENIA (HCC): Primary | Chronic | ICD-10-CM

## 2021-10-04 PROCEDURE — 99213 OFFICE O/P EST LOW 20 MIN: CPT | Performed by: FAMILY MEDICINE

## 2021-10-26 ENCOUNTER — TELEPHONE (OUTPATIENT)
Dept: FAMILY MEDICINE CLINIC | Facility: CLINIC | Age: 31
End: 2021-10-26

## 2021-10-27 DIAGNOSIS — F20.0 PARANOID SCHIZOPHRENIA (HCC): Chronic | ICD-10-CM

## 2021-10-30 RX ORDER — HALOPERIDOL DECANOATE 50 MG/ML
50 INJECTION INTRAMUSCULAR
Qty: 1 ML | Refills: 0 | Status: SHIPPED | OUTPATIENT
Start: 2021-10-30 | End: 2021-12-01 | Stop reason: SDUPTHER

## 2021-11-02 ENCOUNTER — TELEPHONE (OUTPATIENT)
Dept: FAMILY MEDICINE CLINIC | Facility: CLINIC | Age: 31
End: 2021-11-02

## 2021-11-02 ENCOUNTER — PROCEDURE VISIT (OUTPATIENT)
Dept: FAMILY MEDICINE CLINIC | Facility: CLINIC | Age: 31
End: 2021-11-02

## 2021-11-02 VITALS
SYSTOLIC BLOOD PRESSURE: 104 MMHG | OXYGEN SATURATION: 99 % | RESPIRATION RATE: 16 BRPM | HEART RATE: 66 BPM | TEMPERATURE: 98 F | BODY MASS INDEX: 22.54 KG/M2 | DIASTOLIC BLOOD PRESSURE: 68 MMHG | WEIGHT: 157.4 LBS | HEIGHT: 70 IN

## 2021-11-02 DIAGNOSIS — F20.0 PARANOID SCHIZOPHRENIA (HCC): Primary | Chronic | ICD-10-CM

## 2021-11-02 PROCEDURE — 99213 OFFICE O/P EST LOW 20 MIN: CPT | Performed by: FAMILY MEDICINE

## 2021-12-01 ENCOUNTER — TELEPHONE (OUTPATIENT)
Dept: FAMILY MEDICINE CLINIC | Facility: CLINIC | Age: 31
End: 2021-12-01

## 2021-12-01 ENCOUNTER — OFFICE VISIT (OUTPATIENT)
Dept: FAMILY MEDICINE CLINIC | Facility: CLINIC | Age: 31
End: 2021-12-01

## 2021-12-01 VITALS
SYSTOLIC BLOOD PRESSURE: 110 MMHG | DIASTOLIC BLOOD PRESSURE: 70 MMHG | OXYGEN SATURATION: 98 % | HEART RATE: 73 BPM | BODY MASS INDEX: 23.19 KG/M2 | HEIGHT: 70 IN | WEIGHT: 162 LBS | RESPIRATION RATE: 16 BRPM | TEMPERATURE: 98 F

## 2021-12-01 DIAGNOSIS — F20.0 PARANOID SCHIZOPHRENIA (HCC): Chronic | ICD-10-CM

## 2021-12-01 DIAGNOSIS — Z00.00 ANNUAL PHYSICAL EXAM: Primary | ICD-10-CM

## 2021-12-01 DIAGNOSIS — F17.200 TOBACCO DEPENDENCE: ICD-10-CM

## 2021-12-01 PROCEDURE — 99395 PREV VISIT EST AGE 18-39: CPT | Performed by: FAMILY MEDICINE

## 2021-12-01 RX ORDER — HALOPERIDOL DECANOATE 50 MG/ML
50 INJECTION INTRAMUSCULAR
Qty: 1 ML | Refills: 0 | Status: SHIPPED | OUTPATIENT
Start: 2021-12-01 | End: 2021-12-29

## 2021-12-02 PROBLEM — F17.200 TOBACCO DEPENDENCE: Status: ACTIVE | Noted: 2021-12-02

## 2021-12-02 PROBLEM — Z00.00 HEALTHCARE MAINTENANCE: Status: ACTIVE | Noted: 2021-12-02

## 2021-12-15 ENCOUNTER — APPOINTMENT (EMERGENCY)
Dept: RADIOLOGY | Facility: HOSPITAL | Age: 31
End: 2021-12-15

## 2021-12-15 ENCOUNTER — HOSPITAL ENCOUNTER (EMERGENCY)
Facility: HOSPITAL | Age: 31
Discharge: HOME | End: 2021-12-15
Attending: EMERGENCY MEDICINE | Admitting: EMERGENCY MEDICINE

## 2021-12-15 VITALS
HEIGHT: 70 IN | DIASTOLIC BLOOD PRESSURE: 79 MMHG | WEIGHT: 155 LBS | OXYGEN SATURATION: 97 % | TEMPERATURE: 97.6 F | SYSTOLIC BLOOD PRESSURE: 123 MMHG | RESPIRATION RATE: 18 BRPM | BODY MASS INDEX: 22.19 KG/M2 | HEART RATE: 102 BPM

## 2021-12-15 DIAGNOSIS — T58.91XA CARBON MONOXIDE POISONING: Primary | ICD-10-CM

## 2021-12-15 LAB — GAS + CO PNL BLDA: 15.9 % (ref 0–1.5)

## 2021-12-15 PROCEDURE — 99284 EMERGENCY DEPT VISIT MOD MDM: CPT | Performed by: EMERGENCY MEDICINE

## 2021-12-15 PROCEDURE — 70450 CT HEAD/BRAIN W/O DYE: CPT

## 2021-12-15 PROCEDURE — 82375 ASSAY CARBOXYHB QUANT: CPT | Performed by: EMERGENCY MEDICINE

## 2021-12-15 PROCEDURE — 99284 EMERGENCY DEPT VISIT MOD MDM: CPT

## 2021-12-15 PROCEDURE — 36415 COLL VENOUS BLD VENIPUNCTURE: CPT | Performed by: EMERGENCY MEDICINE

## 2021-12-15 RX ORDER — ACETAMINOPHEN 325 MG/1
975 TABLET ORAL ONCE
Status: COMPLETED | OUTPATIENT
Start: 2021-12-15 | End: 2021-12-15

## 2021-12-15 RX ADMIN — ACETAMINOPHEN 975 MG: 325 TABLET, FILM COATED ORAL at 15:12

## 2021-12-28 DIAGNOSIS — F20.0 PARANOID SCHIZOPHRENIA (HCC): Chronic | ICD-10-CM

## 2021-12-29 RX ORDER — HALOPERIDOL DECANOATE 50 MG/ML
INJECTION INTRAMUSCULAR
Qty: 1 ML | Refills: 0 | Status: SHIPPED | OUTPATIENT
Start: 2021-12-29 | End: 2022-01-31

## 2021-12-30 ENCOUNTER — CLINICAL SUPPORT (OUTPATIENT)
Dept: FAMILY MEDICINE CLINIC | Facility: CLINIC | Age: 31
End: 2021-12-30

## 2021-12-30 DIAGNOSIS — F20.0 PARANOID SCHIZOPHRENIA (HCC): Primary | ICD-10-CM

## 2021-12-30 PROCEDURE — 96372 THER/PROPH/DIAG INJ SC/IM: CPT | Performed by: FAMILY MEDICINE

## 2022-01-31 DIAGNOSIS — F20.0 PARANOID SCHIZOPHRENIA (HCC): Chronic | ICD-10-CM

## 2022-01-31 RX ORDER — HALOPERIDOL DECANOATE 50 MG/ML
INJECTION INTRAMUSCULAR
Qty: 1 ML | Refills: 0 | Status: SHIPPED | OUTPATIENT
Start: 2022-01-31 | End: 2022-02-02 | Stop reason: SDUPTHER

## 2022-02-02 ENCOUNTER — OFFICE VISIT (OUTPATIENT)
Dept: FAMILY MEDICINE CLINIC | Facility: CLINIC | Age: 32
End: 2022-02-02

## 2022-02-02 VITALS
RESPIRATION RATE: 18 BRPM | OXYGEN SATURATION: 97 % | DIASTOLIC BLOOD PRESSURE: 80 MMHG | TEMPERATURE: 98.3 F | HEART RATE: 81 BPM | BODY MASS INDEX: 25.31 KG/M2 | WEIGHT: 176.8 LBS | SYSTOLIC BLOOD PRESSURE: 112 MMHG | HEIGHT: 70 IN

## 2022-02-02 DIAGNOSIS — F20.0 PARANOID SCHIZOPHRENIA (HCC): Primary | Chronic | ICD-10-CM

## 2022-02-02 PROCEDURE — 99213 OFFICE O/P EST LOW 20 MIN: CPT | Performed by: FAMILY MEDICINE

## 2022-02-02 RX ORDER — DIPHENHYDRAMINE HCL 25 MG
25 TABLET ORAL
Qty: 30 TABLET | Refills: 0 | Status: SHIPPED | OUTPATIENT
Start: 2022-02-02

## 2022-02-02 RX ORDER — BENZTROPINE MESYLATE 0.5 MG/1
0.5 TABLET ORAL 2 TIMES DAILY
Qty: 90 TABLET | Refills: 3 | Status: SHIPPED | OUTPATIENT
Start: 2022-02-02

## 2022-02-02 RX ORDER — HALOPERIDOL DECANOATE 50 MG/ML
50 INJECTION INTRAMUSCULAR
Qty: 1 ML | Refills: 5 | Status: SHIPPED | OUTPATIENT
Start: 2022-02-02

## 2022-02-02 NOTE — ASSESSMENT & PLAN NOTE
Haldol administered, well tolerated  Continued to reiterate the importance of taking cogentin and or benadryl  Patient expressed understanding  Mother states she will  the medications from the pharmacy  Will continue his psychiatric care for now    Follow up for haldol injection in a month

## 2022-02-02 NOTE — PROGRESS NOTES
Assessment/Plan:    Paranoid schizophrenia ([facility] )  Haldol administered, well tolerated  Continued to reiterate the importance of taking cogentin and or benadryl  Patient expressed understanding  Mother states she will  the medications from the pharmacy  Will continue his psychiatric care for now  Follow up for haldol injection in a month        Diagnoses and all orders for this visit:    Paranoid schizophrenia ([facility] )          Subjective:      Patient ID: Carlotta Shah is a 36 y o  male  HPI    Patient presents for follow up for haldol injection in the setting of schizophrenia  He is doing "alright", states he has not been taking cogentin or benadryl as prescribed  He denies SI/HI/AVH  He is accompanied by his mother  The following portions of the patient's history were reviewed and updated as appropriate: allergies, current medications, past family history, past medical history, past social history, past surgical history and problem list     Review of Systems   Constitutional: Negative for fatigue and fever  HENT: Negative for congestion, ear pain and sinus pain  Respiratory: Negative for cough and shortness of breath  Cardiovascular: Negative for chest pain  Gastrointestinal: Negative for abdominal pain  Genitourinary: Negative for dysuria  Musculoskeletal: Negative for joint swelling  Skin: Negative for rash  Neurological: Negative for dizziness and headaches  Psychiatric/Behavioral: Negative for hallucinations  Objective:    /80 (BP Location: Left arm, Patient Position: Sitting, Cuff Size: Standard)   Pulse 81   Temp 98 3 °F (36 8 °C) (Temporal)   Resp 18   Ht 5' 10" (1 778 m)   Wt 80 2 kg (176 lb 12 8 oz)   SpO2 97%   BMI 25 37 kg/m²      Physical Exam  Constitutional:       General: He is not in acute distress  Appearance: Normal appearance  Cardiovascular:      Rate and Rhythm: Normal rate and regular rhythm  Pulses: Normal pulses  Heart sounds: Normal heart sounds  Pulmonary:      Effort: Pulmonary effort is normal  No respiratory distress  Breath sounds: Normal breath sounds  Abdominal:      General: Bowel sounds are normal       Palpations: Abdomen is soft  Musculoskeletal:         General: No swelling or tenderness  Right lower leg: No edema  Left lower leg: No edema  Lymphadenopathy:      Cervical: No cervical adenopathy  Skin:     General: Skin is warm  Neurological:      General: No focal deficit present  Mental Status: He is alert and oriented to person, place, and time

## 2022-02-04 ENCOUNTER — OFFICE VISIT (OUTPATIENT)
Dept: CARDIOLOGY CLINIC | Facility: CLINIC | Age: 32
End: 2022-02-04

## 2022-02-04 VITALS — WEIGHT: 177.5 LBS | BODY MASS INDEX: 25.47 KG/M2 | DIASTOLIC BLOOD PRESSURE: 80 MMHG | SYSTOLIC BLOOD PRESSURE: 120 MMHG

## 2022-02-04 DIAGNOSIS — R07.9 CHEST PAIN, UNSPECIFIED TYPE: ICD-10-CM

## 2022-02-04 DIAGNOSIS — F17.200 TOBACCO DEPENDENCE: Primary | ICD-10-CM

## 2022-02-04 PROCEDURE — 99244 OFF/OP CNSLTJ NEW/EST MOD 40: CPT | Performed by: INTERNAL MEDICINE

## 2022-02-04 PROCEDURE — 93000 ELECTROCARDIOGRAM COMPLETE: CPT | Performed by: INTERNAL MEDICINE

## 2022-02-04 NOTE — PROGRESS NOTES
Cardiology Consultation     Kasie Henley  [id number]  1990  HEART [facility]ASCULAR [facility] [place] [id number] [place] [id number]  1  Tobacco dependence     2  Chest pain, unspecified type  POCT ECG     Patient Active Problem List   Diagnosis    [facility]anoid schizophrenia ([facility] )    Transaminitis   [address] maintenance    Tobacco dependence       HPI patient is here for a cardiology evaluation  Patient has schizophrenia  Lipid profile done August 2021 demonstrated total cholesterol of 142 with an HDL 49 and a calculated LDL of 79  Patient today has no complaint of chest discomfort  He was of issues with his head  He felt that there was something in his head which may be controlling his chest   I spoke to him with the assistance of an   He denied any cardiac history  He said there is no history of heart disease in his family  He denies hyperlipidemia, diabetes or hypertension  His EKG today demonstrates sinus rhythm and is a normal tracing  PMH-  Past Medical History:   Diagnosis Date    Disease of thyroid gland     Hallucination     Psychiatric disorder     Psychiatric illness     Schizophrenia ([facility] )         SOCIAL HISTORY-  Social History     Socioeconomic History    Marital status: Single     Spouse name: Not on file    Number of children: Not on file    Years of education: Not on file    Highest education level: Not on file   Occupational History    Not on file   Tobacco Use    Smoking status: Current Every Day Smoker     Packs/day: 0 50     Types: Cigarettes    Smokeless tobacco: Never Used   Vaping Use    Vaping Use: Never used   Substance and Sexual Activity    Alcohol use:  Yes     Alcohol/week: 7 0 - 10 0 standard drinks     Types: 7 - 10 Cans of beer per week     Comment: Socially    Drug use: No     Comment: pt refused to answer     Sexual activity: Not on file   Other Topics Concern    Not on file   Social History Narrative    Not on file     Social Determinants of Health     Financial Resource Strain: Unknown    Difficulty of Paying Living Expenses: Patient refused   Food Insecurity: Unknown    Worried About Running Out of Food in the Last Year: Patient refused    Ran Out of Food in the Last Year: Patient refused   Transportation Needs: Unknown    Lack of Transportation (Medical): Patient refused    Lack of Transportation (Non-Medical): Patient refused   Physical Activity: Not on file   Stress: Not on file   Social Connections: Not on file   Intimate Partner Violence: Not on file   Housing Stability: Not on file        FAMILY HISTORY-  Family History   Family history unknown: Yes       SURGICAL HISTORY-  No past surgical history on file  Current Outpatient Medications:     benztropine (COGENTIN) 0 5 mg tablet, Take 1 tablet (0 5 mg total) by mouth 2 (two) times a day, Disp: 90 tablet, Rfl: 3    diphenhydrAMINE (BENADRYL) 25 mg tablet, Take 1 tablet (25 mg total) by mouth daily at bedtime as needed (If EPS symptoms), Disp: 30 tablet, Rfl: 0    haloperidol decanoate (Haldol Decanoate) 50 mg/mL injection, Inject 1 mL (50 mg total) into a muscle every 28 days, Disp: 1 mL, Rfl: 5    Current Facility-Administered Medications:     haloperidol decanoate (Haldol Decanoate) LONG-ACTING IM injection 50 mg, 50 mg, Intramuscular, Q28 Days, Vasyl Rebollar MD  No Known Allergies  Vitals:    02/04/22 1456   BP: 120/80   BP Location: Right arm   Patient Position: Sitting   Cuff Size: Standard   Weight: 80 5 kg (177 lb 8 oz)         Review of Systems:  Review of Systems   All other systems reviewed and are negative  Physical Exam:  Physical Exam  Vitals reviewed  Constitutional:       Appearance: He is well-developed  HENT:      Head: Normocephalic and atraumatic  Eyes:      Conjunctiva/sclera: Conjunctivae normal       Pupils: Pupils are equal, round, and reactive to light  Cardiovascular:      Rate and Rhythm: Normal rate  Heart sounds: Normal heart sounds  Pulmonary:      Effort: Pulmonary effort is normal       Breath sounds: Normal breath sounds  Musculoskeletal:      Cervical back: Normal range of motion and neck supple  Skin:     General: Skin is warm and dry  Neurological:      Mental Status: He is alert and oriented to person, place, and time  Discussion/Summary:  Patient appears stable from a cardiac point of view  He has no complaint of chest pain today  His EKG is normal   I asked him to call if I can be of assistance in the future otherwise I will see him as needed going forward

## 2022-02-10 ENCOUNTER — HOSPITAL ENCOUNTER (EMERGENCY)
Facility: HOSPITAL | Age: 32
Discharge: HOME | End: 2022-02-10
Attending: EMERGENCY MEDICINE | Admitting: EMERGENCY MEDICINE

## 2022-02-10 VITALS
HEART RATE: 106 BPM | TEMPERATURE: 97.8 F | OXYGEN SATURATION: 95 % | WEIGHT: 177.69 LBS | BODY MASS INDEX: 25.5 KG/M2 | DIASTOLIC BLOOD PRESSURE: 86 MMHG | RESPIRATION RATE: 18 BRPM | SYSTOLIC BLOOD PRESSURE: 140 MMHG

## 2022-02-10 DIAGNOSIS — R51.9 HEADACHE: Primary | ICD-10-CM

## 2022-02-10 PROCEDURE — 99282 EMERGENCY DEPT VISIT SF MDM: CPT | Performed by: EMERGENCY MEDICINE

## 2022-02-10 PROCEDURE — 99283 EMERGENCY DEPT VISIT LOW MDM: CPT

## 2022-02-16 NOTE — ED PROVIDER NOTES
History  Chief Complaint   Patient presents with    Headache       History provided by:  Patient  Headache  Pain location:  Generalized  Radiates to:  Does not radiate  Onset quality:  Gradual  Timing:  Intermittent  Relieved by:  Nothing  Worsened by:  Nothing  Ineffective treatments:  None tried  Associated symptoms: no abdominal pain, no cough, no diarrhea, no dizziness, no eye pain, no fever, no myalgias, no nausea, no neck stiffness, no numbness, no sore throat and no weakness        Prior to Admission Medications   Prescriptions Last Dose Informant Patient Reported? Taking?   benztropine (COGENTIN) 0 5 mg tablet  Self No No   Sig: Take 1 tablet (0 5 mg total) by mouth 2 (two) times a day   diphenhydrAMINE (BENADRYL) 25 mg tablet  Self No No   Sig: Take 1 tablet (25 mg total) by mouth daily at bedtime as needed (If EPS symptoms)   haloperidol decanoate (Haldol Decanoate) 50 mg/mL injection  Self No No   Sig: Inject 1 mL (50 mg total) into a muscle every 28 days      Facility-Administered Medications Last Administration Doses Remaining   haloperidol decanoate (Haldol Decanoate) LONG-ACTING IM injection 50 mg None recorded           Past Medical History:   Diagnosis Date    Disease of thyroid gland     Hallucination     Psychiatric disorder     Psychiatric illness     Schizophrenia ([facility] )        History reviewed  No pertinent surgical history  Family History   Family history unknown: Yes     I have reviewed and agree with the history as documented  E-Cigarette/Vaping    E-Cigarette Use Never User      E-Cigarette/Vaping Substances     Social History     Tobacco Use    Smoking status: Current Every Day Smoker     Packs/day: 0 50     Types: Cigarettes    Smokeless tobacco: Never Used   Vaping Use    Vaping Use: Never used   Substance Use Topics    Alcohol use:  Yes     Alcohol/week: 7 0 - 10 0 standard drinks     Types: 7 - 10 Cans of beer per week     Comment: Socially    Drug use: No     Comment: pt refused to answer        Review of Systems   Constitutional: Negative for chills and fever  HENT: Negative for rhinorrhea, sore throat and trouble swallowing  Eyes: Negative for pain  Respiratory: Negative for cough, shortness of breath, wheezing and stridor  Cardiovascular: Negative for chest pain and leg swelling  Gastrointestinal: Negative for abdominal pain, diarrhea and nausea  Endocrine: Negative for polyuria  Genitourinary: Negative for dysuria, flank pain and urgency  Musculoskeletal: Negative for joint swelling, myalgias and neck stiffness  Skin: Negative for rash  Allergic/Immunologic: Negative for immunocompromised state  Neurological: Positive for headaches  Negative for dizziness, syncope, weakness and numbness  Psychiatric/Behavioral: Negative for confusion and suicidal ideas  All other systems reviewed and are negative  Physical Exam  Physical Exam  Vitals and nursing note reviewed  Constitutional:       Appearance: He is well-developed  HENT:      Head: Normocephalic and atraumatic  Eyes:      Pupils: Pupils are equal, round, and reactive to light  Cardiovascular:      Rate and Rhythm: Normal rate and regular rhythm  Heart sounds: Normal heart sounds  No murmur heard  No friction rub  Pulmonary:      Effort: Pulmonary effort is normal  No respiratory distress  Breath sounds: No wheezing or rales  Abdominal:      General: Bowel sounds are normal       Palpations: Abdomen is soft  There is no mass  Tenderness: There is no abdominal tenderness  There is no guarding  Musculoskeletal:      Cervical back: Normal range of motion and neck supple  Skin:     General: Skin is warm  Findings: No rash  Neurological:      Mental Status: He is alert and oriented to person, place, and time  Comments: GCS 15  AAOx4  No focal neuro deficits  CN II-XII intact  PERRL  EOMI  [place] No pronator drift   strength 5/5 bilaterally   B/L UE strength 5/5 throughout  Finger to nose normal  Cerebellar function normal  Ambulates without difficulty  B/L LE strength 5/5 throughout  Gross sensation to b/l upper and lower extremities intact  Vital Signs  ED Triage Vitals [02/10/22 1136]   Temperature Pulse Respirations Blood Pressure SpO2   97 8 °F (36 6 °C) (!) 106 18 140/86 95 %      Temp Source Heart Rate Source Patient Position - Orthostatic VS BP Location FiO2 (%)   Oral Monitor Standing Right arm --      Pain Score       10 - Worst Possible Pain           Vitals:    02/10/22 1136   BP: 140/86   Pulse: (!) 106   Patient Position - Orthostatic VS: Standing         Visual Acuity      ED Medications  Medications - No data to display    Diagnostic Studies  Results Reviewed     None                 No orders to display              Procedures  Procedures         ED Course  ED Course as of 02/16/22 0734   Thu Feb 10, 2022   1142 Informed patient no staples in head  MDM  Number of Diagnoses or Management Options  Headache: new and requires workup  Diagnosis management comments: Headache was not acute or maximal in onset  Headache similar to previous headaches  Do not suspect SAH, temporal arteritis, meningitis, encephalitis, CO poisoning, acute angle closure glaucoma, dural venous sinus thrombosis as cause of headache  Do not feel that further imaging or workup (including LP) are warranted at this time  Patient will follow up with PCP and return to ED if symptoms worsen or persist        Pt re-examined and evaluated after testing and treatment  Spoke with the patient and feeling improved and sxs have resolved  Will discharge home with close f/u with pcp and instructed to return to the ED if sxs worsen or continue  Pt agrees with the plan for discharge and feels comfortable to go home with proper f/u  Advised to return for worsening or additional problems  Diagnostic tests were reviewed and questions answered  Diagnosis, care plan and treatment options were discussed  The patient understand instructions and will follow up as directed  Counseling: I had a detailed discussion with the patient and/or guardian regarding: the historical points, exam findings, and any diagnostic results supporting the discharge diagnosis, lab results, radiology results, discharge instructions reviewed with patient and/or family/caregiver and understanding was verbalized  Instructions given to return to the emergency department if symptoms worsen or persist, or if there are any questions or concerns that arise at home  All labs reviewed and utilized in the medical decision making process    All radiology studies independently viewed by me and interpreted by the radiologist           Disposition  Final diagnoses:   Headache     Time reflects when diagnosis was documented in both MDM as applicable and the Disposition within this note     Time User Action Codes Description Comment    2/10/2022 11:42 AM Josianejarod Chen Add [R51 9] Headache       ED Disposition     ED Disposition Condition Date/Time Comment    Discharge Stable u Feb 10, 2022 11:42 AM Tiffanie Holland discharge to home/self care  Follow-up Information    None         Discharge Medication List as of 2/10/2022 12:14 PM      CONTINUE these medications which have NOT CHANGED    Details   benztropine (COGENTIN) 0 5 mg tablet Take 1 tablet (0 5 mg total) by mouth 2 (two) times a day, Starting Wed 2/2/2022, Normal      diphenhydrAMINE (BENADRYL) 25 mg tablet Take 1 tablet (25 mg total) by mouth daily at bedtime as needed (If EPS symptoms), Starting Wed 2/2/2022, Normal      haloperidol decanoate (Haldol Decanoate) 50 mg/mL injection Inject 1 mL (50 mg total) into a muscle every 28 days, Starting Wed 2/2/2022, Normal             No discharge procedures on file      PDMP Review     None          ED Provider  Electronically Signed by           Bari Blackwood DO  02/16/22 4162

## 2022-03-08 ENCOUNTER — APPOINTMENT (OUTPATIENT)
Dept: LAB | Facility: HOSPITAL | Age: 32
End: 2022-03-08

## 2022-03-08 ENCOUNTER — OFFICE VISIT (OUTPATIENT)
Dept: FAMILY MEDICINE CLINIC | Facility: CLINIC | Age: 32
End: 2022-03-08

## 2022-03-08 VITALS
DIASTOLIC BLOOD PRESSURE: 72 MMHG | SYSTOLIC BLOOD PRESSURE: 110 MMHG | BODY MASS INDEX: 25.11 KG/M2 | HEART RATE: 103 BPM | OXYGEN SATURATION: 98 % | RESPIRATION RATE: 19 BRPM | TEMPERATURE: 98.8 F | WEIGHT: 175 LBS

## 2022-03-08 DIAGNOSIS — F20.0 PARANOID SCHIZOPHRENIA (HCC): Primary | ICD-10-CM

## 2022-03-08 DIAGNOSIS — R79.81 CARBON DIOXIDE BLOOD INCREASED: ICD-10-CM

## 2022-03-08 DIAGNOSIS — R07.9 CHEST PAIN, UNSPECIFIED TYPE: ICD-10-CM

## 2022-03-08 LAB — GAS + CO PNL BLDA: 10.7 % (ref 0–1.5)

## 2022-03-08 PROCEDURE — 93000 ELECTROCARDIOGRAM COMPLETE: CPT | Performed by: FAMILY MEDICINE

## 2022-03-08 PROCEDURE — 99213 OFFICE O/P EST LOW 20 MIN: CPT | Performed by: FAMILY MEDICINE

## 2022-03-08 PROCEDURE — 82375 ASSAY CARBOXYHB QUANT: CPT

## 2022-03-08 PROCEDURE — 36415 COLL VENOUS BLD VENIPUNCTURE: CPT

## 2022-03-08 RX ORDER — HALOPERIDOL DECANOATE 50 MG/ML
50 INJECTION INTRAMUSCULAR ONCE
Status: COMPLETED | OUTPATIENT
Start: 2022-03-08 | End: 2022-03-08

## 2022-03-08 RX ADMIN — HALOPERIDOL DECANOATE 50 MG: 50 INJECTION INTRAMUSCULAR at 14:19

## 2022-03-08 NOTE — ASSESSMENT & PLAN NOTE
Has been receiving monthly Haldol   Patient complains of chest pain, off and on after taking Haldol  Continued to reiterate the importance of taking cogentin and or benadryl  Patient expressed understanding    Will continue his psychiatric care for now, should transition to psychiatrist   Follow up for haldol injection in a month  Plan:   - EKG - Normal Sinus Rhythm   - Haldol 50 mg administered, well tolerated  - Return in one month for next administration

## 2022-03-08 NOTE — PROGRESS NOTES
Assessment/Plan:    Paranoid schizophrenia ([facility] )  Has been receiving monthly Haldol   Patient complains of chest pain, off and on after taking Haldol  Continued to reiterate the importance of taking cogentin and or benadryl  Patient expressed understanding  Will continue his psychiatric care for now, should transition to psychiatrist   Follow up for haldol injection in a month  Plan:   - EKG - Normal Sinus Rhythm   - Haldol 50 mg administered, well tolerated  - Return in one month for next administration       Diagnoses and all orders for this visit:    Paranoid schizophrenia ([facility] )  -     haloperidol decanoate (Haldol Decanoate) LONG-ACTING IM injection 50 mg    Chest pain, unspecified type  -     POCT ECG    Carbon dioxide blood increased  Comments:  Previous carboxyhemoglobin was increased at 15  Patient's guardian would like a repeat  No labs found in result  Will repeat once  Orders:  -     Carboxyhemoglobin; Future          Subjective:      Patient ID: Monet Alvarado is a 36 y o  male  Patient is a 79-year-old male with past medical history paranoid schizophrenia  Patient has been receiving Haldol injections monthly  Patient reports that since getting Haldol injections he has been getting chest pain generalized substernal periodically without any associated symptoms  Patient cannot pinpoint when he gets these or how they feel  His mother and guardian is with him currently and reports that he has not complained of any chest pain  Mother would also like to do carbon monoxide level as previous carbon monoxide level that she had done at another facility was high, above 15  The following portions of the patient's history were reviewed and updated as appropriate: allergies, current medications, past family history, past medical history, past social history, past surgical history and problem list     Review of Systems   Constitutional: Negative for chills and fever     HENT: Negative for ear pain and sore throat  Eyes: Negative for pain and visual disturbance  Respiratory: Negative for cough and shortness of breath  Cardiovascular: Positive for chest pain (periodically )  Negative for palpitations  Gastrointestinal: Negative for abdominal pain and vomiting  Genitourinary: Negative for dysuria and hematuria  Musculoskeletal: Negative for arthralgias and back pain  Skin: Negative for color change and rash  Neurological: Negative for seizures and syncope  All other systems reviewed and are negative  Objective:  /72 (BP Location: Left arm, Patient Position: Sitting, Cuff Size: Adult)   Pulse 103   Temp 98 8 °F (37 1 °C) (Temporal)   Resp 19   Wt 79 4 kg (175 lb)   SpO2 98%   BMI 25 11 kg/m²      Physical Exam  Constitutional:       Appearance: Normal appearance  HENT:      Head: Normocephalic and atraumatic  Mouth/Throat:      Mouth: Mucous membranes are moist       Pharynx: Oropharynx is clear  Cardiovascular:      Rate and Rhythm: Normal rate and regular rhythm  Pulses: Normal pulses  Heart sounds: Normal heart sounds  Pulmonary:      Effort: Pulmonary effort is normal       Breath sounds: Normal breath sounds  Chest:      Chest wall: No tenderness  Abdominal:      General: Bowel sounds are normal       Palpations: Abdomen is soft  Skin:     General: Skin is warm  Neurological:      Mental Status: He is alert

## 2022-04-11 ENCOUNTER — OFFICE VISIT (OUTPATIENT)
Dept: FAMILY MEDICINE CLINIC | Facility: CLINIC | Age: 32
End: 2022-04-11

## 2022-04-11 VITALS
TEMPERATURE: 98.6 F | HEART RATE: 88 BPM | OXYGEN SATURATION: 98 % | RESPIRATION RATE: 18 BRPM | WEIGHT: 180 LBS | HEIGHT: 71 IN | SYSTOLIC BLOOD PRESSURE: 118 MMHG | BODY MASS INDEX: 25.2 KG/M2 | DIASTOLIC BLOOD PRESSURE: 74 MMHG

## 2022-04-11 DIAGNOSIS — Z78.9 NEED FOR FOLLOW-UP BY SOCIAL WORKER: Primary | ICD-10-CM

## 2022-04-11 DIAGNOSIS — F20.0 PARANOID SCHIZOPHRENIA (HCC): Chronic | ICD-10-CM

## 2022-04-11 PROCEDURE — 99213 OFFICE O/P EST LOW 20 MIN: CPT | Performed by: FAMILY MEDICINE

## 2022-04-11 NOTE — PROGRESS NOTES
Assessment/Plan:    Paranoid schizophrenia ([facility] )  Haldol administered, well tolerated  Continued to reiterate the importance of taking cogentin and or benadryl  Patient expressed understanding  Will continue his psychiatric care for now as patient unable to get psychiatric care due to undocumented status  Follow up for haldol injection in a month    Need for follow-up by   Social work follow up to provide resources for job search given undocumented status       Diagnoses and all orders for this visit:    Need for follow-up by   -     Ambulatory Referral to Social Work Care Management Program; Future    Paranoid schizophrenia ([facility] )          Subjective:      Patient ID: Sb Lewis is a 36 y o  male  HPI      Patient presents for follow up for haldol injection in the setting of schizophrenia  He is doing well  We discussed the importance of incorporating self into the functional society; he states he is trying to find a job but not successful yet  He denies SI/HI/AVH  He is accompanied by his mother  The following portions of the patient's history were reviewed and updated as appropriate: allergies, current medications, past family history, past medical history, past social history, past surgical history and problem list     Review of Systems   Constitutional: Negative for fatigue and fever  HENT: Negative for congestion, ear pain and sinus pain  Respiratory: Negative for cough and shortness of breath  Cardiovascular: Negative for chest pain  Gastrointestinal: Negative for abdominal pain  Genitourinary: Negative for dysuria  Musculoskeletal: Negative for joint swelling  Skin: Negative for rash  Neurological: Negative for dizziness and headaches  Psychiatric/Behavioral: Negative for hallucinations           Objective:    /74 (BP Location: Left arm, Patient Position: Sitting, Cuff Size: Standard)   Pulse 88   Temp 98 6 °F (37 °C) (Temporal)   Resp 18  5' 11" (1 803 m)   Wt 81 6 kg (180 lb)   SpO2 98%   BMI 25 10 kg/m²      Physical Exam  Constitutional:       General: He is not in acute distress  Appearance: Normal appearance  Cardiovascular:      Rate and Rhythm: Normal rate and regular rhythm  Pulses: Normal pulses  Heart sounds: Normal heart sounds  Pulmonary:      Effort: Pulmonary effort is normal  No respiratory distress  Breath sounds: Normal breath sounds  Abdominal:      General: Bowel sounds are normal       Palpations: Abdomen is soft  Musculoskeletal:         General: No swelling or tenderness  Right lower leg: No edema  Left lower leg: No edema  Lymphadenopathy:      Cervical: No cervical adenopathy  Skin:     General: Skin is warm  Neurological:      General: No focal deficit present  Mental Status: He is alert and oriented to person, place, and time

## 2022-04-11 NOTE — ASSESSMENT & PLAN NOTE
Haldol administered, well tolerated  Continued to reiterate the importance of taking cogentin and or benadryl  Patient expressed understanding  Will continue his psychiatric care for now as patient unable to get psychiatric care due to undocumented status    Follow up for haldol injection in a month

## 2022-04-25 ENCOUNTER — PATIENT OUTREACH (OUTPATIENT)
Dept: FAMILY MEDICINE CLINIC | Facility: CLINIC | Age: 32
End: 2022-04-25

## 2022-04-25 NOTE — PROGRESS NOTES
MALDONADO SCHREIBER received referral from Dr Maylin Robison  Per chart review the patient has a dx of paranoid schizophrenia and receives haldol injections at the office  His psychiatric care is being managed by provider due to undocumented status  He denied SI/HI/AVH at last office visit  He has a hx of inpatient treatment  He has been unsuccessful in job search  The patient had previous outreach by Nicholas Garcia regarding mental health treatment but he was unable to seek treatment as undocumented and not able to afford self pay  The patient was unable to renew residencydue to not being able to afford the cost for application/new visa  MALDONADO SCHREIBER placed call to the patient, Nathaniel Arguelles and his number is out of service  MALDONADO SCHREIBER then placed call to Tiffanie's mother/emergency contact who was present at last office visit, Dany Mendze indicated does not speak [place]  MALDONADO SCHREIBER placed second call utilizing [employer] [place] interpretor #[phone]  There wsa no answer and MALDONADO SCHREIBER was unable to leave a message

## 2022-04-26 ENCOUNTER — HOSPITAL ENCOUNTER (EMERGENCY)
Facility: HOSPITAL | Age: 32
Discharge: HOME | End: 2022-04-26
Attending: EMERGENCY MEDICINE | Admitting: EMERGENCY MEDICINE

## 2022-04-26 VITALS
DIASTOLIC BLOOD PRESSURE: 87 MMHG | RESPIRATION RATE: 20 BRPM | BODY MASS INDEX: 24.78 KG/M2 | TEMPERATURE: 98.2 F | SYSTOLIC BLOOD PRESSURE: 147 MMHG | HEART RATE: 110 BPM | WEIGHT: 177.03 LBS | OXYGEN SATURATION: 97 % | HEIGHT: 71 IN

## 2022-04-26 DIAGNOSIS — R51.9 HEADACHE WITH REMOTE HISTORY OF TRAUMATIC HEAD INJURY: ICD-10-CM

## 2022-04-26 DIAGNOSIS — R51.9 HEADACHE: Primary | ICD-10-CM

## 2022-04-26 DIAGNOSIS — Z87.828 HEADACHE WITH REMOTE HISTORY OF TRAUMATIC HEAD INJURY: ICD-10-CM

## 2022-04-26 PROCEDURE — 99283 EMERGENCY DEPT VISIT LOW MDM: CPT

## 2022-04-26 PROCEDURE — 99284 EMERGENCY DEPT VISIT MOD MDM: CPT | Performed by: EMERGENCY MEDICINE

## 2022-04-26 RX ORDER — IBUPROFEN 600 MG/1
600 TABLET ORAL ONCE
Status: COMPLETED | OUTPATIENT
Start: 2022-04-26 | End: 2022-04-26

## 2022-04-26 RX ORDER — ACETAMINOPHEN 325 MG/1
975 TABLET ORAL ONCE
Status: COMPLETED | OUTPATIENT
Start: 2022-04-26 | End: 2022-04-26

## 2022-04-26 RX ADMIN — ACETAMINOPHEN 975 MG: 325 TABLET ORAL at 15:03

## 2022-04-26 RX ADMIN — IBUPROFEN 600 MG: 600 TABLET, FILM COATED ORAL at 15:03
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
## 2022-04-26 NOTE — DISCHARGE INSTRUCTIONS
Please follow up with your PCP for further evaluation  A referral for a doctor has been placed for you  Please take Tylenol and Motrin at home for headache  At this time, imaging is not necessary  You had a normal CT head last year and no recurrence of traumatic injury  You are medically cleared to perform all duties regarding your current job  Please return to the ED if you experience head injury, severe headache with vision loss or extremity weakness, trouble speaking, difficulty walking, or other concerning symptoms
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
## 2022-05-05 ENCOUNTER — PATIENT OUTREACH (OUTPATIENT)
Dept: FAMILY MEDICINE CLINIC | Facility: CLINIC | Age: 32
End: 2022-05-05

## 2022-05-05 NOTE — PROGRESS NOTES
MALDONADO SCHREIBER placed second call to the patient, Court Mediate utilizing [employer] [place] interpretor #[id number]  The line was still out of service  MALDONADO SCHREIBER placed Unable to Reach letter in the mail  MALDONADO SCHREIBER noted in chart that the patient has an upcoming appt at [address] [place] [address] on 5/11  MALODNADO SCHREIBER will keep referral open until that time to assess if Court Mediate is interested in speaking with social work  Xolair Pregnancy And Lactation Text: This medication is Pregnancy Category B and is considered safe during pregnancy. This medication is excreted in breast milk.

## 2022-05-05 NOTE — LETTER
[address] [facility] [address]  [phone]    Re: Monica Muñozalyssa to Reach   5/5/2022       Dear Court Mediate,    We tried to reach you by phone on 4/25/22 & 5/5/22 and was unfortunately unable to reach you  It is important that you contact the [facility]  as soon as possible at: [phone]      Sincerely,           Celio Carr, MSW, LSW

## 2022-05-11 ENCOUNTER — OFFICE VISIT (OUTPATIENT)
Dept: FAMILY MEDICINE CLINIC | Facility: CLINIC | Age: 32
End: 2022-05-11

## 2022-05-11 VITALS
DIASTOLIC BLOOD PRESSURE: 84 MMHG | SYSTOLIC BLOOD PRESSURE: 132 MMHG | OXYGEN SATURATION: 98 % | BODY MASS INDEX: 25.76 KG/M2 | TEMPERATURE: 98.2 F | RESPIRATION RATE: 20 BRPM | WEIGHT: 184 LBS | HEART RATE: 98 BPM | HEIGHT: 71 IN

## 2022-05-11 DIAGNOSIS — F20.0 PARANOID SCHIZOPHRENIA (HCC): Primary | Chronic | ICD-10-CM

## 2022-05-11 PROCEDURE — 99214 OFFICE O/P EST MOD 30 MIN: CPT | Performed by: FAMILY MEDICINE

## 2022-05-11 PROCEDURE — 96372 THER/PROPH/DIAG INJ SC/IM: CPT | Performed by: FAMILY MEDICINE

## 2022-05-11 RX ADMIN — HALOPERIDOL DECANOATE 50 MG: 50 INJECTION INTRAMUSCULAR at 15:56

## 2022-05-11 NOTE — PROGRESS NOTES
[facility]   [address] [place] [address]  Phone#  [phone]  Fax#  [phone]    ASSESSMENT and PLAN  Paranoid schizophrenia ([facility] [address] )  · Here for monthly Haldol follow up  · Haldol administered, well tolerated  · Continued to reiterate the importance of taking cogentin and or benadryl  Patient expressed understanding  · Follow up in 1 month for Haldol shot       There are no diagnoses linked to this encounter  HISTORY OF PRESENT ILLNESS  Jeffery Rinaldi is a 36 y o  male with a significant PMHx of Paranoid Schizophrenia who presents to the clinic for  management of their chronic medical conditions  Patient's medical conditions are stable unless noted otherwise above  Patient has not had any recent hospitalizations, or medical emergencies since last visit  Patient has no further complaints other than what is mentioned in the ROS  REVIEW OF SYSTEMS  Review of Systems   Constitutional: Negative for chills, fatigue, fever and unexpected weight change  HENT: Negative for congestion, rhinorrhea, sinus pressure and sore throat  Eyes: Negative for visual disturbance  Respiratory: Negative for cough, chest tightness, shortness of breath and wheezing  Cardiovascular: Negative for chest pain  Gastrointestinal: Negative for abdominal distention, abdominal pain, blood in stool, constipation, diarrhea, nausea and vomiting  Genitourinary: Negative for difficulty urinating, dysuria, frequency, hematuria, penile pain and urgency  Musculoskeletal: Negative for back pain and neck pain  Skin: Positive for rash  Allergic/Immunologic: Negative  Neurological: Negative for dizziness, weakness, light-headedness, numbness and headaches  Psychiatric/Behavioral: Negative for behavioral problems       PAST MEDICAL HISTORY   Past Medical History:   Diagnosis Date    Disease of thyroid gland     Hallucination     Psychiatric disorder     Psychiatric illness  [facility]      No past surgical history on file  Social History     Socioeconomic History    Marital status: Single     Spouse name: Not on file    Number of children: Not on file    Years of education: Not on file    Highest education level: Not on file   Occupational History    Not on file   Tobacco Use    Smoking status: Current Every Day Smoker     Packs/day: 0 50     Types: Cigarettes    Smokeless tobacco: Never Used   Vaping Use    Vaping Use: Never used   Substance and Sexual Activity    Alcohol use:  Yes     Alcohol/week: 7 0 - 10 0 standard drinks     Types: 7 - 10 Cans of beer per week     Comment: Socially    Drug use: No     Comment: pt refused to answer     Sexual activity: Not on file   Other Topics Concern    Not on file   Social History Narrative    Not on file     Social Determinants of Health     Financial Resource Strain: Unknown    Difficulty of Paying Living Expenses: Patient refused   Food Insecurity: Unknown    Worried About Running Out of Food in the Last Year: Patient refused    Ran Out of Food in the Last Year: Patient refused   Transportation Needs: Unknown    Lack of Transportation (Medical): Patient refused    Lack of Transportation (Non-Medical): Patient refused   Physical Activity: Not on file   Stress: Not on file   Social Connections: Not on file   Intimate Partner Violence: Not on file   Housing Stability: Not on file     Family History   Family history unknown: Yes       MEDICATIONS    Current Outpatient Medications:     benztropine (COGENTIN) 0 5 mg tablet, Take 1 tablet (0 5 mg total) by mouth 2 (two) times a day, Disp: 90 tablet, Rfl: 3    diphenhydrAMINE (BENADRYL) 25 mg tablet, Take 1 tablet (25 mg total) by mouth daily at bedtime as needed (If EPS symptoms), Disp: 30 tablet, Rfl: 0    haloperidol decanoate (Haldol Decanoate) 50 mg/mL injection, Inject 1 mL (50 mg total) into a muscle every 28 days, Disp: 1 mL, Rfl: 5    Current Facility-Administered Medications:     haloperidol decanoate (Haldol Decanoate) LONG-ACTING IM injection 50 mg, 50 mg, Intramuscular, Q28 Days, Adrian Chamorro MD, 50 mg at 05/11/22 1556    PHYSICAL EXAM  Vitals:    05/11/22 1524   BP: 132/84   BP Location: Left arm   Patient Position: Sitting   Cuff Size: Adult   Pulse: 98   Resp: 20   Temp: 98 2 °F (36 8 °C)   TempSrc: Temporal   SpO2: 98%   Weight: 83 5 kg (184 lb)   Height: 5' 11" (1 803 m)     Wt Readings from Last 3 Encounters:   05/11/22 83 5 kg (184 lb)   04/26/22 80 3 kg (177 lb 0 5 oz)   04/11/22 81 6 kg (180 lb)    , Body mass index is 25 66 kg/m²  Physical Exam  Vitals and nursing note reviewed  Constitutional:       General: He is not in acute distress  Appearance: He is well-developed  He is not toxic-appearing  HENT:      Head: Normocephalic and atraumatic  Eyes:      Extraocular Movements: Extraocular movements intact  Pupils: Pupils are equal, round, and reactive to light  Cardiovascular:      Rate and Rhythm: Normal rate and regular rhythm  Heart sounds: Normal heart sounds  No murmur heard  Pulmonary:      Effort: Pulmonary effort is normal  No respiratory distress  Breath sounds: Normal breath sounds  No wheezing, rhonchi or rales  Abdominal:      General: Bowel sounds are normal  There is no distension  Palpations: Abdomen is soft  Tenderness: There is no abdominal tenderness  There is no guarding  Musculoskeletal:         General: Normal range of motion  Cervical back: Normal range of motion and neck supple  Skin:     General: Skin is warm and dry  Findings: Rash present  Neurological:      Mental Status: He is alert and oriented to person, place, and time  Psychiatric:         Behavior: Behavior normal          LABS/IMAGING  Hemoglobin A1C   Date Value Ref Range Status   08/26/2021 5 3 Normal 3 8-5 6%; PreDiabetic 5 7-6 4%;  Diabetic >=6 5%; Glycemic control for adults with diabetes <7 0% % Final     HDL, Direct   Date Value Ref Range Status   08/26/2021 49 >=40 mg/dL Final     Comment:     HDL Cholesterol:       Low     <41 mg/dL  Specimen collection should occur prior to Metamizole administration due to the potential for falsley depressed results  Triglycerides   Date Value Ref Range Status   08/26/2021 72 <150 mg/dL Final     Comment:     Triglyceride:     Normal          <150 mg/dl     Borderline High 150-199 mg/dl     High            200-499 mg/dl        Very High       >499 mg/dl    Specimen collection should occur prior to N-Acetylcysteine or Metamizole administration due to the potential for falsely depressed results        WBC   Date Value Ref Range Status   08/26/2021 7 30 4 50 - 11 00 Thousand/uL Final   10/03/2018 8 40 4 50 - 11 00 Thousand/uL Final   09/11/2018 7 33 4 31 - 10 16 Thousand/uL Final     Hemoglobin   Date Value Ref Range Status   08/26/2021 16 0 13 5 - 17 5 g/dL Final   10/03/2018 16 4 13 5 - 17 5 g/dL Final   09/11/2018 16 0 12 0 - 17 0 g/dL Final     Platelets   Date Value Ref Range Status   08/26/2021 224 150 - 450 Thousands/uL Final   10/03/2018 270 150 - 450 Thousands/uL Final   09/11/2018 219 149 - 390 Thousands/uL Final     Potassium   Date Value Ref Range Status   08/26/2021 3 7 3 6 - 5 0 mmol/L Final   10/03/2018 3 6 3 6 - 5 0 mmol/L Final   09/27/2018 3 8 3 5 - 5 3 mmol/L Final     Chloride   Date Value Ref Range Status   08/26/2021 104 97 - 108 mmol/L Final   10/03/2018 103 97 - 108 mmol/L Final   09/27/2018 102 100 - 108 mmol/L Final     CO2   Date Value Ref Range Status   08/26/2021 27 22 - 30 mmol/L Final   10/03/2018 29 22 - 30 mmol/L Final   09/27/2018 32 21 - 32 mmol/L Final     BUN   Date Value Ref Range Status   08/26/2021 9 5 - 25 mg/dL Final   10/03/2018 12 5 - 25 mg/dL Final   09/27/2018 9 5 - 25 mg/dL Final     Creatinine   Date Value Ref Range Status   08/26/2021 0 82 0 70 - 1 50 mg/dL Final     Comment:     Standardized to IDMS reference method   10/03/2018 0 77 0 70 - 1 50 mg/dL Final     Comment:     Standardized to IDMS reference method   09/27/2018 0 98 0 60 - 1 30 mg/dL Final     Comment:     Standardized to IDMS reference method     eGFR   Date Value Ref Range Status   08/26/2021 119 >60 ml/min/1 73sq m Final   10/03/2018 125 >60 ml/min/1 73sq m Final   09/27/2018 106 ml/min/1 73sq m Final     Calcium   Date Value Ref Range Status   08/26/2021 9 5 8 4 - 10 2 mg/dL Final   10/03/2018 9 0 8 4 - 10 2 mg/dL Final   09/27/2018 9 7 8 3 - 10 1 mg/dL Final     AST   Date Value Ref Range Status   08/26/2021 29 17 - 59 U/L Final     Comment:     Specimen collection should occur prior to Sulfasalazine administration due to the potential for falsely depressed results  10/03/2018 47 17 - 59 U/L Final     Comment:       Specimen collection should occur prior to Sulfasalazine administration due to the potential for falsely depressed results  09/27/2018 96 (H) 5 - 45 U/L Final     Comment:       Specimen collection should occur prior to Sulfasalazine administration due to the potential for falsely depressed results  ALT   Date Value Ref Range Status   08/26/2021 18 <50 U/L Final     Comment:     Specimen collection should occur prior to Sulfasalazine administration due to the potential for falsely depressed results  10/03/2018 91 (H) 9 - 52 U/L Final     Comment:       Specimen collection should occur prior to Sulfasalazine administration due to the potential for falsely depressed results  09/27/2018 265 (H) 12 - 78 U/L Final     Comment:       Specimen collection should occur prior to Sulfasalazine administration due to the potential for falsely depressed results        Alkaline Phosphatase   Date Value Ref Range Status   08/26/2021 93 43 - 122 U/L Final   10/03/2018 105 43 - 122 U/L Final   09/27/2018 139 (H) 46 - 116 U/L Final     Magnesium   Date Value Ref Range Status   10/03/2018 2 5 (H) 1 6 - 2 3 mg/dL Final     No results found for: TSH    This note has been dictated using [employer] software  It may contain errors, including improperly dictated words  Please contact physician directly for any questions       Asa Travis MD   PGY-2

## 2022-05-14 NOTE — ASSESSMENT & PLAN NOTE
· Here for monthly Haldol follow up  · Haldol administered, well tolerated  · Continued to reiterate the importance of taking cogentin and or benadryl   Patient expressed understanding  · Follow up in 1 month for Haldol shot

## 2022-05-16 ENCOUNTER — PATIENT OUTREACH (OUTPATIENT)
Dept: FAMILY MEDICINE CLINIC | Facility: CLINIC | Age: 32
End: 2022-05-16

## 2022-05-16 NOTE — PROGRESS NOTES
MALDONADO CM did not receive response from Unable to Reach letter  SW CM will close referral due to lack of communication  SW CM will remain available for future psychosocial support as needed

## 2022-08-30 ENCOUNTER — OFFICE VISIT (OUTPATIENT)
Dept: FAMILY MEDICINE CLINIC | Facility: CLINIC | Age: 32
End: 2022-08-30

## 2022-08-30 VITALS
TEMPERATURE: 97.5 F | BODY MASS INDEX: 26.6 KG/M2 | HEART RATE: 75 BPM | HEIGHT: 71 IN | RESPIRATION RATE: 16 BRPM | WEIGHT: 190 LBS | SYSTOLIC BLOOD PRESSURE: 110 MMHG | DIASTOLIC BLOOD PRESSURE: 76 MMHG | OXYGEN SATURATION: 97 %

## 2022-08-30 DIAGNOSIS — F20.0 PARANOID SCHIZOPHRENIA (HCC): Primary | Chronic | ICD-10-CM

## 2022-08-30 PROCEDURE — 99213 OFFICE O/P EST LOW 20 MIN: CPT | Performed by: FAMILY MEDICINE

## 2022-08-30 PROCEDURE — 96372 THER/PROPH/DIAG INJ SC/IM: CPT | Performed by: FAMILY MEDICINE

## 2022-08-30 NOTE — ASSESSMENT & PLAN NOTE
- Here for monthly Haldol injection follow up  - Haldol (injection of Haloperidol decanoate) administered with 25 gauge needle on right deltoid, well tolerated  - Follow up in 1 month for Haldol shot

## 2022-08-30 NOTE — PROGRESS NOTES
Assessment/Plan:      Diagnoses and all orders for this visit:    Paranoid schizophrenia ([facility] [address] )        - Here for monthly Haldol follow up  - Haldol administered with 25 gauge needle on right deltoid, well tolerated  - Follow up in 1 month for Haldol shot     Subjective:     Patient ID: Pepe Hoyt is a 29 y o  male  This is a 28 yo male with pmh of paranoid schizophrenia who presents today for a monthly Haldol injection  As per chart, patient's last dose was in May and no reports of injection since then  As per patient's , he was working and did not have time to come  He has no complains today  Review of Systems   Constitutional: Negative for chills and fever  HENT: Negative for ear pain and sore throat  Eyes: Negative for pain and visual disturbance  Respiratory: Negative for cough and shortness of breath  Cardiovascular: Negative for chest pain and palpitations  Gastrointestinal: Negative for abdominal pain and vomiting  Genitourinary: Negative for dysuria and hematuria  Musculoskeletal: Negative for arthralgias and back pain  Skin: Negative for color change and rash  Neurological: Negative for seizures and syncope  All other systems reviewed and are negative  Objective:     Physical Exam  Constitutional:       General: He is not in acute distress  Appearance: Normal appearance  He is normal weight  He is not ill-appearing, toxic-appearing or diaphoretic  HENT:      Head: Normocephalic and atraumatic  Nose: Nose normal  No congestion  Eyes:      General:         Right eye: No discharge  Left eye: No discharge  Comments: Conjunctival injection b/l   Pulmonary:      Effort: Pulmonary effort is normal  No respiratory distress  Musculoskeletal:      Right lower leg: No edema  Left lower leg: No edema  Skin:     General: Skin is warm  Neurological:      General: No focal deficit present        Mental Status: He is alert and oriented to person, place, and time  Psychiatric:         Mood and Affect: Mood normal          Behavior: Behavior normal          Thought Content:  Thought content normal          Judgment: Judgment normal

## 2022-10-10 ENCOUNTER — OFFICE VISIT (OUTPATIENT)
Dept: FAMILY MEDICINE CLINIC | Facility: CLINIC | Age: 32
End: 2022-10-10

## 2022-10-10 VITALS
OXYGEN SATURATION: 96 % | WEIGHT: 190.2 LBS | HEART RATE: 91 BPM | DIASTOLIC BLOOD PRESSURE: 78 MMHG | RESPIRATION RATE: 19 BRPM | HEIGHT: 71 IN | TEMPERATURE: 96.9 F | BODY MASS INDEX: 26.63 KG/M2 | SYSTOLIC BLOOD PRESSURE: 126 MMHG

## 2022-10-10 DIAGNOSIS — F20.0 PARANOID SCHIZOPHRENIA (HCC): Chronic | ICD-10-CM

## 2022-10-10 PROCEDURE — 99213 OFFICE O/P EST LOW 20 MIN: CPT | Performed by: FAMILY MEDICINE

## 2022-10-10 NOTE — PROGRESS NOTES
Name: Zohra Barnes      : 1990      MRN: [id number]  Encounter Provider: Paradise Garcia MD  Encounter Date: 10/10/2022   Encounter department: [facility]     1  Paranoid schizophrenia ([facility] [address] )  Assessment & Plan:  Now has a job at [employer] in [place] and making enough to help his mother with bills  Has been able to apply for his green card now and still awaiting for approval      Advised to massage the region of injection   Offered gluteal injection, but he declines  we will alternate the site of injection to minimize this  Haldol administered, well tolerated  Has been non-compliant with cogentin or benadryl  Continued to reiterate the importance of taking cogentin and/or benadryl  Patient expressed understanding  Will continue his psychiatric care for now  Follow up for next haldol injection in a month      Subjective      HPI   Patient presents today for monthly haldol injection  He is doing very well today, accompanied by his mother today  He reports he experiences numbness after his injections and is requesting to decrease the dose of haldol  He states numbness lasts about 1 minute and then resolves  Now has a job at [employer] in [place] and making enough to help his mother with bills  Has been able to apply for his green card now and still awaiting for approval      Review of Systems   None other than stated above      Current Outpatient Medications on File Prior to Visit   Medication Sig   • benztropine (COGENTIN) 0 5 mg tablet Take 1 tablet (0 5 mg total) by mouth 2 (two) times a day   • diphenhydrAMINE (BENADRYL) 25 mg tablet Take 1 tablet (25 mg total) by mouth daily at bedtime as needed (If EPS symptoms)   • haloperidol decanoate (Haldol Decanoate) 50 mg/mL injection Inject 1 mL (50 mg total) into a muscle every 28 days       Objective     /78 (BP Location: Right arm, Patient Position: Sitting, Cuff Size: Standard) Pulse 91   Temp (!) 96 9 °F (36 1 °C) (Temporal)   Resp 19   Ht 5' 11" (1 803 m)   Wt 86 3 kg (190 lb 3 2 oz)   SpO2 96%   BMI 26 53 kg/m²     Physical Exam  Constitutional:       General: He is not in acute distress  Appearance: Normal appearance  Cardiovascular:      Rate and Rhythm: Normal rate and regular rhythm  Pulses: Normal pulses  Heart sounds: Normal heart sounds  Pulmonary:      Effort: Pulmonary effort is normal  No respiratory distress  Breath sounds: Normal breath sounds  Abdominal:      General: Bowel sounds are normal       Palpations: Abdomen is soft  Musculoskeletal:         General: No swelling or tenderness  Right lower leg: No edema  Left lower leg: No edema  Lymphadenopathy:      Cervical: No cervical adenopathy  Skin:     General: Skin is warm  Neurological:      General: No focal deficit present  Mental Status: He is alert and oriented to person, place, and time          Bro Baca MD

## 2022-10-10 NOTE — ASSESSMENT & PLAN NOTE
Advised to massage the region of injection   Offered gluteal injection, but he declines  we will alternate the site of injection to minimize this  Haldol administered, well tolerated  Has been non-compliant with cogentin or benadryl  Continued to reiterate the importance of taking cogentin and/or benadryl  Patient expressed understanding      Will continue his psychiatric care for now  Follow up for next haldol injection in a month

## 2022-10-12 PROBLEM — Z00.00 HEALTHCARE MAINTENANCE: Status: RESOLVED | Noted: 2021-12-02 | Resolved: 2022-10-12

## 2022-11-28 ENCOUNTER — OFFICE VISIT (OUTPATIENT)
Dept: FAMILY MEDICINE CLINIC | Facility: CLINIC | Age: 32
End: 2022-11-28

## 2022-11-28 VITALS
OXYGEN SATURATION: 99 % | RESPIRATION RATE: 19 BRPM | WEIGHT: 170 LBS | HEART RATE: 78 BPM | SYSTOLIC BLOOD PRESSURE: 118 MMHG | TEMPERATURE: 97.6 F | HEIGHT: 71 IN | DIASTOLIC BLOOD PRESSURE: 70 MMHG | BODY MASS INDEX: 23.8 KG/M2

## 2022-11-28 DIAGNOSIS — F20.0 PARANOID SCHIZOPHRENIA (HCC): Primary | Chronic | ICD-10-CM

## 2022-11-28 RX ORDER — HALOPERIDOL DECANOATE 50 MG/ML
50 INJECTION INTRAMUSCULAR
Qty: 1 ML | Refills: 5 | Status: SHIPPED | OUTPATIENT
Start: 2022-11-28

## 2022-11-28 NOTE — ASSESSMENT & PLAN NOTE
Haldol administered, well tolerated  Continues to be non-compliant with cogentin or benadryl  Continued to reiterate the importance of taking cogentin and/or benadryl  Patient expressed understanding but states he will take it when he pleases  Will continue his psychiatric care for now until his Green card process goes through to allow him to obtain a psychiatrist   Follow up for annual physical and next haldol injection in a month

## 2022-11-28 NOTE — PROGRESS NOTES
Name: Clinton Mitchell      : 1990      MRN: [id number]  Encounter Provider: Maritza Finley MD  Encounter Date: 2022   Encounter department: [facility] [place] [address]  Paranoid schizophrenia ([facility] [address] )  Assessment & Plan:  Haldol administered, well tolerated  Continues to be non-compliant with cogentin or benadryl  Continued to reiterate the importance of taking cogentin and/or benadryl  Patient expressed understanding but states he will take it when he pleases  Will continue his psychiatric care for now until his Green card process goes through to allow him to obtain a psychiatrist   Follow up for annual physical and next haldol injection in a month  Orders:  -     haloperidol decanoate (Haldol Decanoate) 50 mg/mL injection; Inject 1 mL (50 mg total) into a muscle every 28 days    Subjective      HPI   Patient presents today for monthly haldol injection  He is doing very well today,   Continues to work at [employer] in [place] and help his mother with bills  Has been able to apply for his green card now and still awaiting for approval      Review of Systems   None other than stated above  Current Outpatient Medications on File Prior to Visit   Medication Sig   • benztropine (COGENTIN) 0 5 mg tablet Take 1 tablet (0 5 mg total) by mouth 2 (two) times a day   • diphenhydrAMINE (BENADRYL) 25 mg tablet Take 1 tablet (25 mg total) by mouth daily at bedtime as needed (If EPS symptoms)   • [DISCONTINUED] haloperidol decanoate (Haldol Decanoate) 50 mg/mL injection Inject 1 mL (50 mg total) into a muscle every 28 days       Objective     /70 (BP Location: Left arm, Patient Position: Sitting, Cuff Size: Standard)   Pulse 78   Temp 97 6 °F (36 4 °C) (Temporal)   Resp 19   Ht 5' 11" (1 803 m)   Wt 77 1 kg (170 lb)   SpO2 99%   BMI 23 71 kg/m²     Physical Exam  Constitutional:       General: He is not in acute distress       Appearance: Normal appearance  Cardiovascular:      Rate and Rhythm: Normal rate and regular rhythm  Pulses: Normal pulses  Heart sounds: Normal heart sounds  Pulmonary:      Effort: Pulmonary effort is normal  No respiratory distress  Breath sounds: Normal breath sounds  Abdominal:      General: Bowel sounds are normal       Palpations: Abdomen is soft  Musculoskeletal:         General: No swelling or tenderness  Right lower leg: No edema  Left lower leg: No edema  Lymphadenopathy:      Cervical: No cervical adenopathy  Skin:     General: Skin is warm  Neurological:      General: No focal deficit present  Mental Status: He is alert and oriented to person, place, and time          Bertha Cao MD

## 2023-03-06 ENCOUNTER — OFFICE VISIT (OUTPATIENT)
Dept: FAMILY MEDICINE CLINIC | Facility: CLINIC | Age: 33
End: 2023-03-06

## 2023-03-06 VITALS
RESPIRATION RATE: 16 BRPM | BODY MASS INDEX: 21.7 KG/M2 | DIASTOLIC BLOOD PRESSURE: 80 MMHG | TEMPERATURE: 98.7 F | WEIGHT: 155 LBS | SYSTOLIC BLOOD PRESSURE: 120 MMHG | OXYGEN SATURATION: 98 % | HEIGHT: 71 IN | HEART RATE: 58 BPM

## 2023-03-06 DIAGNOSIS — F20.0 PARANOID SCHIZOPHRENIA (HCC): Primary | Chronic | ICD-10-CM

## 2023-03-06 NOTE — PATIENT INSTRUCTIONS
Extrapyramidal Symptoms   WHAT YOU NEED TO KNOW:   Extrapyramidal symptoms (EPS) are side effects of antipsychotic medicines  EPS can cause movement and muscle control problems throughout your body  DISCHARGE INSTRUCTIONS:   Common symptoms:  Symptoms may be noticed after you take one dose of medicine or after long-term use  You may not be aware of these symptoms, but others close to you may notice any of the following:  Restlessness and nervous energy shown by pacing, marching, shuffling, or foot-tapping    Severe, uncontrolled muscle contractions of your head, neck, trunk, and limbs that cause stiff tongue, twisted neck, and back arching    Tremors, stiff posture, or no arm movement when you walk    Uncontrolled movements of your tongue, jaw, lips, or face, such as pursing, chewing, or frequent eye blinking    Uncontrolled movements of your fingers or toes, head nodding, or pelvic thrusting    Fast, irregular breathing with grunts, gasping, or sighing    Weak voice, drooling, or little or no facial expression    If you or someone close to you notices symptoms:   Do not stop taking your medicines  unless your healthcare provider says it is okay  Contact your healthcare provider  Take a list of your medicines with you to your appointment  © Copyright [employer] 2022 Information is for End User's use only and may not be sold, redistributed or otherwise used for commercial purposes  The above information is an  only  It is not intended as medical advice for individual conditions or treatments  Talk to your doctor, nurse or pharmacist before following any medical regimen to see if it is safe and effective for you

## 2023-03-06 NOTE — PROGRESS NOTES
Name: Ana Rosa Núñez      : 1990      MRN: [id number]  Encounter Provider: Valery Romero MD  Encounter Date: 3/6/2023   Encounter department: [address] [place] [address]  [facility]  Assessment & Plan:  Haldol administered, well tolerated  Continued to reiterate the importance of taking cogentin and/or benadryl  Patient expressed understanding   Follow up for annual physical and next haldol injection in a month  Subjective      HPI   Patient presents today for monthly haldol injection  He is doing well today, accompanied by his mother  Review of Systems   None other than stated above  Current Outpatient Medications on File Prior to Visit   Medication Sig   • benztropine (COGENTIN) 0 5 mg tablet Take 1 tablet (0 5 mg total) by mouth 2 (two) times a day   • diphenhydrAMINE (BENADRYL) 25 mg tablet Take 1 tablet (25 mg total) by mouth daily at bedtime as needed (If EPS symptoms)   • haloperidol decanoate (Haldol Decanoate) 50 mg/mL injection Inject 1 mL (50 mg total) into a muscle every 28 days       Objective     /80 (BP Location: Right arm, Patient Position: Sitting, Cuff Size: Standard)   Pulse 58   Temp 98 7 °F (37 1 °C) (Temporal)   Resp 16   Ht 5' 11" (1 803 m)   Wt 70 3 kg (155 lb)   SpO2 98%   BMI 21 62 kg/m²     Physical Exam  Constitutional:       General: He is not in acute distress  Appearance: Normal appearance  Cardiovascular:      Rate and Rhythm: Normal rate and regular rhythm  Pulses: Normal pulses  Heart sounds: Normal heart sounds  Pulmonary:      Effort: Pulmonary effort is normal  No respiratory distress  Breath sounds: Normal breath sounds  Abdominal:      General: Bowel sounds are normal       Palpations: Abdomen is soft  Musculoskeletal:         General: No swelling or tenderness  Right lower leg: No edema  Left lower leg: No edema     Lymphadenopathy: Cervical: No cervical adenopathy  Skin:     General: Skin is warm  Neurological:      General: No focal deficit present  Mental Status: He is alert and oriented to person, place, and time

## 2023-03-07 NOTE — ASSESSMENT & PLAN NOTE
Haldol administered, well tolerated  Continued to reiterate the importance of taking cogentin and/or benadryl  Patient expressed understanding   Follow up for annual physical and next haldol injection in a month

## 2023-04-02 DIAGNOSIS — F20.0 PARANOID SCHIZOPHRENIA (HCC): Chronic | ICD-10-CM

## 2023-04-03 ENCOUNTER — OFFICE VISIT (OUTPATIENT)
Dept: FAMILY MEDICINE CLINIC | Facility: CLINIC | Age: 33
End: 2023-04-03

## 2023-04-03 VITALS
OXYGEN SATURATION: 95 % | RESPIRATION RATE: 19 BRPM | WEIGHT: 165.2 LBS | TEMPERATURE: 98.3 F | DIASTOLIC BLOOD PRESSURE: 66 MMHG | SYSTOLIC BLOOD PRESSURE: 120 MMHG | HEART RATE: 84 BPM | BODY MASS INDEX: 23.13 KG/M2 | HEIGHT: 71 IN

## 2023-04-03 DIAGNOSIS — Z13.220 LIPID SCREENING: ICD-10-CM

## 2023-04-03 DIAGNOSIS — B35.4 TINEA CORPORIS: ICD-10-CM

## 2023-04-03 DIAGNOSIS — F20.0 PARANOID SCHIZOPHRENIA (HCC): Chronic | ICD-10-CM

## 2023-04-03 DIAGNOSIS — Z00.00 ENCOUNTER FOR ANNUAL PHYSICAL EXAM: Primary | ICD-10-CM

## 2023-04-03 RX ORDER — BENZTROPINE MESYLATE 0.5 MG/1
TABLET ORAL
Qty: 60 TABLET | Refills: 0 | Status: SHIPPED | OUTPATIENT
Start: 2023-04-03

## 2023-04-03 RX ORDER — DIPHENHYDRAMINE HCL 25 MG
25 TABLET ORAL
Qty: 30 TABLET | Refills: 0 | Status: SHIPPED | OUTPATIENT
Start: 2023-04-03

## 2023-04-03 RX ORDER — CLOTRIMAZOLE 1 %
CREAM (GRAM) TOPICAL 2 TIMES DAILY
Qty: 30 G | Refills: 0 | Status: SHIPPED | OUTPATIENT
Start: 2023-04-03

## 2023-04-03 NOTE — ASSESSMENT & PLAN NOTE
Haldol administered, well tolerated  Continued to reiterate the importance of taking cogentin and/or benadryl  Patient expressed understanding   Medications refilled  Follow up for next haldol injection in a month

## 2023-04-03 NOTE — PATIENT INSTRUCTIONS
Wellness Visit for Adults   AMBULATORY CARE:   A wellness visit  is when you see your healthcare provider to get screened for health problems  Your healthcare provider will also give you advice on how to stay healthy  Write down your questions so you remember to ask them  Ask your healthcare provider how often you should have a wellness visit  What happens at a wellness visit:  Your healthcare provider will ask about your health, and your family history of health problems  This includes high blood pressure, heart disease, and cancer  He or she will ask if you have symptoms that concern you, if you smoke, and about your mood  You may also be asked about your intake of medicines, supplements, food, and alcohol  Any of the following may be done: Your weight  will be checked  Your height may also be checked so your body mass index (BMI) can be calculated  Your BMI shows if you are at a healthy weight  Your blood pressure  and heart rate will be checked  Your temperature may also be checked  Blood and urine tests  may be done  Blood tests may be done to check your cholesterol levels  Abnormal cholesterol levels increase your risk for heart disease and stroke  You may also need a blood or urine test to check for diabetes if you are at increased risk  Urine tests may be done to look for signs of an infection or kidney disease  A physical exam  includes checking your heartbeat and lungs with a stethoscope  Your healthcare provider may also check your skin to look for sun damage  Screening tests  may be recommended  A screening test is done to check for diseases that may not cause symptoms  The screening tests you may need depend on your age, gender, family history, and lifestyle habits  For example, colorectal screening may be recommended if you are 48years old or older  Screening tests you need if you are a woman:   A Pap smear  is used to screen for cervical cancer   Pap smears are usually done every 3 to 5 years depending on your age  You may need them more often if you have had abnormal Pap smear test results in the past  Ask your healthcare provider how often you should have a Pap smear  A mammogram  is an x-ray of your breasts to screen for breast cancer  Experts recommend mammograms every 2 years starting at age 48 years  You may need a mammogram at age 52 years or younger if you have an increased risk for breast cancer  Talk to your healthcare provider about when you should start having mammograms and how often you need them  Vaccines you may need:   Get an influenza vaccine  every year  The influenza vaccine protects you from the flu  Several types of viruses cause the flu  The viruses change over time, so new vaccines are made each year  Get a tetanus-diphtheria (Td) booster vaccine  every 10 years  This vaccine protects you against tetanus and diphtheria  Tetanus is a severe infection that may cause painful muscle spasms and lockjaw  Diphtheria is a severe bacterial infection that causes a thick covering in the back of your mouth and throat  Get a human papillomavirus (HPV) vaccine  if you are female and aged 23 to 32 or male 23 to 24 and never received it  This vaccine protects you from HPV infection  HPV is the most common infection spread by sexual contact  HPV may also cause vaginal, penile, and anal cancers  Get a pneumococcal vaccine  if you are aged 72 years or older  The pneumococcal vaccine is an injection given to protect you from pneumococcal disease  Pneumococcal disease is an infection caused by pneumococcal bacteria  The infection may cause pneumonia, meningitis, or an ear infection  Get a shingles vaccine  if you are 60 or older, even if you have had shingles before  The shingles vaccine is an injection to protect you from the varicella-zoster virus  This is the same virus that causes chickenpox   Shingles is a painful rash that develops in people who had chickenpox or have been exposed to the virus  How to eat healthy:  My Plate is a model for planning healthy meals  It shows the types and amounts of foods that should go on your plate  Fruits and vegetables make up about half of your plate, and grains and protein make up the other half  A serving of dairy is included on the side of your plate  The amount of calories and serving sizes you need depends on your age, gender, weight, and height  Examples of healthy foods are listed below:  Eat a variety of vegetables  such as dark green, red, and orange vegetables  You can also include canned vegetables low in sodium (salt) and frozen vegetables without added butter or sauces  Eat a variety of fresh fruits , canned fruit in 100% juice, frozen fruit, and dried fruit  Include whole grains  At least half of the grains you eat should be whole grains  Examples include whole-wheat bread, wheat pasta, brown rice, and whole-grain cereals such as oatmeal     Eat a variety of protein foods such as seafood (fish and shellfish), lean meat, and poultry without skin (turkey and chicken)  Examples of lean meats include pork leg, shoulder, or tenderloin, and beef round, sirloin, tenderloin, and extra lean ground beef  Other protein foods include eggs and egg substitutes, beans, peas, soy products, nuts, and seeds  Choose low-fat dairy products such as skim or 1% milk or low-fat yogurt, cheese, and cottage cheese  Limit unhealthy fats  such as butter, hard margarine, and shortening  Exercise:  Exercise at least 30 minutes per day on most days of the week  Some examples of exercise include walking, biking, dancing, and swimming  You can also fit in more physical activity by taking the stairs instead of the elevator or parking farther away from stores  Include muscle strengthening activities 2 days each week  Regular exercise provides many health benefits   It helps you manage your weight, and decreases your risk for type 2 diabetes, heart disease, stroke, and high blood pressure  Exercise can also help improve your mood  Ask your healthcare provider about the best exercise plan for you  General health and safety guidelines:   Do not smoke  Nicotine and other chemicals in cigarettes and cigars can cause lung damage  Ask your healthcare provider for information if you currently smoke and need help to quit  E-cigarettes or smokeless tobacco still contain nicotine  Talk to your healthcare provider before you use these products  Limit alcohol  A drink of alcohol is 12 ounces of beer, 5 ounces of wine, or 1½ ounces of liquor  Lose weight, if needed  Being overweight increases your risk of certain health conditions  These include heart disease, high blood pressure, type 2 diabetes, and certain types of cancer  Protect your skin  Do not sunbathe or use tanning beds  Use sunscreen with a SPF 15 or higher  Apply sunscreen at least 15 minutes before you go outside  Reapply sunscreen every 2 hours  Wear protective clothing, hats, and sunglasses when you are outside  Drive safely  Always wear your seatbelt  Make sure everyone in your car wears a seatbelt  A seatbelt can save your life if you are in an accident  Do not use your cell phone when you are driving  This could distract you and cause an accident  Pull over if you need to make a call or send a text message  Practice safe sex  Use latex condoms if are sexually active and have more than one partner  Your healthcare provider may recommend screening tests for sexually transmitted infections (STIs)  Wear helmets, lifejackets, and protective gear  Always wear a helmet when you ride a bike or motorcycle, go skiing, or play sports that could cause a head injury  Wear protective equipment when you play sports  Wear a lifejacket when you are on a boat or doing water sports      © Copyright [employer] 2022 Information is for End User's use only and may not be sold, redistributed or otherwise used for commercial purposes  The above information is an  only  It is not intended as medical advice for individual conditions or treatments  Talk to your doctor, nurse or pharmacist before following any medical regimen to see if it is safe and effective for you

## 2023-04-03 NOTE — PROGRESS NOTES
[address] [facility] [place]    NAME: Deepak Holland  AGE: 29 y o  SEX: male  : 1990     DATE: 4/3/2023     Assessment and Plan:     Problem List Items Addressed This Visit        Other    Paranoid schizophrenia ([address] ) (Chronic)     Haldol administered, well tolerated  Continued to reiterate the importance of taking cogentin and/or benadryl  Patient expressed understanding   Medications refilled  Follow up for next haldol injection in a month  Relevant Medications    diphenhydrAMINE (BENADRYL) 25 mg tablet    Other Relevant Orders    Ambulatory Referral to Financial Counseling Program   Other Visit Diagnoses     Encounter for annual physical exam    -  Primary    Relevant Orders    Lipid Panel with Direct LDL reflex    CBC and differential    Comprehensive metabolic panel    HEMOGLOBIN A1C W/ EAG ESTIMATION    TSH, 3rd generation with Free T4 reflex    Lipid screening        Relevant Orders    Lipid Panel with Direct LDL reflex    Tinea corporis        Relevant Medications    clotrimazole (LOTRIMIN) 1 % cream        Immunizations and preventive care screenings were discussed with patient today  Appropriate education was printed on patient's after visit summary  Counseling:  Alcohol/drug use: discussed moderation in alcohol intake, the recommendations for healthy alcohol use, and avoidance of illicit drug use  Dental Health: discussed importance of regular tooth brushing, flossing, and dental visits  Sexual health: discussed sexually transmitted diseases, partner selection, use of condoms, avoidance of unintended pregnancy, and contraceptive alternatives  · Exercise: the importance of regular exercise/physical activity was discussed  Recommend exercise 3-5 times per week for at least 30 minutes  Tobacco Cessation Counseling: Tobacco cessation counseling was provided   The patient is sincerely urged to quit consumption of tobacco  He is not ready to quit tobacco  Medication options and side effects of medication discussed  Patient refused medication  Return in about 4 weeks (around 2023) for Haldol shot  Chief Complaint:     Chief Complaint   Patient presents with   • Physical Exam     Haldol injection       History of Present Illness:     Adult Annual Physical   Patient here for a comprehensive physical exam  The patient reports he now has a job working in a warehouse  He is saving up to renew his green card which   Sexually active - no  Smokes 1 pack every 2 days, not ready to quit  Mother reports patient is much calmer at home now,  But he quit his job a few months ago due to being too tired and is currently looking for another  Patient denies SI/HI/AVH    Diet and Physical Activity  · Diet/Nutrition: limited junk food  · Exercise: no formal exercise  Depression Screening  PHQ-2/9 Depression Screening         General Health  · Sleep: sleeps well  · Hearing: normal - bilateral   · Vision: no vision problems  · Dental: no dental visits for >1 year   Health  · History of STDs?: no      Review of Systems:     Review of Systems   Constitutional: Negative for fatigue and fever  HENT: Negative for congestion, ear pain and sinus pain  Respiratory: Negative for cough and shortness of breath  Cardiovascular: Negative for chest pain  Gastrointestinal: Negative for abdominal pain  Genitourinary: Negative for dysuria  Musculoskeletal: Negative for joint swelling  Skin: Negative for rash  Neurological: Negative for dizziness and headaches  Psychiatric/Behavioral: Negative for hallucinations  Past Medical History:     Past Medical History:   Diagnosis Date   • Disease of thyroid gland    • Hallucination    • Psychiatric disorder    • Psychiatric illness    • Schizophrenia [facility])       Past Surgical History:     History reviewed  No pertinent surgical history     Social History:     Social History     Socioeconomic History   • Marital status: Single     Spouse name: None   • Number of children: None   • Years of education: None   • Highest education level: None   Occupational History   • None   Tobacco Use   • Smoking status: Every Day     Packs/day: 0 50     Types: Cigarettes   • Smokeless tobacco: Never   Vaping Use   • Vaping Use: Never used   Substance and Sexual Activity   • Alcohol use: Yes     Alcohol/week: 7 0 - 10 0 standard drinks     Types: 7 - 10 Cans of beer per week     Comment: Socially   • Drug use: No     Comment: pt refused to answer    • Sexual activity: None   Other Topics Concern   • None   Social History Narrative   • None     Social Determinants of Health     Financial Resource Strain: Low Risk    • Difficulty of Paying Living Expenses: Not hard at all   Food Insecurity: No Food Insecurity   • Worried About Running Out of Food in the Last Year: Never true   • Ran Out of Food in the Last Year: Never true   Transportation Needs: No Transportation Needs   • Lack of Transportation (Medical): No   • Lack of Transportation (Non-Medical):  No   Physical Activity: Not on file   Stress: Not on file   Social Connections: Not on file   Intimate Partner Violence: Not on file   Housing Stability: Not on file      Family History:     Family History   Family history unknown: Yes      Current Medications:     Current Outpatient Medications   Medication Sig Dispense Refill   • clotrimazole (LOTRIMIN) 1 % cream Apply topically 2 (two) times a day 30 g 0   • diphenhydrAMINE (BENADRYL) 25 mg tablet Take 1 tablet (25 mg total) by mouth daily at bedtime as needed (If EPS symptoms) 30 tablet 0   • benztropine (COGENTIN) 0 5 mg tablet TAKE ONE TABLET BY MOUTH TWICE DAILY 60 tablet 0   • haloperidol decanoate (Haldol Decanoate) 50 mg/mL injection Inject 1 mL (50 mg total) into a muscle every 28 days 1 mL 5     Current Facility-Administered Medications   Medication Dose Route Frequency "Provider Last Rate Last Admin   • haloperidol decanoate (Haldol Decanoate) LONG-ACTING IM injection 50 mg  50 mg Intramuscular Q28 Days Griffin Peter MD   50 mg at 05/11/22 1556      Allergies:     No Known Allergies   Physical Exam:     /66 (BP Location: Right arm, Patient Position: Sitting, Cuff Size: Standard)   Pulse 84   Temp 98 3 °F (36 8 °C) (Temporal)   Resp 19   Ht 5' 11\" (1 803 m)   Wt 74 9 kg (165 lb 3 2 oz)   SpO2 95%   BMI 23 04 kg/m²     Physical Exam  Vitals and nursing note reviewed  Constitutional:       Appearance: He is well-developed  HENT:      Head: Normocephalic and atraumatic  Right Ear: Tympanic membrane, ear canal and external ear normal  There is no impacted cerumen  Left Ear: Tympanic membrane, ear canal and external ear normal  There is no impacted cerumen  Nose: No congestion or rhinorrhea  Mouth/Throat:      Mouth: Mucous membranes are moist       Pharynx: No oropharyngeal exudate or posterior oropharyngeal erythema  Eyes:      General:         Right eye: No discharge  Conjunctiva/sclera: Conjunctivae normal    Cardiovascular:      Rate and Rhythm: Normal rate and regular rhythm  Heart sounds: No murmur heard  Pulmonary:      Effort: Pulmonary effort is normal  No respiratory distress  Breath sounds: Normal breath sounds  Abdominal:      Palpations: Abdomen is soft  Tenderness: There is no abdominal tenderness  Musculoskeletal:      Cervical back: Neck supple  Right lower leg: No edema  Left lower leg: No edema  Skin:     General: Skin is warm and dry  Neurological:      Mental Status: He is alert and oriented to person, place, and time  Psychiatric:         Mood and Affect: Mood normal          Thought Content:  Thought content normal           Griffin Peter MD   [facility]  "

## 2023-04-04 ENCOUNTER — TELEPHONE (OUTPATIENT)
Dept: FAMILY MEDICINE CLINIC | Facility: CLINIC | Age: 33
End: 2023-04-04

## 2023-04-04 NOTE — TELEPHONE ENCOUNTER
PCP SIGNATURE NEEDED FOR COVER MY MEDS  FORM RECEIVED VIA FAX AND PLACED IN PCP FOLDER TO BE DELIVERED AT ASSIGNED TIMES        PRIOR AUTH BENZTROPINE MESYLATE 0 5MG

## 2023-05-01 ENCOUNTER — OFFICE VISIT (OUTPATIENT)
Dept: FAMILY MEDICINE CLINIC | Facility: CLINIC | Age: 33
End: 2023-05-01

## 2023-05-01 VITALS
SYSTOLIC BLOOD PRESSURE: 126 MMHG | WEIGHT: 166 LBS | HEIGHT: 71 IN | DIASTOLIC BLOOD PRESSURE: 74 MMHG | HEART RATE: 101 BPM | OXYGEN SATURATION: 98 % | RESPIRATION RATE: 18 BRPM | BODY MASS INDEX: 23.24 KG/M2 | TEMPERATURE: 98.1 F

## 2023-05-01 DIAGNOSIS — F20.0 PARANOID SCHIZOPHRENIA (HCC): Primary | Chronic | ICD-10-CM

## 2023-05-01 DIAGNOSIS — F17.200 TOBACCO DEPENDENCE: ICD-10-CM

## 2023-05-01 DIAGNOSIS — B35.4 TINEA CORPORIS: ICD-10-CM

## 2023-05-01 RX ORDER — HALOPERIDOL DECANOATE 50 MG/ML
50 INJECTION INTRAMUSCULAR
Qty: 1 ML | Refills: 5 | Status: SHIPPED | OUTPATIENT
Start: 2023-05-01

## 2023-05-01 RX ORDER — CLOTRIMAZOLE 1 %
CREAM (GRAM) TOPICAL 2 TIMES DAILY
Qty: 30 G | Refills: 0 | Status: SHIPPED | OUTPATIENT
Start: 2023-05-01

## 2023-05-01 NOTE — ASSESSMENT & PLAN NOTE
Smokes 1 pack every 2 days, will work on quitting, but not ready  Smoking cessation counseling 4 minutes  Still in precontemplation stage

## 2023-05-01 NOTE — PROGRESS NOTES
Name: Gilberto Donald      : 1990      MRN: [id number]  Encounter Provider: Den Fleming MD  Encounter Date: 2023   Encounter department: [place] [address]    Assessment & Plan     1  Paranoid schizophrenia ([facility] [address] )  Assessment & Plan:  Haldol administered, well tolerated  Continued to reiterate the importance of taking cogentin and/or benadryl  Patient expressed understanding   Medications refilled  Follow up for next haldol injection in a month    Orders:  -     haloperidol decanoate (Haldol Decanoate) 50 mg/mL injection; Inject 1 mL (50 mg total) into a muscle every 28 days    2  Tobacco dependence  Assessment & Plan:  Smokes 1 pack every 2 days, will work on quitting, but not ready  Smoking cessation counseling 4 minutes  Still in precontemplation stage      3  Tinea corporis  Assessment & Plan: On bilateral arms  Sent clotrimazole cream BID    Orders:  -     clotrimazole (LOTRIMIN) 1 % cream; Apply topically 2 (two) times a day     Subjective      HPI   Patient presents today for monthly haldol injection  He is doing well today, accompanied by his mother       Review of Systems   Denies fevers, chills, unintentional weight loss, excessive fatigue, chest pain, shortness of breath, palpitations, syncopal or presyncopal episodes, abdominal pain, nausea, vomiting, urinary frequency, dysuria    Current Outpatient Medications on File Prior to Visit   Medication Sig    benztropine (COGENTIN) 0 5 mg tablet TAKE ONE TABLET BY MOUTH TWICE DAILY    diphenhydrAMINE (BENADRYL) 25 mg tablet Take 1 tablet (25 mg total) by mouth daily at bedtime as needed (If EPS symptoms)    [DISCONTINUED] clotrimazole (LOTRIMIN) 1 % cream Apply topically 2 (two) times a day    [DISCONTINUED] haloperidol decanoate (Haldol Decanoate) 50 mg/mL injection Inject 1 mL (50 mg total) into a muscle every 28 days       Objective     /74 (BP Location: Left arm, Patient Position: Sitting, Cuff Size: "Standard)   Pulse 101   Temp 98 1 °F (36 7 °C) (Temporal)   Resp 18   Ht 5' 11\" (1 803 m)   Wt 75 3 kg (166 lb)   SpO2 98%   BMI 23 15 kg/m²     Physical Exam  Constitutional:       General: He is not in acute distress  Appearance: Normal appearance  Cardiovascular:      Rate and Rhythm: Normal rate and regular rhythm  Pulses: Normal pulses  Heart sounds: Normal heart sounds  Pulmonary:      Effort: Pulmonary effort is normal  No respiratory distress  Breath sounds: Normal breath sounds  Abdominal:      General: Bowel sounds are normal       Palpations: Abdomen is soft  Musculoskeletal:         General: No swelling or tenderness  Right lower leg: No edema  Left lower leg: No edema  Lymphadenopathy:      Cervical: No cervical adenopathy  Skin:     General: Skin is warm  Neurological:      General: No focal deficit present  Mental Status: He is alert and oriented to person, place, and time       Jennifer Mazariegos"

## 2023-06-01 ENCOUNTER — OFFICE VISIT (OUTPATIENT)
Dept: FAMILY MEDICINE CLINIC | Facility: CLINIC | Age: 33
End: 2023-06-01

## 2023-06-01 VITALS
HEIGHT: 71 IN | HEART RATE: 78 BPM | WEIGHT: 162 LBS | SYSTOLIC BLOOD PRESSURE: 124 MMHG | TEMPERATURE: 98.1 F | OXYGEN SATURATION: 98 % | RESPIRATION RATE: 18 BRPM | DIASTOLIC BLOOD PRESSURE: 76 MMHG | BODY MASS INDEX: 22.68 KG/M2

## 2023-06-01 DIAGNOSIS — F20.0 PARANOID SCHIZOPHRENIA (HCC): Primary | Chronic | ICD-10-CM

## 2023-06-01 NOTE — ASSESSMENT & PLAN NOTE
Haldol administered, well tolerated  Continued to reiterate the importance of taking cogentin and/or benadryl   Patient expressed understanding   He has found a new job now in [employer] working 8h shifts for about 4-5 days  Follow up for next haldol injection in a month

## 2023-06-01 NOTE — PROGRESS NOTES
"Name: Radha Hardwick      : 1990      MRN: [id number]  Encounter Provider: Dereck Locke MD  Encounter Date: 2023   Encounter department: [address] [place] [address]  [facility] ([facility] )  Assessment & Plan:  Haldol administered, well tolerated  Continued to reiterate the importance of taking cogentin and/or benadryl  Patient expressed understanding   He has found a new job now in [place] working 8h shifts for about 4-5 days  Follow up for next haldol injection in a month       Subjective      HPI   Patient presents today for monthly haldol injection  He is doing well today, accompanied by his mother  Review of Systems   Denies fevers, chills, unintentional weight loss, excessive fatigue, chest pain, shortness of breath, palpitations, syncopal or presyncopal episodes, abdominal pain, nausea, vomiting, urinary frequency, dysuria    Current Outpatient Medications on File Prior to Visit   Medication Sig   • benztropine (COGENTIN) 0 5 mg tablet TAKE ONE TABLET BY MOUTH TWICE DAILY   • clotrimazole (LOTRIMIN) 1 % cream Apply topically 2 (two) times a day   • diphenhydrAMINE (BENADRYL) 25 mg tablet Take 1 tablet (25 mg total) by mouth daily at bedtime as needed (If EPS symptoms)   • haloperidol decanoate (Haldol Decanoate) 50 mg/mL injection Inject 1 mL (50 mg total) into a muscle every 28 days       Objective     /76 (BP Location: Left arm, Patient Position: Sitting, Cuff Size: Standard)   Pulse 78   Temp 98 1 °F (36 7 °C) (Temporal)   Resp 18   Ht 5' 11\" (1 803 m)   Wt 73 5 kg (162 lb)   SpO2 98%   BMI 22 59 kg/m²     Physical Exam  Constitutional:       General: He is not in acute distress  Appearance: Normal appearance  Cardiovascular:      Rate and Rhythm: Normal rate and regular rhythm  Pulses: Normal pulses  Heart sounds: Normal heart sounds     Pulmonary:      Effort: Pulmonary effort is normal  No respiratory " distress  Breath sounds: Normal breath sounds  Musculoskeletal:         General: No swelling or tenderness  Right lower leg: No edema  Left lower leg: No edema  Skin:     General: Skin is warm  Neurological:      General: No focal deficit present  Mental Status: He is alert and oriented to person, place, and time       [facility]

## 2023-06-01 NOTE — PATIENT INSTRUCTIONS
Haloperidol (By injection)   Haloperidol (noah-oh-PER-i-dol)  Treats schizophrenia  Brand Name(s): Haldol Decanoate, Haloperidol Decanoate Novaplus   There may be other brand names for this medicine  When This Medicine Should Not Be Used: This medicine is not right for everyone  You should not receive it if you had an allergic reaction to haloperidol, or if you have Parkinson disease, or Dementia with Lewy Bodies  How to Use This Medicine:   Injectable  Your doctor will prescribe your exact dose and tell you how often it should be given  This medicine is given as a shot into one of your muscles  A nurse or other health provider will give you this medicine  Drugs and Foods to Avoid:   Ask your doctor or pharmacist before using any other medicine, including over-the-counter medicines, vitamins, and herbal products  Some medicines can affect how haloperidol works  Tell your doctor if you are using any of the following:  Alprazolam, buspirone, carbidopa/levodopa, chlorpromazine, citalopram, desipramine, erythromycin, fluoxetine, fluvoxamine, imipramine, itraconazole, ketoconazole, levofloxacin, lithium, methadone, nefazodone, paroxetine, promethazine, quinidine, rifampin, ritonavir, sertraline, [place]'s wort, venlafaxine, ziprasidone  Blood thinner (including phenindione, warfarin)  Diuretics (water pill)  Medicine for heart rhythm problems  Medicine for seizures (including carbamazepine, phenobarbital, phenytoin)  Steroid medicine  Tell your doctor if you use anything else that makes you sleepy  Some examples are allergy medicine, narcotic pain medicine, and alcohol  Do not drink alcohol while you are using this medicine    Warnings While Using This Medicine:   Tell your doctor if you are pregnant, or if you have kidney disease, liver disease, bone marrow problems, heart or blood vessel disease, heart rhythm problems (including long QT syndrome), allergies, thyroid problems, or a history of breast cancer or seizures  Do not breastfeed while you are using this medicine  This medicine may cause the following problems:  Heart rhythm changes  Tardive dyskinesia (a muscle disorder that could become permanent)  Neuroleptic malignant syndrome (a nerve disorder that could be life-threatening)  Bronchitis or pneumonia  Extrapyramidal reaction (a muscle and nerve disorder)  This medicine may make you dizzy or drowsy, or may cause trouble with thinking or controlling body movements, which may lead to falls, fractures, or other injuries  Do not drive or do anything else that could be dangerous until you know how this medicine affects you  Do not stop receiving Haldol® suddenly without asking your doctor  You may need to slowly decrease your dose before stopping it completely  This medicine may make you bleed, bruise, or get infections more easily  Take precautions to prevent illness and injury  Wash your hands often  Your doctor will do lab tests at regular visits to check on the effects of this medicine  Keep all appointments  Possible Side Effects While Using This Medicine:   Call your doctor right away if you notice any of these side effects:   Allergic reaction: Itching or hives, swelling in your face or hands, swelling or tingling in your mouth or throat, chest tightness, trouble breathing  Fast, slow, or uneven heartbeat  Fever, cough, trouble breathing  Jerky muscle movement you cannot control (often in your face, tongue, or jaw)  Lightheadedness, dizziness, fainting  Muscle spasms, trouble swallowing  Sweating, confusion, muscle stiffness  Twitching or muscle movements you cannot control, problems with balance or walking  Unusual bleeding, bruising, or weakness  If you notice these less serious side effects, talk with your doctor:   Breast pain or swelling  Constipation  Drowsiness  Dry mouth  Pain, swelling, or redness where the shot was given  If you notice other side effects that you think are caused by this medicine, tell your doctor  Call your doctor for medical advice about side effects  You may report side effects to FDA at 1-653-FDA-5825  © Copyright Carmen Addison 2022 Information is for End User's use only and may not be sold, redistributed or otherwise used for commercial purposes  The above information is an  only  It is not intended as medical advice for individual conditions or treatments  Talk to your doctor, nurse or pharmacist before following any medical regimen to see if it is safe and effective for you

## 2023-06-29 ENCOUNTER — OFFICE VISIT (OUTPATIENT)
Dept: FAMILY MEDICINE CLINIC | Facility: CLINIC | Age: 33
End: 2023-06-29

## 2023-06-29 VITALS
RESPIRATION RATE: 16 BRPM | WEIGHT: 166 LBS | HEART RATE: 91 BPM | DIASTOLIC BLOOD PRESSURE: 70 MMHG | SYSTOLIC BLOOD PRESSURE: 120 MMHG | BODY MASS INDEX: 23.24 KG/M2 | TEMPERATURE: 97.6 F | HEIGHT: 71 IN | OXYGEN SATURATION: 98 %

## 2023-06-29 DIAGNOSIS — F20.0 PARANOID SCHIZOPHRENIA (HCC): Primary | Chronic | ICD-10-CM

## 2023-06-29 PROCEDURE — 99213 OFFICE O/P EST LOW 20 MIN: CPT | Performed by: FAMILY MEDICINE

## 2023-06-29 NOTE — ASSESSMENT & PLAN NOTE
Assessment   Schizophrenia stable from medical stand point   Frequent office visit for Haldol 50 mg IM administration, denies side effects, compliant. Last dose administrated on 6/1/2023. Report working 8h shifts for 4-5 days. Plan   Haldol 50 mg administered today, reassess in 28 days. To call the office if extrapyramidal symptoms including tremors. ED precautions for tachycardia, labile blood pressure, tachypnea, muscular rigidity, hyperthermia or mental status change.

## 2023-06-29 NOTE — PROGRESS NOTES
Name: Kavya Parr      : 1990      MRN: [id number]  Encounter Provider: Jaclyn Glass MD  Encounter Date: 2023   Encounter department: [address]     1. Paranoid schizophrenia ([address])  Assessment & Plan:  Assessment   Schizophrenia stable from medical stand point   Frequent office visit for Haldol 50 mg IM administration, denies side effects, compliant. Last dose administrated on 2023. Report working 8h shifts for 4-5 days. Plan   Haldol 50 mg administered today, reassess in 28 days. To call the office if extrapyramidal symptoms including tremors. ED precautions for tachycardia, labile blood pressure, tachypnea, muscular rigidity, hyperthermia or mental status change. Subjective        It was a pleasure to Drew Terry, a 34 y.o.  male who presents for Haldol injection and management of his chronic medical condition(s).    Patient's medical conditions are stable unless noted otherwise above.  Patient has not had any recent hospitalizations, or medical emergencies since last visit. Patient reports compliance with his medications, denies side effects. Overall patient reports feeling well with no further complaint(s) other than what is mentioned. Review of Systems   Constitutional: Negative for activity change, appetite change, chills, fatigue and fever. HENT: Negative for congestion, postnasal drip, rhinorrhea and sore throat. Eyes: Negative for visual disturbance. Respiratory: Negative for cough, chest tightness, shortness of breath and wheezing. Cardiovascular: Negative for chest pain, palpitations and leg swelling. Gastrointestinal: Negative for abdominal distention, abdominal pain, blood in stool, constipation, diarrhea, nausea and vomiting. Genitourinary: Negative for difficulty urinating, dysuria and hematuria. Musculoskeletal: Negative for arthralgias and myalgias.    Skin: Negative for rash. Neurological: Negative for dizziness, syncope, weakness, light-headedness, numbness and headaches. Psychiatric/Behavioral: Negative for agitation, behavioral problems, self-injury, sleep disturbance and suicidal ideas. Current Outpatient Medications on File Prior to Visit   Medication Sig   • benztropine (COGENTIN) 0.5 mg tablet TAKE ONE TABLET BY MOUTH TWICE DAILY   • clotrimazole (LOTRIMIN) 1 % cream Apply topically 2 (two) times a day   • diphenhydrAMINE (BENADRYL) 25 mg tablet Take 1 tablet (25 mg total) by mouth daily at bedtime as needed (If EPS symptoms)   • haloperidol decanoate (Haldol Decanoate) 50 mg/mL injection Inject 1 mL (50 mg total) into a muscle every 28 days       Objective     /70 (BP Location: Left arm, Patient Position: Sitting, Cuff Size: Standard)   Pulse 91   Temp 97.6 °F (36.4 °C) (Temporal)   Resp 16   Ht 5' 11" (1.803 m)   Wt 75.3 kg (166 lb)   SpO2 98%   BMI 23.15 kg/m²     Physical Exam  Vitals and nursing note reviewed. Constitutional:       General: He is not in acute distress. Appearance: He is well-developed. He is not ill-appearing, toxic-appearing or diaphoretic. HENT:      Head: Normocephalic and atraumatic. Eyes:      General: No scleral icterus. Extraocular Movements: Extraocular movements intact. Cardiovascular:      Rate and Rhythm: Normal rate and regular rhythm. No extrasystoles are present. Pulses:           Radial pulses are 2+ on the right side and 2+ on the left side. Heart sounds: Normal heart sounds, S1 normal and S2 normal. No murmur heard. No friction rub. No gallop. Pulmonary:      Effort: Pulmonary effort is normal. No respiratory distress. Breath sounds: Normal breath sounds and air entry. Abdominal:      General: Bowel sounds are normal.      Palpations: Abdomen is soft. There is no mass. Tenderness: There is no abdominal tenderness.  There is no right CVA tenderness, left CVA tenderness, guarding or rebound. Musculoskeletal:         General: No swelling, tenderness, deformity or signs of injury. Normal range of motion. Cervical back: Normal range of motion. Right lower leg: No edema. Left lower leg: No edema. Feet:      Right foot:      Skin integrity: No ulcer, skin breakdown, erythema, warmth, callus or dry skin. Left foot:      Skin integrity: No ulcer, skin breakdown, erythema, warmth, callus or dry skin. Skin:     General: Skin is warm. Findings: No rash. Neurological:      General: No focal deficit present. Mental Status: He is alert. Cranial Nerves: Cranial nerves 2-12 are intact. Sensory: Sensation is intact. Motor: Motor function is intact. No weakness, tremor, atrophy, abnormal muscle tone, seizure activity or pronator drift. Coordination: Coordination is intact. Deep Tendon Reflexes:      Reflex Scores:       Patellar reflexes are 2+ on the right side and 2+ on the left side. Psychiatric:         Attention and Perception: Attention and perception normal.         Mood and Affect: Mood is not anxious, depressed or elated. Affect is not labile, blunt, flat, angry, tearful or inappropriate. Speech: Speech normal.         Behavior: Behavior is not agitated, slowed, aggressive or hyperactive. Behavior is cooperative. Thought Content: Thought content is not paranoid or delusional. Thought content does not include homicidal or suicidal ideation. Thought content does not include homicidal or suicidal plan.        Cal Georges MD

## 2023-07-27 ENCOUNTER — OFFICE VISIT (OUTPATIENT)
Dept: FAMILY MEDICINE CLINIC | Facility: CLINIC | Age: 33
End: 2023-07-27

## 2023-07-27 VITALS
DIASTOLIC BLOOD PRESSURE: 80 MMHG | SYSTOLIC BLOOD PRESSURE: 128 MMHG | BODY MASS INDEX: 23.3 KG/M2 | TEMPERATURE: 97.8 F | HEIGHT: 71 IN | HEART RATE: 94 BPM | RESPIRATION RATE: 18 BRPM | OXYGEN SATURATION: 98 % | WEIGHT: 166.4 LBS

## 2023-07-27 DIAGNOSIS — F20.0 PARANOID SCHIZOPHRENIA (HCC): Primary | Chronic | ICD-10-CM

## 2023-07-27 PROCEDURE — 99213 OFFICE O/P EST LOW 20 MIN: CPT | Performed by: FAMILY MEDICINE

## 2023-07-27 NOTE — PROGRESS NOTES
Name: Sydni Leone      : 1990      MRN: [id number]  Encounter Provider: Olayinka Castillo MD  Encounter Date: 2023   Encounter department: [address]     1. Paranoid schizophrenia ([address])  Assessment & Plan:  Assessment   Schizophrenia stable from medical stand point   Frequent office visit for Haldol 50 mg IM administration, denies side effects, compliant. Last dose administrated on 2023. Report working 8h shifts for 4-5 days.      Plan   Haldol 50 mg administered today, reassess in 28 days. To call the office if extrapyramidal symptoms including tremors. ED precautions for tachycardia, labile blood pressure, tachypnea, muscular rigidity, hyperthermia or mental status change. Subjective        It was a pleasure to Drew Terry, a 34 y.o.  male who presents for Haldol injection and management of his chronic medical condition(s).    Patient's medical conditions are stable unless noted otherwise above.  Patient has not had any recent hospitalizations, or medical emergencies since last visit. Patient reports compliance with his medications, denies side effects. Overall patient reports feeling well with no further complaint(s) other than what is mentioned. Review of Systems   Constitutional: Negative for activity change, appetite change, chills, fatigue and fever. HENT: Negative for congestion, ear pain, postnasal drip, rhinorrhea and sore throat. Eyes: Negative for pain and visual disturbance. Respiratory: Negative for cough, chest tightness, shortness of breath and wheezing. Cardiovascular: Negative for chest pain, palpitations and leg swelling. Gastrointestinal: Negative for abdominal distention, abdominal pain, blood in stool, constipation, diarrhea, nausea and vomiting. Genitourinary: Negative for difficulty urinating, dysuria and hematuria.    Musculoskeletal: Negative for arthralgias, back pain and myalgias. Skin: Negative for color change and rash. Neurological: Negative for dizziness, seizures, syncope, weakness, light-headedness, numbness and headaches. Psychiatric/Behavioral: Negative for agitation, behavioral problems, self-injury, sleep disturbance and suicidal ideas. All other systems reviewed and are negative. Current Outpatient Medications on File Prior to Visit   Medication Sig   • benztropine (COGENTIN) 0.5 mg tablet TAKE ONE TABLET BY MOUTH TWICE DAILY   • clotrimazole (LOTRIMIN) 1 % cream Apply topically 2 (two) times a day   • diphenhydrAMINE (BENADRYL) 25 mg tablet Take 1 tablet (25 mg total) by mouth daily at bedtime as needed (If EPS symptoms)   • haloperidol decanoate (Haldol Decanoate) 50 mg/mL injection Inject 1 mL (50 mg total) into a muscle every 28 days       Objective     /80 (BP Location: Right arm, Patient Position: Sitting, Cuff Size: Standard)   Pulse 94   Temp 97.8 °F (36.6 °C) (Temporal)   Resp 18   Ht 5' 11" (1.803 m)   Wt 75.5 kg (166 lb 6.4 oz)   SpO2 98%   BMI 23.21 kg/m²     Physical Exam  Vitals and nursing note reviewed. Constitutional:       General: He is not in acute distress. Appearance: He is well-developed. He is not ill-appearing, toxic-appearing or diaphoretic. HENT:      Head: Normocephalic and atraumatic. Eyes:      General: No scleral icterus. Extraocular Movements: Extraocular movements intact. Cardiovascular:      Rate and Rhythm: Normal rate and regular rhythm. No extrasystoles are present. Pulses:           Radial pulses are 2+ on the right side and 2+ on the left side. Heart sounds: Normal heart sounds, S1 normal and S2 normal. No murmur heard. No friction rub. No gallop. Pulmonary:      Effort: Pulmonary effort is normal. No respiratory distress. Breath sounds: Normal breath sounds and air entry. Abdominal:      General: Bowel sounds are normal.      Palpations: Abdomen is soft.  There is no mass. Tenderness: There is no abdominal tenderness. There is no right CVA tenderness, left CVA tenderness, guarding or rebound. Musculoskeletal:         General: No swelling, tenderness, deformity or signs of injury. Normal range of motion. Cervical back: Normal range of motion. Right lower leg: No edema. Left lower leg: No edema. Feet:      Right foot:      Skin integrity: No ulcer, skin breakdown, erythema, warmth, callus or dry skin. Left foot:      Skin integrity: No ulcer, skin breakdown, erythema, warmth, callus or dry skin. Skin:     General: Skin is warm. Findings: No rash. Neurological:      General: No focal deficit present. Mental Status: He is alert. Psychiatric:         Attention and Perception: Attention and perception normal. He is attentive. He does not perceive auditory or visual hallucinations. Mood and Affect: Mood normal. Affect is blunt and flat. Speech: Speech normal.         Behavior: Behavior is not agitated, slowed, aggressive, withdrawn, hyperactive or combative. Behavior is cooperative. Thought Content: Thought content is not paranoid or delusional. Thought content does not include homicidal or suicidal ideation. Thought content does not include homicidal or suicidal plan.        Sara Rodriguez MD

## 2023-07-27 NOTE — ASSESSMENT & PLAN NOTE
Assessment   Schizophrenia stable from medical stand point   Frequent office visit for Haldol 50 mg IM administration, denies side effects, compliant. Last dose administrated on 6/29/2023. Report working 8h shifts for 4-5 days.      Plan   Haldol 50 mg administered today, reassess in 28 days. To call the office if extrapyramidal symptoms including tremors. ED precautions for tachycardia, labile blood pressure, tachypnea, muscular rigidity, hyperthermia or mental status change.

## 2023-10-06 ENCOUNTER — OFFICE VISIT (OUTPATIENT)
Dept: FAMILY MEDICINE CLINIC | Facility: CLINIC | Age: 33
End: 2023-10-06

## 2023-10-06 VITALS
TEMPERATURE: 98 F | HEIGHT: 71 IN | WEIGHT: 167.5 LBS | DIASTOLIC BLOOD PRESSURE: 78 MMHG | SYSTOLIC BLOOD PRESSURE: 110 MMHG | BODY MASS INDEX: 23.45 KG/M2 | OXYGEN SATURATION: 97 % | RESPIRATION RATE: 18 BRPM | HEART RATE: 103 BPM

## 2023-10-06 DIAGNOSIS — F20.0 PARANOID SCHIZOPHRENIA (HCC): Primary | Chronic | ICD-10-CM

## 2023-10-06 DIAGNOSIS — K59.01 SLOW TRANSIT CONSTIPATION: ICD-10-CM

## 2023-10-06 PROCEDURE — 99213 OFFICE O/P EST LOW 20 MIN: CPT | Performed by: FAMILY MEDICINE

## 2023-10-06 RX ORDER — HALOPERIDOL DECANOATE 50 MG/ML
50 INJECTION INTRAMUSCULAR
Qty: 1 ML | Refills: 5 | Status: SHIPPED | OUTPATIENT
Start: 2023-10-06

## 2023-10-06 RX ORDER — SENNA AND DOCUSATE SODIUM 50; 8.6 MG/1; MG/1
1 TABLET, FILM COATED ORAL DAILY
Qty: 30 TABLET | Refills: 1 | Status: SHIPPED | OUTPATIENT
Start: 2023-10-06

## 2023-10-06 NOTE — PROGRESS NOTES
Name: Roxy Ramsey      : 1990      MRN: [id number]  Encounter Provider: Fatmata Blake MD  Encounter Date: 10/6/2023   Encounter department: [address] [facility]     1. Paranoid schizophrenia [facility])  Assessment & Plan:  Patient is doing well from schizophrenia perspective. Not experiencing internal stimuli, acting appropriately during visit. Patient plans on continuing monthly injections, does not have access to insurance at this time due to his immigration status and will continue getting psychiatric care in our office as opposed to psychiatry due to this. - 50 mg IM Haldol Decanoate was injected patient's left deltoid  - Patient tolerated well, plan follow-up in 1 month    Orders:  -     haloperidol decanoate (Haldol Decanoate) 50 mg/mL injection; 1 mL (50 mg total) by Intramuscular - haloperidol decanoate route every 28 days    2. Slow transit constipation  Assessment & Plan:  Patient reports he is having constipation intermittently. He denies any blood in the stool or other concerning symptoms at this time. - We will trial senna docusate for this issue    Orders:  -     senna-docusate sodium (SENOKOT-S) 8.6-50 mg per tablet; Take 1 tablet by mouth daily      Depression Screening and Follow-up Plan: Patient was screened for depression during today's encounter. They screened negative with a PHQ-2 score of 0. Subjective      Patient is 72-year-old male with schizophrenia here for IM injection of Haldol    Patient reports she is doing well working night shift currently. Denies experiencing internal stimuli currently. Review of Systems   Constitutional: Negative for fatigue and fever. Respiratory: Negative for shortness of breath. Gastrointestinal: Negative for abdominal pain, constipation and diarrhea. Genitourinary: Negative for dysuria. Musculoskeletal: Negative for arthralgias and myalgias. Skin: Negative for rash. Neurological: Negative for dizziness. Psychiatric/Behavioral: Negative for agitation, behavioral problems, decreased concentration, dysphoric mood, hallucinations, sleep disturbance and suicidal ideas. The patient is not nervous/anxious. Current Outpatient Medications on File Prior to Visit   Medication Sig   • benztropine (COGENTIN) 0.5 mg tablet TAKE ONE TABLET BY MOUTH TWICE DAILY   • clotrimazole (LOTRIMIN) 1 % cream Apply topically 2 (two) times a day   • diphenhydrAMINE (BENADRYL) 25 mg tablet Take 1 tablet (25 mg total) by mouth daily at bedtime as needed (If EPS symptoms)   • haloperidol decanoate (Haldol Decanoate) 50 mg/mL injection Inject 1 mL (50 mg total) into a muscle every 28 days       Objective     /78 (BP Location: Right arm, Patient Position: Sitting, Cuff Size: Standard)   Pulse 103   Temp 98 °F (36.7 °C) (Temporal)   Resp 18   Ht 5' 11" (1.803 m)   Wt 76 kg (167 lb 8 oz)   SpO2 97%   BMI 23.36 kg/m²     Physical Exam  Vitals reviewed. Constitutional:       Appearance: Normal appearance. HENT:      Head: Normocephalic. Nose: Nose normal.   Eyes:      Extraocular Movements: Extraocular movements intact. Pupils: Pupils are equal, round, and reactive to light. Cardiovascular:      Rate and Rhythm: Normal rate. Pulmonary:      Effort: Pulmonary effort is normal.   Musculoskeletal:         General: Normal range of motion. Cervical back: Normal range of motion. Neurological:      General: No focal deficit present. Mental Status: He is alert and oriented to person, place, and time. Psychiatric:         Mood and Affect: Mood normal.         Behavior: Behavior normal.         Thought Content: Thought content normal.         Judgment: Judgment normal.     Portions of the record were created with voice recognition software.   Occasional wrong word or "sound a like" substitutions may have occurred due to the inherent limitations of voice recognition software. Read the chart carefully and recognize, using context, where substitutions have occurred.       Barron Pineda MD

## 2023-10-06 NOTE — ASSESSMENT & PLAN NOTE
Patient is doing well from schizophrenia perspective. Not experiencing internal stimuli, acting appropriately during visit. Patient plans on continuing monthly injections, does not have access to insurance at this time due to his immigration status and will continue getting psychiatric care in our office as opposed to psychiatry due to this.   - 50 mg IM Haldol Decanoate was injected patient's left deltoid  - Patient tolerated well, plan follow-up in 1 month

## 2023-10-13 NOTE — PROGRESS NOTES
16 Pt visible on unit, appears agitated at times through verbal expression  Cooperative, expresses wanting to be discharged  Positive staff interactions  Redirectable  Offered and accepted prn haldol, effective

## 2023-10-31 DIAGNOSIS — F20.0 PARANOID SCHIZOPHRENIA (HCC): Chronic | ICD-10-CM

## 2023-10-31 RX ORDER — DIPHENHYDRAMINE HCL 25 MG
25 TABLET ORAL
Qty: 30 TABLET | Refills: 1 | Status: SHIPPED | OUTPATIENT
Start: 2023-10-31

## 2024-06-08 ENCOUNTER — APPOINTMENT (OUTPATIENT)
Dept: LAB | Facility: CLINIC | Age: 34
End: 2024-06-08
Payer: COMMERCIAL

## 2024-06-08 ENCOUNTER — TRANSCRIBE ORDERS (OUTPATIENT)
Dept: LAB | Facility: CLINIC | Age: 34
End: 2024-06-08

## 2024-06-08 DIAGNOSIS — D49.2 ODONTOGENIC TUMOR: Primary | ICD-10-CM

## 2024-06-08 DIAGNOSIS — D49.2 ODONTOGENIC TUMOR: ICD-10-CM

## 2024-06-08 LAB
ANION GAP SERPL CALCULATED.3IONS-SCNC: 5 MMOL/L (ref 4–13)
BUN SERPL-MCNC: 9 MG/DL (ref 5–25)
CALCIUM SERPL-MCNC: 9 MG/DL (ref 8.4–10.2)
CHLORIDE SERPL-SCNC: 106 MMOL/L (ref 96–108)
CO2 SERPL-SCNC: 27 MMOL/L (ref 21–32)
CREAT SERPL-MCNC: 0.83 MG/DL (ref 0.6–1.3)
ERYTHROCYTE [DISTWIDTH] IN BLOOD BY AUTOMATED COUNT: 13.4 % (ref 11.6–15.1)
GFR SERPL CREATININE-BSD FRML MDRD: 116 ML/MIN/1.73SQ M
GLUCOSE P FAST SERPL-MCNC: 88 MG/DL (ref 65–99)
HCT VFR BLD AUTO: 48.2 % (ref 36.5–49.3)
HGB BLD-MCNC: 16.3 G/DL (ref 12–17)
MCH RBC QN AUTO: 31.9 PG (ref 26.8–34.3)
MCHC RBC AUTO-ENTMCNC: 33.8 G/DL (ref 31.4–37.4)
MCV RBC AUTO: 94 FL (ref 82–98)
PLATELET # BLD AUTO: 219 THOUSANDS/UL (ref 149–390)
PMV BLD AUTO: 9.5 FL (ref 8.9–12.7)
POTASSIUM SERPL-SCNC: 4.3 MMOL/L (ref 3.5–5.3)
RBC # BLD AUTO: 5.11 MILLION/UL (ref 3.88–5.62)
SODIUM SERPL-SCNC: 138 MMOL/L (ref 135–147)
WBC # BLD AUTO: 8.49 THOUSAND/UL (ref 4.31–10.16)

## 2024-06-08 PROCEDURE — 36415 COLL VENOUS BLD VENIPUNCTURE: CPT

## 2024-06-08 PROCEDURE — 80048 BASIC METABOLIC PNL TOTAL CA: CPT

## 2024-06-08 PROCEDURE — 85027 COMPLETE CBC AUTOMATED: CPT

## 2024-06-12 ENCOUNTER — HOSPITAL ENCOUNTER (OUTPATIENT)
Facility: HOSPITAL | Age: 34
Setting detail: OUTPATIENT SURGERY
Discharge: HOME | End: 2024-06-12
Attending: PLASTIC SURGERY | Admitting: PLASTIC SURGERY
Payer: COMMERCIAL

## 2024-06-12 ENCOUNTER — ANESTHESIA (OUTPATIENT)
Dept: PERIOP | Facility: HOSPITAL | Age: 34
End: 2024-06-12
Payer: COMMERCIAL

## 2024-06-12 ENCOUNTER — ANESTHESIA EVENT (OUTPATIENT)
Dept: PERIOP | Facility: HOSPITAL | Age: 34
End: 2024-06-12
Payer: COMMERCIAL

## 2024-06-12 VITALS
RESPIRATION RATE: 16 BRPM | HEIGHT: 71 IN | HEART RATE: 68 BPM | BODY MASS INDEX: 23.38 KG/M2 | WEIGHT: 167 LBS | TEMPERATURE: 97.7 F | SYSTOLIC BLOOD PRESSURE: 143 MMHG | DIASTOLIC BLOOD PRESSURE: 98 MMHG | OXYGEN SATURATION: 100 %

## 2024-06-12 DIAGNOSIS — S00.05XA SUPERFICIAL FOREIGN BODY OF SCALP, INITIAL ENCOUNTER: ICD-10-CM

## 2024-06-12 DIAGNOSIS — R22.9 LOCALIZED SWELLING, MASS AND LUMP, UNSPECIFIED: ICD-10-CM

## 2024-06-12 PROCEDURE — 88307 TISSUE EXAM BY PATHOLOGIST: CPT | Performed by: PATHOLOGY

## 2024-06-12 PROCEDURE — 88305 TISSUE EXAM BY PATHOLOGIST: CPT | Performed by: PATHOLOGY

## 2024-06-12 RX ORDER — IBUPROFEN 600 MG/1
600 TABLET ORAL EVERY 6 HOURS PRN
Status: DISCONTINUED | OUTPATIENT
Start: 2024-06-12 | End: 2024-06-12 | Stop reason: HOSPADM

## 2024-06-12 RX ORDER — PROPOFOL 10 MG/ML
INJECTION, EMULSION INTRAVENOUS AS NEEDED
Status: DISCONTINUED | OUTPATIENT
Start: 2024-06-12 | End: 2024-06-12

## 2024-06-12 RX ORDER — FENTANYL CITRATE 50 UG/ML
INJECTION, SOLUTION INTRAMUSCULAR; INTRAVENOUS AS NEEDED
Status: DISCONTINUED | OUTPATIENT
Start: 2024-06-12 | End: 2024-06-12

## 2024-06-12 RX ORDER — KETAMINE HCL IN NACL, ISO-OSM 100MG/10ML
SYRINGE (ML) INJECTION AS NEEDED
Status: DISCONTINUED | OUTPATIENT
Start: 2024-06-12 | End: 2024-06-12

## 2024-06-12 RX ORDER — LIDOCAINE HYDROCHLORIDE 10 MG/ML
INJECTION, SOLUTION EPIDURAL; INFILTRATION; INTRACAUDAL; PERINEURAL AS NEEDED
Status: DISCONTINUED | OUTPATIENT
Start: 2024-06-12 | End: 2024-06-12

## 2024-06-12 RX ORDER — GINSENG 100 MG
CAPSULE ORAL AS NEEDED
Status: DISCONTINUED | OUTPATIENT
Start: 2024-06-12 | End: 2024-06-12 | Stop reason: HOSPADM

## 2024-06-12 RX ORDER — ONDANSETRON 2 MG/ML
INJECTION INTRAMUSCULAR; INTRAVENOUS AS NEEDED
Status: DISCONTINUED | OUTPATIENT
Start: 2024-06-12 | End: 2024-06-12

## 2024-06-12 RX ORDER — MIDAZOLAM HYDROCHLORIDE 2 MG/2ML
INJECTION, SOLUTION INTRAMUSCULAR; INTRAVENOUS AS NEEDED
Status: DISCONTINUED | OUTPATIENT
Start: 2024-06-12 | End: 2024-06-12

## 2024-06-12 RX ORDER — SODIUM CHLORIDE, SODIUM LACTATE, POTASSIUM CHLORIDE, CALCIUM CHLORIDE 600; 310; 30; 20 MG/100ML; MG/100ML; MG/100ML; MG/100ML
INJECTION, SOLUTION INTRAVENOUS CONTINUOUS PRN
Status: DISCONTINUED | OUTPATIENT
Start: 2024-06-12 | End: 2024-06-12

## 2024-06-12 RX ORDER — ONDANSETRON 2 MG/ML
4 INJECTION INTRAMUSCULAR; INTRAVENOUS ONCE AS NEEDED
Status: DISCONTINUED | OUTPATIENT
Start: 2024-06-12 | End: 2024-06-12 | Stop reason: HOSPADM

## 2024-06-12 RX ORDER — FENTANYL CITRATE/PF 50 MCG/ML
50 SYRINGE (ML) INJECTION
Status: DISCONTINUED | OUTPATIENT
Start: 2024-06-12 | End: 2024-06-12 | Stop reason: HOSPADM

## 2024-06-12 RX ORDER — ONDANSETRON 4 MG/1
4 TABLET, ORALLY DISINTEGRATING ORAL EVERY 6 HOURS PRN
Status: DISCONTINUED | OUTPATIENT
Start: 2024-06-12 | End: 2024-06-12 | Stop reason: HOSPADM

## 2024-06-12 RX ORDER — MAGNESIUM HYDROXIDE 1200 MG/15ML
LIQUID ORAL AS NEEDED
Status: DISCONTINUED | OUTPATIENT
Start: 2024-06-12 | End: 2024-06-12 | Stop reason: HOSPADM

## 2024-06-12 RX ORDER — DEXAMETHASONE SODIUM PHOSPHATE 10 MG/ML
INJECTION, SOLUTION INTRAMUSCULAR; INTRAVENOUS AS NEEDED
Status: DISCONTINUED | OUTPATIENT
Start: 2024-06-12 | End: 2024-06-12

## 2024-06-12 RX ADMIN — PROPOFOL 50 MG: 10 INJECTION, EMULSION INTRAVENOUS at 14:51

## 2024-06-12 RX ADMIN — PROPOFOL 200 MG: 10 INJECTION, EMULSION INTRAVENOUS at 14:41

## 2024-06-12 RX ADMIN — LIDOCAINE HYDROCHLORIDE 50 MG: 10 INJECTION, SOLUTION EPIDURAL; INFILTRATION; INTRACAUDAL; PERINEURAL at 14:40

## 2024-06-12 RX ADMIN — FENTANYL CITRATE 50 MCG: 50 INJECTION, SOLUTION INTRAMUSCULAR; INTRAVENOUS at 14:40

## 2024-06-12 RX ADMIN — ONDANSETRON 4 MG: 2 INJECTION INTRAMUSCULAR; INTRAVENOUS at 14:35

## 2024-06-12 RX ADMIN — SODIUM CHLORIDE, SODIUM LACTATE, POTASSIUM CHLORIDE, AND CALCIUM CHLORIDE: .6; .31; .03; .02 INJECTION, SOLUTION INTRAVENOUS at 14:39

## 2024-06-12 RX ADMIN — DEXMEDETOMIDINE HYDROCHLORIDE 12 MCG: 100 INJECTION, SOLUTION INTRAVENOUS at 14:35

## 2024-06-12 RX ADMIN — PROPOFOL 100 MG: 10 INJECTION, EMULSION INTRAVENOUS at 14:49

## 2024-06-12 RX ADMIN — Medication 30 MG: at 14:57

## 2024-06-12 RX ADMIN — MIDAZOLAM 2 MG: 1 INJECTION INTRAMUSCULAR; INTRAVENOUS at 14:35

## 2024-06-12 RX ADMIN — FENTANYL CITRATE 50 MCG: 50 INJECTION, SOLUTION INTRAMUSCULAR; INTRAVENOUS at 15:21

## 2024-06-12 RX ADMIN — DEXAMETHASONE SODIUM PHOSPHATE 10 MG: 10 INJECTION, SOLUTION INTRAMUSCULAR; INTRAVENOUS at 14:42

## 2024-06-12 NOTE — DISCHARGE INSTR - AVS FIRST PAGE
1.  May remove dressings whenever he wants and reapply only if he needs to  2.  May shower without restriction  3.  Return my office 20 June for suture removal

## 2024-06-12 NOTE — ANESTHESIA POSTPROCEDURE EVALUATION
Post-Op Assessment Note    CV Status:  Stable    Pain management: adequate       Mental Status:  Sleepy and arousable   Hydration Status:  Euvolemic   PONV Controlled:  Controlled   Airway Patency:  Patent     Post Op Vitals Reviewed: Yes    No anethesia notable event occurred.    Staff: CRNA               BP   134/77   Temp 97   Pulse 81   Resp 21   SpO2 99

## 2024-06-12 NOTE — NURSING NOTE
Pt  is awake,alert,tolerated diet. Written and verbal instructions given to pt, mother at bedside. Pt verbalizes an understanding of all instructions, all questions were answered, denies pain.

## 2024-06-12 NOTE — OP NOTE
OPERATIVE REPORT  PATIENT NAME: Tiffanie Holland    :  1990  MRN: [id number]  Pt Location:  OR ROOM 08    SURGERY DATE: 2024    Surgeons and Role:     * Luis Yip MD - Primary    Preop and postoperative diagnosis:  1.  Occipital scalp nodules  2.  Mass right axilla    Procedure(s):  EXCISION OCCIPITAL SCALP nodules  Right - EXCISION NODULE OF axilla    Specimen(s):  ID Type Source Tests Collected by Time Destination   1 : RIGHT AXILLA MASS Tissue Mass TISSUE EXAM Luis Yip MD 2024 1501    2 : RIGHT LOWER OCCIPITAL SCALP Tissue Mass TISSUE EXAM Luis Yip MD 2024 1529    3 : RIGHT MID OCCIPITAL SCALP Tissue Mass TISSUE EXAM Luis Yip MD 2024 1530    4 : LEFT MID OCCIPITAL SCALP Tissue Mass TISSUE EXAM Luis Yip MD 2024 1530      Operative history: The patient had a mobile get firm nodule of the right anterior axillary fold that measured about 6 mm in diameter and seem to be totally removed at this time.  More bothersome to him was some firm areas of the occipital scalp that may just be a sequelae of wound healing and some lacerations in this area are from other trauma that he had.  This left him with some firm nodules are scar tissue in these areas.  This involved the left mid occipital scalp that measured about 9 x 11 mm in size that was removed.  The right mid occipital scalp was about 8 mm in diameter.  The right lower occipital scalp had multiple pink nodules and a scar that was about 5 x 11 mm in size.  Each of these areas were removed taking a ellipse of size equal to the defect.    Operative procedure: The patient was taken to the operating room placed supine on the operating table.  He was prepped and draped in usual fashion.  Anesthesia was General via endotracheal tube.  A transverse incision was made over the mass of the right anterior axillary fold.  Subcutaneous tissues were  with the tenotomy scissors.  The mass was found  and extracted.  Hemostasis was achieved with the Bovie.  The area is closed with 3-0 nylon interrupted vertical mattress sutures.    The patient was then placed in a lateral position with his left side down the operating table.  This allowed access to the areas of the occipital scalp.  Each in turn was circumscribed with an oblique ellipse.  Using the Bovie for cutting coagulation purposes each was surrounded into normal subcutaneous tissues to allow their removal.  Each area was closed with 3-0 nylon interrupted simple sutures.  Antibiotic ointment and light dressings were placed on the incisions made in the occipital scalp area.  The patient tolerated these procedures well and was taken to the recovery area in good condition.        SIGNATURE: Luis Yip MD  DATE: June 12, 2024  TIME: 3:50 PM

## 2024-06-12 NOTE — INTERVAL H&P NOTE
H&P reviewed. After examining the patient I find no changes in the patients condition since the H&P had been written.    Vitals:    06/12/24 1124   BP: 135/73   Pulse: 85   Resp: 18   Temp: (!) 97 °F (36.1 °C)   SpO2: 97%

## 2024-06-12 NOTE — ANESTHESIA PREPROCEDURE EVALUATION
Procedure:  EXCISION SCALP FOREIGN BODY (Head)  EXCISION NODULE OF ARMPIT (Right: Axilla)    Relevant Problems   NEURO/PSYCH   (+) Paranoid schizophrenia (HCC)      Musculoskeletal and Integument   (+) Tinea corporis      Behavioral Health   (+) Tobacco dependence        Physical Exam    Airway    Mallampati score: III  TM Distance: >3 FB  Neck ROM: full     Dental       Cardiovascular      Pulmonary      Other Findings        Anesthesia Plan  ASA Score- 3     Anesthesia Type- general with ASA Monitors.         Additional Monitors:     Airway Plan: ETT and LMA.           Plan Factors-Exercise tolerance (METS): >4 METS.    Chart reviewed.    Patient summary reviewed.    Patient is a current smoker.  Patient did not smoke on day of surgery.            Induction- intravenous.    Postoperative Plan- Plan for postoperative opioid use.     Perioperative Resuscitation Plan - Level 1 - Full Code.       Informed Consent- Anesthetic plan and risks discussed with patient.  I personally reviewed this patient with the CRNA. Discussed and agreed on the Anesthesia Plan with the CRNA..

## 2024-06-14 ENCOUNTER — NURSE TRIAGE (OUTPATIENT)
Dept: OTHER | Facility: OTHER | Age: 34
End: 2024-06-14

## 2024-06-15 NOTE — TELEPHONE ENCOUNTER
Regarding: Had surgery & medications were not called into pharmacy  ----- Message from Robyn PETERSEN sent at 6/14/2024  9:24 PM EDT -----  '' I had surgery on 6/12 and the medications that were prescribed for me were not called into the pharmacy.''

## 2024-06-15 NOTE — TELEPHONE ENCOUNTER
"Patient had surgery by Dr Yip at [facility] two days ago. Thinks he was supposed to have medications prescribed. Dr Yip not covered by [facility]. Gave patient number for Dr Yip's answering service [phone] but also transferred him. Advised him to contact [facility] for follow up on other medications listed on his AVS. He reports he does not take any medications regularly. Pt verbalized understanding.     Reason for Disposition  • [1] Caller has URGENT medicine question about med that PCP or specialist prescribed AND [2] triager unable to answer question    Answer Assessment - Initial Assessment Questions  1. NAME of MEDICATION: \"What medicine are you calling about?\"      I was supposed to have medications prescribed afte rmy surgery    2. QUESTION: \"What is your question?\" (e.g., medication refill, side effect)      As above, not sure what meds    3. PRESCRIBING HCP: \"Who prescribed it?\" Reason: if prescribed by specialist, call should be referred to that group.      Theodora    Protocols used: Medication Question Call-ADULT-AH    "

## 2024-06-17 NOTE — TELEPHONE ENCOUNTER
Called patient he would not give 3 identifiers, so I could not rely to him that he needs to call Dr. Hardy office, that  Is not a provider with our office.

## 2024-06-18 PROCEDURE — 88305 TISSUE EXAM BY PATHOLOGIST: CPT | Performed by: PATHOLOGY

## 2024-07-02 ENCOUNTER — OFFICE VISIT (OUTPATIENT)
Dept: FAMILY MEDICINE CLINIC | Facility: CLINIC | Age: 34
End: 2024-07-02

## 2024-07-02 ENCOUNTER — APPOINTMENT (OUTPATIENT)
Dept: LAB | Facility: HOSPITAL | Age: 34
End: 2024-07-02
Payer: COMMERCIAL

## 2024-07-02 VITALS
SYSTOLIC BLOOD PRESSURE: 117 MMHG | OXYGEN SATURATION: 97 % | DIASTOLIC BLOOD PRESSURE: 73 MMHG | WEIGHT: 147 LBS | TEMPERATURE: 96.3 F | RESPIRATION RATE: 16 BRPM | HEIGHT: 71 IN | HEART RATE: 90 BPM | BODY MASS INDEX: 20.58 KG/M2

## 2024-07-02 DIAGNOSIS — R74.01 TRANSAMINITIS: ICD-10-CM

## 2024-07-02 DIAGNOSIS — K59.01 SLOW TRANSIT CONSTIPATION: ICD-10-CM

## 2024-07-02 DIAGNOSIS — F17.200 TOBACCO DEPENDENCE: ICD-10-CM

## 2024-07-02 DIAGNOSIS — F20.0 PARANOID SCHIZOPHRENIA (HCC): Primary | ICD-10-CM

## 2024-07-02 PROBLEM — B35.4 TINEA CORPORIS: Status: RESOLVED | Noted: 2023-05-01 | Resolved: 2024-07-02

## 2024-07-02 LAB
ALBUMIN SERPL BCG-MCNC: 4.4 G/DL (ref 3.5–5)
ALP SERPL-CCNC: 90 U/L (ref 34–104)
ALT SERPL W P-5'-P-CCNC: 9 U/L (ref 7–52)
ANION GAP SERPL CALCULATED.3IONS-SCNC: 8 MMOL/L (ref 4–13)
AST SERPL W P-5'-P-CCNC: 12 U/L (ref 13–39)
BILIRUB SERPL-MCNC: 0.37 MG/DL (ref 0.2–1)
BUN SERPL-MCNC: 7 MG/DL (ref 5–25)
CALCIUM SERPL-MCNC: 9.3 MG/DL (ref 8.4–10.2)
CHLORIDE SERPL-SCNC: 102 MMOL/L (ref 96–108)
CO2 SERPL-SCNC: 31 MMOL/L (ref 21–32)
CREAT SERPL-MCNC: 0.86 MG/DL (ref 0.6–1.3)
GFR SERPL CREATININE-BSD FRML MDRD: 114 ML/MIN/1.73SQ M
GLUCOSE P FAST SERPL-MCNC: 61 MG/DL (ref 65–99)
POTASSIUM SERPL-SCNC: 3.4 MMOL/L (ref 3.5–5.3)
PROT SERPL-MCNC: 7.2 G/DL (ref 6.4–8.4)
SODIUM SERPL-SCNC: 141 MMOL/L (ref 135–147)

## 2024-07-02 PROCEDURE — 80053 COMPREHEN METABOLIC PANEL: CPT

## 2024-07-02 PROCEDURE — 99214 OFFICE O/P EST MOD 30 MIN: CPT | Performed by: FAMILY MEDICINE

## 2024-07-02 PROCEDURE — 36415 COLL VENOUS BLD VENIPUNCTURE: CPT

## 2024-07-02 PROCEDURE — 96372 THER/PROPH/DIAG INJ SC/IM: CPT

## 2024-07-02 RX ORDER — HALOPERIDOL DECANOATE 50 MG/ML
50 INJECTION INTRAMUSCULAR ONCE
Status: COMPLETED | OUTPATIENT
Start: 2024-07-02 | End: 2024-07-02

## 2024-07-02 RX ORDER — POLYETHYLENE GLYCOL 3350 17 G/17G
238 POWDER, FOR SOLUTION ORAL DAILY
Status: DISCONTINUED | OUTPATIENT
Start: 2024-07-03 | End: 2024-07-02

## 2024-07-02 RX ORDER — POLYETHYLENE GLYCOL 3350 17 G/17G
238 POWDER, FOR SOLUTION ORAL DAILY
Status: SHIPPED | OUTPATIENT
Start: 2024-07-03 | End: 2024-08-02

## 2024-07-02 RX ADMIN — HALOPERIDOL DECANOATE 50 MG: 50 INJECTION INTRAMUSCULAR at 15:57

## 2024-07-02 NOTE — PROGRESS NOTES
Ambulatory Visit  Name: Tiffanie Holland      : 1990      MRN: [id number]  Encounter Provider: Darling Knight MD  Encounter Date: 2024   [facility]    Assessment & Plan   1. Paranoid schizophrenia (HCC)  Assessment & Plan:  Now has a job at [employer] in [place]   Still awaits approval for his green card - as of 24.  Had been off meds for 7 months. Back under the care of PSYCH. Starting injections today -- has previous h/o non-compliance.  Orders:  -     haloperidol decanoate (Haldol Decanoate) LONG-ACTING IM injection 50 mg  2. Tobacco dependence  Assessment & Plan:  No chronic cough or SOB   Currently, smoking 3 cigars per day.  Encouraged tobacco cessation    3. Transaminitis  Assessment & Plan:  Follow up with CMP   asymptomatic    Orders:  -     Comprehensive metabolic panel; Future; Expected date: 2024  4. Slow transit constipation  Assessment & Plan:  Bowel movement every three days.  No taking medication for constipation    Plan:  Counseled diet changes   Educated regarding toilet training  Polyethylene Glycol 3350 17 g PO qd dissolve in 4-8 oz of water  Orders:  -     polyethylene glycol (GLYCOLAX) bowel prep 238 g       History of Present Illness     A 47 y.o male with a history of paranoid schizophrenia presents today to the clinic with his mother for follow up and to administer medication (haloperidol 50 mg injection). The patient was not in compliance with his medications because he did not have insurance before, however he started with a new insurance and brought his medicine to the clinic ordered by Psychiatric months ago to start it. Pt is presenting with paranoid symptoms, pt stated there is a chip in his brain that is controlling him., Pt was off his medication today, 50 mg IM Haldol Decanoate was injected patient's right deltoid. Patient tolerated well, plan follow-up in 28 days for annual visit and follow up.  "          Review of Systems   Constitutional:  Negative for chills and fever.   HENT:  Negative for ear pain and sore throat.    Eyes:  Negative for pain and visual disturbance.   Respiratory:  Negative for cough and shortness of breath.    Cardiovascular:  Negative for chest pain and palpitations.   Gastrointestinal:  Negative for abdominal pain and vomiting.   Genitourinary:  Negative for dysuria and hematuria.   Musculoskeletal:  Negative for arthralgias.   Skin:  Negative for color change and rash.   Neurological:  Negative for seizures and syncope.   Psychiatric/Behavioral:  Positive for sleep disturbance. Negative for self-injury and suicidal ideas. The patient is nervous/anxious and is hyperactive.    All other systems reviewed and are negative.      Objective     /73 (BP Location: Right arm, Patient Position: Sitting, Cuff Size: Standard)   Pulse 90   Temp (!) 96.3 °F (35.7 °C) (Temporal)   Resp 16   Ht 5' 11\" (1.803 m)   Wt 66.7 kg (147 lb)   SpO2 97%   BMI 20.50 kg/m²     Physical Exam  Vitals and nursing note reviewed.   Constitutional:       General: He is not in acute distress.     Appearance: He is well-developed.   HENT:      Head: Normocephalic and atraumatic.      Comments: Nicely healed scar on the occipital midline about 2 cm without signs of infection.  Eyes:      Conjunctiva/sclera: Conjunctivae normal.   Cardiovascular:      Rate and Rhythm: Normal rate and regular rhythm.      Heart sounds: No murmur heard.  Pulmonary:      Effort: Pulmonary effort is normal. No respiratory distress.      Breath sounds: Normal breath sounds.   Abdominal:      Palpations: Abdomen is soft.      Tenderness: There is no abdominal tenderness.   Musculoskeletal:         General: No swelling.      Cervical back: Neck supple.      Right lower leg: No edema.      Left lower leg: No edema.   Skin:     General: Skin is warm and dry.      Capillary Refill: Capillary refill takes less than 2 seconds. "   Neurological:      General: No focal deficit present.      Mental Status: He is alert and oriented to person, place, and time.      Gait: Gait normal.   Psychiatric:         Mood and Affect: Mood normal.         Behavior: Behavior normal.         Thought Content: Thought content is paranoid. Thought content does not include homicidal or suicidal ideation.       Administrative Statements

## 2024-07-02 NOTE — ASSESSMENT & PLAN NOTE
Now has a job at [employer] in [place]   Still awaits approval for his green card - as of 7/2/24.  Had been off meds for 7 months. Back under the care of PSYCH. Starting injections today -- has previous h/o non-compliance.

## 2024-07-03 NOTE — ASSESSMENT & PLAN NOTE
Bowel movement every three days.  No taking medication for constipation    Plan:  Counseled diet changes   Educated regarding toilet training  Polyethylene Glycol 3350 17 g PO qd dissolve in 4-8 oz of water

## 2024-07-04 ENCOUNTER — TELEPHONE (OUTPATIENT)
Dept: OTHER | Facility: HOSPITAL | Age: 34
End: 2024-07-04

## 2024-07-05 PROBLEM — R74.01 TRANSAMINITIS: Status: RESOLVED | Noted: 2018-10-03 | Resolved: 2024-07-05

## 2024-08-09 ENCOUNTER — HOSPITAL ENCOUNTER (EMERGENCY)
Facility: HOSPITAL | Age: 34
Discharge: HOME | End: 2024-08-09
Attending: EMERGENCY MEDICINE
Payer: COMMERCIAL

## 2024-08-09 VITALS
SYSTOLIC BLOOD PRESSURE: 110 MMHG | OXYGEN SATURATION: 98 % | TEMPERATURE: 99.4 F | RESPIRATION RATE: 18 BRPM | DIASTOLIC BLOOD PRESSURE: 61 MMHG | HEART RATE: 83 BPM

## 2024-08-09 DIAGNOSIS — K62.89 RECTAL PAIN: ICD-10-CM

## 2024-08-09 DIAGNOSIS — F20.0 PARANOID SCHIZOPHRENIA (HCC): Primary | ICD-10-CM

## 2024-08-09 PROCEDURE — 99282 EMERGENCY DEPT VISIT SF MDM: CPT

## 2024-08-09 PROCEDURE — 99284 EMERGENCY DEPT VISIT MOD MDM: CPT | Performed by: EMERGENCY MEDICINE

## 2024-08-09 RX ORDER — DOCUSATE SODIUM 100 MG/1
100 CAPSULE, LIQUID FILLED ORAL EVERY 12 HOURS
Qty: 14 CAPSULE | Refills: 0 | Status: SHIPPED | OUTPATIENT
Start: 2024-08-09 | End: 2024-08-16

## 2024-08-09 NOTE — DISCHARGE INSTRUCTIONS
Hello you were seen today for rectal pain    Please take the medication as prescribed    Please follow-up with colorectal surgery    Please return the emergency department if you develop any fever, abdominal pain, chills, blood in your poop, pain when pooping, or any other symptoms that are concerning to you

## 2024-08-09 NOTE — ED ATTENDING ATTESTATION
8/9/2024  I, Tiburcio Roberson DO, saw and evaluated the patient. I have discussed the patient with the resident/non-physician practitioner and agree with the resident's/non-physician practitioner's findings, Plan of Care, and MDM as documented in the resident's/non-physician practitioner's note, except where noted. All available labs and Radiology studies were reviewed.  I was present for key portions of any procedure(s) performed by the resident/non-physician practitioner and I was immediately available to provide assistance.       At this point I agree with the current assessment done in the Emergency Department.  I have conducted an independent evaluation of this patient a history and physical is as follows:    Patient is a 34-year-old male with history of paranoid schizophrenia, tobacco use, slow transit constipation, presents for a self perceived lump around his rectum.  He has has been there for quite some time, thinks maybe since 2002, says he has chronic constipation which is unchanged.  No abdominal pain, no fever, no chills.  Denies rectal pain, denies rectal bleeding.  Denies SI or HI.    General:  Patient is well-appearing  Head:  Atraumatic  Eyes:  Conjunctiva pink  ENT:  Mucous membranes are moist  Neck:  Supple  Cardiac:  S1-S2, without murmurs  Lungs:  Clear to auscultation bilaterally  Abdomen:  Soft, nontender, normal bowel sounds, no CVA tenderness, no tympany, no rigidity, no guarding, rectal examination performed with ED resident Dr. Lopez and myself together as chaperone's showed no evidence of external rectal lesions, no sign of abscess, no tenderness, no visible lesions.  He points to a area at 3:00 1 cm lateral to the rectum, is where he thinks the bump is, says he feels like it superficial to him.  Extremities:  Normal range of motion  Neurologic:  Awake, fluent speech, normal comprehension. AAOx3.   Skin:  Pink warm and dry  Psychiatric:  Alert, pleasant, cooperative          ED Course      Patient has self perceived small lumbar on the rectum, I do not see evidence of external hemorrhoid, cellulitis, pilonidal cyst or perianal abscess.  Do not believe the patient requires imaging.  Patient recommended to increase his fiber and to follow-up with colorectal surgery.Supportive care, importance of follow-up and return precautions were discussed with the patient, who expressed understanding.    The patient was evaluated for sepsis in the emergency department. After that assessment, at the time of admission, no sepsis, severe sepsis, or septic shock was found.    DIAGNOSIS:  History of paranoid schizophrenia, self perceived rectal nodule    MEDICAL DECISION MAKING CODING    COLLECTION AND INTERPRETATION OF DATA  I reviewed prior external notes, including July 2, 2024 PCP office visit              Critical Care Time  Procedures

## 2024-08-09 NOTE — ED PROVIDER NOTES
"History  Chief Complaint   Patient presents with    Rectal Pain     Pt has c/o lump on his buttocks for \"a long while\".     Tiffanie Holland is a 34 y.o. male who presents to the Emergency department with concern for lump on his rectum. Patient reports a past medical history of none.  Chart reviewed and shows several visits for schizophrenia and associated symptoms.  He says that this first occurred in 2002 when he ate something from a restaurant and it has been bothering him since.  No fever, no chills.  No blood or pus per rectum.  He says it is a painful lump on the right side of his anus.   #[id number] was used for the entirety of this patient encounter.          Prior to Admission Medications   Prescriptions Last Dose Informant Patient Reported? Taking?   benztropine (COGENTIN) 0.5 mg tablet   No No   Sig: TAKE ONE TABLET BY MOUTH TWICE DAILY   Patient not taking: Reported on 7/2/2024   clotrimazole (LOTRIMIN) 1 % cream   No No   Sig: Apply topically 2 (two) times a day   Patient not taking: Reported on 7/2/2024   diphenhydrAMINE (BENADRYL) 25 mg tablet   No No   Sig: TAKE 1 TABLET (25 MG TOTAL) BY MOUTH DAILY AT BEDTIME AS NEEDED (IF EPS SYMPTOMS)   Patient not taking: Reported on 7/2/2024   senna-docusate sodium (SENOKOT-S) 8.6-50 mg per tablet   No No   Sig: Take 1 tablet by mouth daily   Patient not taking: Reported on 7/2/2024      Facility-Administered Medications: None       Past Medical History:   Diagnosis Date    Disease of thyroid gland     Hallucination     Psychiatric disorder     Psychiatric illness     Schizophrenia (HCC)        Past Surgical History:   Procedure Laterality Date    MASS EXCISION Right 6/12/2024    Procedure: EXCISION NODULE OF ARMPIT;  Surgeon: Luis Yip MD;  Location:  MAIN OR;  Service: Plastics    SCALP EXCISION N/A 6/12/2024    Procedure: EXCISION SCALP FOREIGN BODY;  Surgeon: Luis Yip MD;  Location:  MAIN OR;  Service: Plastics       Family History "   Family history unknown: Yes     I have reviewed and agree with the history as documented.    E-Cigarette/Vaping    E-Cigarette Use Never User      E-Cigarette/Vaping Substances    Nicotine No     THC No     CBD No     Flavoring No     Other No     Unknown No      Social History     Tobacco Use    Smoking status: Every Day     Current packs/day: 0.50     Types: Cigarettes     Passive exposure: Current    Smokeless tobacco: Never   Vaping Use    Vaping status: Never Used   Substance Use Topics    Alcohol use: Yes     Alcohol/week: 7.0 - 10.0 standard drinks of alcohol     Types: 7 - 10 Cans of beer per week     Comment: Socially    Drug use: Yes     Types: Marijuana     Comment: pt refused to answer         Review of Systems   Constitutional:  Negative for chills and fever.   Gastrointestinal:  Negative for abdominal pain, anal bleeding, blood in stool, constipation, diarrhea, nausea, rectal pain and vomiting.   Neurological:  Negative for seizures and syncope.   All other systems reviewed and are negative.      Physical Exam  ED Triage Vitals [08/09/24 1802]   Temperature Pulse Respirations Blood Pressure SpO2   99.4 °F (37.4 °C) 83 18 110/61 98 %      Temp Source Heart Rate Source Patient Position - Orthostatic VS BP Location FiO2 (%)   Temporal Monitor Sitting Left arm --      Pain Score       No Pain             Orthostatic Vital Signs  Vitals:    08/09/24 1802   BP: 110/61   Pulse: 83   Patient Position - Orthostatic VS: Sitting       Physical Exam  Exam conducted with a chaperone present (Dr. Roberson).   Constitutional:       General: He is not in acute distress.     Appearance: Normal appearance. He is normal weight. He is not ill-appearing, toxic-appearing or diaphoretic.   HENT:      Nose: Nose normal.   Eyes:      General: No scleral icterus.        Right eye: No discharge.         Left eye: No discharge.      Conjunctiva/sclera: Conjunctivae normal.   Cardiovascular:      Rate and Rhythm: Normal rate.    Pulmonary:      Effort: Pulmonary effort is normal. No respiratory distress.   Genitourinary:      Skin:     General: Skin is warm and dry.      Capillary Refill: Capillary refill takes less than 2 seconds.   Neurological:      General: No focal deficit present.      Mental Status: He is alert and oriented to person, place, and time.         ED Medications  Medications - No data to display    Diagnostic Studies  Results Reviewed       None                   No orders to display         Procedures  Procedures      ED Course                             SBIRT 20yo+      Flowsheet Row Most Recent Value   Initial Alcohol Screen: US AUDIT-C     1. How often do you have a drink containing alcohol? 0 Filed at: 08/09/2024 1803   2. How many drinks containing alcohol do you have on a typical day you are drinking?  0 Filed at: 08/09/2024 1803   3a. Male UNDER 65: How often do you have five or more drinks on one occasion? 0 Filed at: 08/09/2024 1803   3b. FEMALE Any Age, or MALE 65+: How often do you have 4 or more drinks on one occassion? 0 Filed at: 08/09/2024 1803   Audit-C Score 0 Filed at: 08/09/2024 1803   FOX: How many times in the past year have you...    Used an illegal drug or used a prescription medication for non-medical reasons? Never Filed at: 08/09/2024 1803                  Medical Decision Making  Tiffanie Holland is a 34 y.o. male who presents to the emergency department for rectal pain, this is his only medical complaint.    Based on patient's clinical history and physical exam there are no red flag signs or symptoms     I will order appropriate testing to narrow my differential    Differential diagnosis includes but is not limited to: Internal hemorrhoid, external hemorrhoid, pilonidal cyst, perianal abscess, perirectal abscess, paranoid schizophrenia.    Exam of patient's rectum by myself and Dr. Roberson did not reveal any acute cause of what patient is describing.  Was not able to feel any lump around his  anus, areas of tenderness, areas of fluctuance, or signs of infection.  Patient pointed to the 3 o'clock position on his anus where he says the pain is and were not able to find any cause for his pain.    I have a feeling there is a component of patient's paranoid schizophrenia that is contributing to this.  He says that he has been having this issue since 2002 when he ate a weird piece of food at a restaurant and it has been causing pain in his butt since.    Patient was given appropriate return precautions, instructed to follow-up with colorectal, and I did give him some Colace to help him poop easier if needed.  Patient demonstrated understanding.      Risk  OTC drugs.          Disposition  Final diagnoses:   Rectal pain   Paranoid schizophrenia (HCC)     Time reflects when diagnosis was documented in both MDM as applicable and the Disposition within this note       Time User Action Codes Description Comment    8/9/2024  6:36 PM Ronni Lopez Add [K62.89] Rectal pain     8/9/2024  6:37 PM Ronni Lopez Add [F20.0] Paranoid schizophrenia (HCC)     8/9/2024  6:37 PM Ronni Lopez Modify [K62.89] Rectal pain     8/9/2024  6:37 PM Ronni Lopez Modify [F20.0] Paranoid schizophrenia (HCC)           ED Disposition       ED Disposition   Discharge    Condition   Stable    Date/Time   Fri Aug 9, 2024 1836    Comment   [employer] discharge to home/self care.                   Follow-up Information       Follow up With Specialties Details Why Contact Info    [facility] Pod Colon and Rectal Surgery Schedule an appointment as soon as possible for a visit in 1 week  [facility] [facility] [id number] [phone]            Discharge Medication List as of 8/9/2024  6:39 PM        START taking these medications    Details   docusate sodium (COLACE) 100 mg capsule Take 1 capsule (100 mg total) by mouth every 12 (twelve) hours for 7 days, Starting Fri 8/9/2024, Until Fri 8/16/2024,  Normal           CONTINUE these medications which have NOT CHANGED    Details   benztropine (COGENTIN) 0.5 mg tablet TAKE ONE TABLET BY MOUTH TWICE DAILY, Normal      clotrimazole (LOTRIMIN) 1 % cream Apply topically 2 (two) times a day, Starting Mon 5/1/2023, Normal      diphenhydrAMINE (BENADRYL) 25 mg tablet TAKE 1 TABLET (25 MG TOTAL) BY MOUTH DAILY AT BEDTIME AS NEEDED (IF EPS SYMPTOMS), Starting Tue 10/31/2023, Normal      senna-docusate sodium (SENOKOT-S) 8.6-50 mg per tablet Take 1 tablet by mouth daily, Starting Fri 10/6/2023, Normal               PDMP Review       None             ED Provider  Attending physically available and evaluated Tiffanie Holland. I managed the patient along with the ED Attending.    Electronically Signed by           Ronni Lopez MD  08/09/24 3365

## 2024-12-02 ENCOUNTER — HOSPITAL ENCOUNTER (EMERGENCY)
Facility: HOSPITAL | Age: 34
Discharge: HOME | End: 2024-12-02
Attending: EMERGENCY MEDICINE | Admitting: EMERGENCY MEDICINE
Payer: COMMERCIAL

## 2024-12-02 VITALS
RESPIRATION RATE: 18 BRPM | OXYGEN SATURATION: 98 % | SYSTOLIC BLOOD PRESSURE: 128 MMHG | HEART RATE: 101 BPM | TEMPERATURE: 97.5 F | DIASTOLIC BLOOD PRESSURE: 89 MMHG

## 2024-12-02 DIAGNOSIS — R21 RASH: Primary | ICD-10-CM

## 2024-12-02 DIAGNOSIS — Z86.59 HISTORY OF SCHIZOPHRENIA: ICD-10-CM

## 2024-12-02 PROCEDURE — 99282 EMERGENCY DEPT VISIT SF MDM: CPT

## 2024-12-02 PROCEDURE — 99284 EMERGENCY DEPT VISIT MOD MDM: CPT | Performed by: EMERGENCY MEDICINE

## 2024-12-02 RX ORDER — CLOTRIMAZOLE 1 %
CREAM (GRAM) TOPICAL 2 TIMES DAILY
Status: CANCELLED | OUTPATIENT
Start: 2024-12-02

## 2024-12-02 RX ORDER — BENZOCAINE/MENTHOL 6 MG-10 MG
LOZENGE MUCOUS MEMBRANE
Qty: 15 G | Refills: 0 | Status: SHIPPED | OUTPATIENT
Start: 2024-12-02 | End: 2024-12-10 | Stop reason: ALTCHOICE

## 2024-12-02 RX ORDER — BENZOCAINE/MENTHOL 6 MG-10 MG
LOZENGE MUCOUS MEMBRANE ONCE
Status: COMPLETED | OUTPATIENT
Start: 2024-12-02 | End: 2024-12-02

## 2024-12-02 RX ORDER — BENZOCAINE/MENTHOL 6 MG-10 MG
LOZENGE MUCOUS MEMBRANE 4 TIMES DAILY PRN
Status: CANCELLED | OUTPATIENT
Start: 2024-12-02

## 2024-12-02 RX ORDER — GUAIFENESIN 200 MG/10ML
LIQUID ORAL 3 TIMES DAILY PRN
Status: CANCELLED | OUTPATIENT
Start: 2024-12-02

## 2024-12-02 RX ADMIN — HYDROCORTISONE 1 APPLICATION: 1 CREAM TOPICAL at 17:07

## 2024-12-02 NOTE — ED ATTENDING ATTESTATION
"I, Casa Lopez MD, saw and evaluated the patient. I have discussed the patient with the resident and agree with the resident's findings, Plan of Care, and MDM as documented in the resident's note, except where noted. All available labs and Radiology studies were reviewed.  I was present for key portions of any procedure(s) performed by the resident and I was immediately available to provide assistance.    At this point I agree with the current assessment done in the Emergency Department.  I have conducted an independent evaluation of this patient a history and physical is as follows:    33 yo male with a history of paranoid schizophrenia presents to the ED for evaluation of an \"allergic reaction\". The patient is concerned that someone \"poisoned\" the yellow rice he ate this afternoon around 1300. He says that he developed an \"upset stomach\" and lightheadedness shortly after ingesting the rice. No nausea, vomiting, diarrhea, or abdominal pain. He denies fevers/chills. No chest pain, shortness of breath, or diaphoresis. He cannot think of anyone who might want to poison him. (+) Secondary complaint of a mild itchy rash to his bilateral posterior neck \"coming and going\" for the past several months. He has not taken anything for the rash. He adamantly denies SI, HI, and hallucinations. No other specific complaints.    ROS: per resident physician note    Gen: NAD, AA&Ox3  HEENT: PERRL, EOMI  Neck: supple  CV: RRR  Lungs: CTA B/L  Abdomen: soft, NT/ND  Ext: no swelling or deformity  Neuro: 5/5 strength all extremities, sensation grossly intact  Skin: (+) mild scattered maculopapular rash to bilateral posterior neck, no tenderness, no drainage, no erythema/warmth    ED Course  The patient is very well appearing with stable vital signs. Exam is only significant for a mild rash to the posterior neck, intermittent x \"months\". Abdomen is soft and nontender. The patient is requesting that he get tested for \"toxins\" but " does not have any other specific requests. Rash is nonspecific --> possible contact dermatitis vs Malassezia? Will trial a course of topical hydrocortisone. The patient was instructed to follow up with his PCP and/or dermatology later this week. He is agreeable to this plan. Strict return precautions provided.      Critical Care Time  Procedures

## 2024-12-02 NOTE — ED PROVIDER NOTES
Time reflects when diagnosis was documented in both MDM as applicable and the Disposition within this note       Time User Action Codes Description Comment    12/2/2024  4:35 PM Jamie Pino Add [R21] Rash     12/2/2024  4:36 PM Jamie Pino Add [Z71.1] Person with feared complaint in whom no diagnosis was made     12/2/2024  4:36 PM Jamie Pino Remove [Z71.1] Person with feared complaint in whom no diagnosis was made     12/2/2024  4:36 PM Jamie Pino Add [Z86.59] History of schizophrenia           ED Disposition       ED Disposition   Discharge    Condition   Stable    Date/Time   Mon Dec 2, 2024  4:36 PM    Comment   [employer] discharge to home/self care.                   Assessment & Plan       Medical Decision Making  Patient is a 34 y.o. male with PMH of paranoid schizophrenia, who presents to the ED with complaint of feeling like he was poisoned, rash    Vital signs on arrival within normal limits    On exam see physical exam for additional details, patient is alert, no evidence respiratory distress    History and physical exam most consistent with paranoia, nonspecific rash however, differential diagnosis included but not limited to Malassezia, plan I spoke at length with the patient about his current symptomspatient is not able to clearly state why the rash is connected to his concern for poisoning today, but is amenable to treatment for the rash, is denying any desire for testing for evidence of organ damage, states he is only interested in testing of his blood for toxins, we informed that there is no available testing for blood toxins he states that that is disappointing, does not wish for additional symptomatic care regarding feeling of lightheadedness, dizziness, nausea, or fatigue, hydrocortisone cream applied and prescription sent to the pharmacy    View ED course above for further discussion on patient workup.     All labs reviewed and utilized in the medical decision making process  All  radiology studies independently viewed by me and interpreted by the radiologist.  I reviewed all testing with the patient.     Upon re-evaluation Patient continues remain hemodynamically stable with no new symptom/complaint, no evidence of worsening rash, patient continues to use just that he does not desire any further testing regarding your blood work today, except for toxin testing, ambulatory referral to dermatology placed,     Plan for care discussed with patient, patient verbalized understanding, educated on symptoms concerning for return to the emergency department, PCP follow-up recommended.        ED Course as of 12/02/24 1737   Mon Dec 02, 2024   1608 Ate something feels like going to die, this AM, 1 in the afternoon, ate yellow rice / water, never had prior, rash, feels like someone poisoning concern, anxious, felt faint, -pain, -rash, nausea vs belly pain, upset stomach + dizzy, -n/v/d, vision blurry, +tired, nature of dizziness, lightheaded, looking for bloodwork to help typify poison in his blood,         Medications   hydrocortisone 1 % cream (1 Application Topical Given 12/2/24 1707)       ED Risk Strat Scores                                               History of Present Illness       Chief Complaint   Patient presents with    Allergic Reaction     Pt states he believes he's having an allergic rxn to the yellow rice he ate this afternoon. Pt states he wants to check if his food was tampered with. Pt c/o blurred vision and rash to neck        Past Medical History:   Diagnosis Date    Disease of thyroid gland     Hallucination     Psychiatric disorder     Psychiatric illness     Schizophrenia (HCC)       Past Surgical History:   Procedure Laterality Date    MASS EXCISION Right 6/12/2024    Procedure: EXCISION NODULE OF ARMPIT;  Surgeon: Luis Yip MD;  Location:  MAIN OR;  Service: Plastics    SCALP EXCISION N/A 6/12/2024    Procedure: EXCISION SCALP FOREIGN BODY;  Surgeon: Luis  MD Theodora;  Location:  MAIN OR;  Service: Plastics      Family History   Family history unknown: Yes      Social History     Tobacco Use    Smoking status: Every Day     Current packs/day: 0.50     Types: Cigarettes     Passive exposure: Current    Smokeless tobacco: Never   Vaping Use    Vaping status: Never Used   Substance Use Topics    Alcohol use: Yes     Alcohol/week: 7.0 - 10.0 standard drinks of alcohol     Types: 7 - 10 Cans of beer per week     Comment: Socially    Drug use: Yes     Types: Marijuana     Comment: pt refused to answer       E-Cigarette/Vaping    E-Cigarette Use Never User       E-Cigarette/Vaping Substances    Nicotine No     THC No     CBD No     Flavoring No     Other No     Unknown No       I have reviewed and agree with the history as documented.     Patient is reporting symptoms that started today after eating yellow rice around 1 PM, is reporting a feeling of lightheadedness, reporting a feeling of generalized being unwell, patient is also expressing concern that someone poisoned him by poisoning his rice, is presenting to the emergency department requesting testing for toxins in his blood, the patient is not aware of anyone who would want to poison him or why someone would want to poison him, he is not expressing any type of pain, no evidence of organ dysfunction, is breathing normally, has no pain in his chest, no change in his urinary habits, no new onset nausea vomiting or diarrhea, he is describing generalized feeling of lightheadedness, does not feel that the room is spinning, states that the other thing that is concerning him is he is having a rash on his neck, states that is very itchy and does not understand why states that he is doing everything with his healthcare right and does not know why this rash persists        Review of Systems        Objective       ED Triage Vitals [12/02/24 1550]   Temperature Pulse Blood Pressure Respirations SpO2 Patient Position -  Orthostatic VS   97.5 °F (36.4 °C) 101 128/89 18 98 % Lying      Temp Source Heart Rate Source BP Location FiO2 (%) Pain Score    Tympanic Monitor Right arm -- No Pain      Vitals      Date and Time Temp Pulse SpO2 Resp BP Pain Score FACES Pain Rating User   12/02/24 1642 -- -- -- -- -- No Pain -- ST   12/02/24 1550 97.5 °F (36.4 °C) 101 98 % 18 128/89 No Pain -- KV            Physical Exam  Vitals and nursing note reviewed.   Constitutional:       General: He is not in acute distress.     Appearance: He is well-developed. He is ill-appearing. He is not toxic-appearing.      Comments: Patient is mildly cachectic.   HENT:      Head: Normocephalic and atraumatic.      Mouth/Throat:      Mouth: Mucous membranes are moist.      Pharynx: Oropharynx is clear. No oropharyngeal exudate or posterior oropharyngeal erythema.   Eyes:      Conjunctiva/sclera: Conjunctivae normal.   Cardiovascular:      Rate and Rhythm: Normal rate and regular rhythm.      Heart sounds: No murmur heard.  Pulmonary:      Effort: Pulmonary effort is normal. No respiratory distress.      Breath sounds: Normal breath sounds. No wheezing, rhonchi or rales.   Abdominal:      Palpations: Abdomen is soft.      Tenderness: There is no abdominal tenderness.   Musculoskeletal:         General: No swelling.      Cervical back: Neck supple.   Skin:     General: Skin is warm and dry.      Capillary Refill: Capillary refill takes less than 2 seconds.      Findings: Rash present. No erythema.      Comments: There is a rash to the sides of the neck bilaterally, it appears consistent with dry skin, small patches/papules which are not erythematous, no active pustule, no evidence of overlying infection, patient states they are itchy, no evidence of hives, no evidence of swelling mucosal or, patient without difficulty breathing, lungs clear to auscultation, tolerating secretions without difficulty   Neurological:      General: No focal deficit present.      Mental  Status: He is alert.   Psychiatric:         Mood and Affect: Mood normal.      Comments: Patient does not seem flattened affect, speaks very rapidly, is concerned about people people poisoning him, continues to affirm he desires testing for toxins in his blood         Results Reviewed       None            No orders to display       Procedures    ED Medication and Procedure Management   Prior to Admission Medications   Prescriptions Last Dose Informant Patient Reported? Taking?   benztropine (COGENTIN) 0.5 mg tablet   No No   Sig: TAKE ONE TABLET BY MOUTH TWICE DAILY   Patient not taking: Reported on 7/2/2024   clotrimazole (LOTRIMIN) 1 % cream   No No   Sig: Apply topically 2 (two) times a day   Patient not taking: Reported on 7/2/2024   diphenhydrAMINE (BENADRYL) 25 mg tablet   No No   Sig: TAKE 1 TABLET (25 MG TOTAL) BY MOUTH DAILY AT BEDTIME AS NEEDED (IF EPS SYMPTOMS)   Patient not taking: Reported on 7/2/2024   docusate sodium (COLACE) 100 mg capsule   No No   Sig: Take 1 capsule (100 mg total) by mouth every 12 (twelve) hours for 7 days   senna-docusate sodium (SENOKOT-S) 8.6-50 mg per tablet   No No   Sig: Take 1 tablet by mouth daily   Patient not taking: Reported on 7/2/2024      Facility-Administered Medications: None     Discharge Medication List as of 12/2/2024  4:37 PM        START taking these medications    Details   hydrocortisone 1 % cream Apply to affected area 2 times daily, Normal           CONTINUE these medications which have NOT CHANGED    Details   benztropine (COGENTIN) 0.5 mg tablet TAKE ONE TABLET BY MOUTH TWICE DAILY, Normal      clotrimazole (LOTRIMIN) 1 % cream Apply topically 2 (two) times a day, Starting Mon 5/1/2023, Normal      diphenhydrAMINE (BENADRYL) 25 mg tablet TAKE 1 TABLET (25 MG TOTAL) BY MOUTH DAILY AT BEDTIME AS NEEDED (IF EPS SYMPTOMS), Starting Tue 10/31/2023, Normal      docusate sodium (COLACE) 100 mg capsule Take 1 capsule (100 mg total) by mouth every 12 (twelve)  hours for 7 days, Starting Fri 8/9/2024, Until Fri 8/16/2024, Normal      senna-docusate sodium (SENOKOT-S) 8.6-50 mg per tablet Take 1 tablet by mouth daily, Starting Fri 10/6/2023, Normal             ED SEPSIS DOCUMENTATION   Time reflects when diagnosis was documented in both MDM as applicable and the Disposition within this note       Time User Action Codes Description Comment    12/2/2024  4:35 PM Jamie Pino [R21] Rash     12/2/2024  4:36 PM Jamie Pion [Z71.1] Person with feared complaint in whom no diagnosis was made     12/2/2024  4:36 PM Jamie Pino Remove [Z71.1] Person with feared complaint in whom no diagnosis was made     12/2/2024  4:36 PM Jamie Pino [Z86.59] History of schizophrenia                  Jamie Pino DO  12/02/24 6796

## 2024-12-10 ENCOUNTER — OFFICE VISIT (OUTPATIENT)
Dept: FAMILY MEDICINE CLINIC | Facility: CLINIC | Age: 34
End: 2024-12-10

## 2024-12-10 VITALS
BODY MASS INDEX: 19.74 KG/M2 | RESPIRATION RATE: 20 BRPM | TEMPERATURE: 97.5 F | DIASTOLIC BLOOD PRESSURE: 86 MMHG | OXYGEN SATURATION: 98 % | HEIGHT: 71 IN | SYSTOLIC BLOOD PRESSURE: 142 MMHG | WEIGHT: 141 LBS | HEART RATE: 69 BPM

## 2024-12-10 DIAGNOSIS — F20.0 SCHIZOPHRENIA, PARANOID, CHRONIC WITH ACUTE EXACERBATION (HCC): Primary | Chronic | ICD-10-CM

## 2024-12-10 DIAGNOSIS — Z78.9 NEED FOR FOLLOW-UP BY SOCIAL WORKER: ICD-10-CM

## 2024-12-10 PROBLEM — Z59.71 UNINSURED: Status: ACTIVE | Noted: 2024-12-10

## 2024-12-10 PROCEDURE — 96372 THER/PROPH/DIAG INJ SC/IM: CPT | Performed by: FAMILY MEDICINE

## 2024-12-10 PROCEDURE — 99214 OFFICE O/P EST MOD 30 MIN: CPT | Performed by: FAMILY MEDICINE

## 2024-12-10 RX ORDER — HALOPERIDOL DECANOATE 50 MG/ML
50 INJECTION INTRAMUSCULAR
Status: SHIPPED | OUTPATIENT
Start: 2024-12-10

## 2024-12-10 RX ADMIN — HALOPERIDOL DECANOATE 50 MG: 50 INJECTION INTRAMUSCULAR at 14:58

## 2024-12-10 NOTE — PROGRESS NOTES
Name: Tiffanie Holland      : 1990      MRN: [id number]  Encounter Provider: Darling Knight MD  Encounter Date: 12/10/2024   Encounter department: [facility]  :  Assessment & Plan  Schizophrenia, paranoid, chronic with acute exacerbation (HCC)  Schizophrenia current uncontrolled with significant thought disturbance, no SI;SIB;HI   Non compliance to Halo injection since 2024.  Unable to reality test, thoughts are circumstantial with loose associations, unable to redirect   PANSS: 65 points (mildly ill)     Plan:    Agreed to injection today  Follow up in 1 month for next injection.   Discussed side effects with mother such as acute dystonic reactions and tardive dyskinesia.  Benadryl over the counter if side effects   family will engage with [facility] to request services-information provided in AVS     Orders:    haloperidol decanoate (Haldol Decanoate) LONG-ACTING IM injection 50 mg    Need for follow-up by   Provided to mother information for [facility].            History of Present Illness   Tiffanie Holland is a 34 y.o male with a PMH of paranoid schizophrenia since , and mariguana user, who presents today accompanied with his mother for haloperidol injection that was brought by them.     The patient has not received the injection since July, and his disease is very active.    Patient is presenting severe paranoid symptoms,such us distrust, persecution and hypervigilance. He is having delusions and disorganized thinking and speech.       His mother explains that he does not want to come to the doctor for the injection and refuses all kinds of help. Normally, if he knows he has an appointment, he disappears all day and goes out into the street. In recent times he has not been aggressive but in the past he was. Currently unemployed with strong hx of leaving jobs    On today' visit, patient agreed to injection, and received  "Haldol 50 mg IM on patient's right deltoid. Patient tolerated well, without immediately side effects.              HPI  Review of Systems   Constitutional:  Negative for chills and fever.   HENT:  Negative for ear pain and sore throat.    Eyes:  Negative for pain and visual disturbance.   Respiratory:  Negative for cough and shortness of breath.    Cardiovascular:  Negative for chest pain and palpitations.   Gastrointestinal:  Negative for abdominal pain and vomiting.   Genitourinary:  Negative for dysuria and hematuria.   Musculoskeletal:  Negative for arthralgias and back pain.   Skin:  Negative for color change and rash.   Neurological:  Negative for seizures and syncope.   Psychiatric/Behavioral:  Positive for agitation, hallucinations and sleep disturbance. Negative for self-injury and suicidal ideas. The patient is hyperactive.         Psychotic symptoms such us delusion, auditory hallucinations, disorganized behavior and speech, thought disorder. Paranoid ideas and disruption of sleep. No intention to harm himself or someone else        All other systems reviewed and are negative.      Objective   /86 (BP Location: Left arm, Patient Position: Sitting, Cuff Size: Standard)   Pulse 69   Temp 97.5 °F (36.4 °C) (Temporal)   Resp 20   Ht 5' 11\" (1.803 m)   Wt 64 kg (141 lb)   SpO2 98%   BMI 19.67 kg/m²      Physical Exam  Vitals reviewed.   Constitutional:       General: He is not in acute distress.     Appearance: He is normal weight. He is not ill-appearing or diaphoretic.   HENT:      Head: Normocephalic and atraumatic.      Nose: Nose normal. No congestion or rhinorrhea.      Mouth/Throat:      Mouth: Mucous membranes are moist.      Pharynx: Oropharynx is clear.   Eyes:      Extraocular Movements: Extraocular movements intact.      Conjunctiva/sclera: Conjunctivae normal.      Pupils: Pupils are equal, round, and reactive to light.   Cardiovascular:      Rate and Rhythm: Normal rate and regular " rhythm.      Pulses: Normal pulses.      Heart sounds: Normal heart sounds. No murmur heard.     No friction rub.   Pulmonary:      Effort: Pulmonary effort is normal. No respiratory distress.      Breath sounds: Normal breath sounds. No stridor. No wheezing or rhonchi.   Abdominal:      General: Abdomen is flat. Bowel sounds are normal. There is no distension.      Palpations: Abdomen is soft.      Tenderness: There is no abdominal tenderness.   Musculoskeletal:         General: No swelling. Normal range of motion.      Right lower leg: No edema.      Left lower leg: No edema.   Skin:     General: Skin is warm and dry.      Capillary Refill: Capillary refill takes less than 2 seconds.      Findings: No rash.   Neurological:      General: No focal deficit present.      Mental Status: He is alert.   Psychiatric:         Attention and Perception: He perceives auditory hallucinations.         Mood and Affect: Mood is anxious and elated.         Speech: Speech is rapid and pressured.         Behavior: Behavior is agitated and hyperactive. Behavior is cooperative.         Thought Content: Thought content is paranoid and delusional. Thought content does not include homicidal or suicidal ideation. Thought content does not include homicidal or suicidal plan.         Cognition and Memory: Cognition is impaired.         Judgment: Judgment is inappropriate.     Administrative Statements   I have spent a total time of 40 minutes in caring for this patient on the day of the visit/encounter including Diagnostic results, Prognosis, Risks and benefits of tx options, Instructions for management, Patient and family education, and Importance of tx compliance.

## 2024-12-10 NOTE — PATIENT INSTRUCTIONS
[facility], [address] [place] [id number]  Dial: [phone]  Walk-in hours are 8:30AM to 4:15PM, Monday through Friday-- no appointment is needed.      Your safety and wellbeing as well as that of those around you is important to us. Should you or someone you know be in need, here are some area resources that may help:    Do you feel like you might be in crisis and potentially need professional services immediately?     [place] Suicide Prevention Lifeline: Dial #110  If you prefer to text, you can reach the Crisis Text Line by texting “[place]” to 361896    Are you in crisis? Text HELP to 120084 to connect with a Crisis Counselor for free. Free 24/7 support is right at your fingertips    [employer]   [employer] is the leading suicide prevention and crisis intervention nonprofit organization for LGBTQ young people. We provide information & support to LGBTQ ?young people 24/7, all year round.   Call Us: 1-928.150.5396 or Text Us: 619-804    Alternatively, you can Visit https://www.dhs.[place].gov/Services/Mental-Health-In-[place]/Documents/Suicide_Prevention_Hotlines.pdf to find your [facility]'s 24/7 crisis phone line.    Are you feeling overwhelmed, want to speak with a supportive person (NOT for crisis), and/or are you looking for additional resources?     [employer] Family Answers Warmline: Call (248) 562-2923.  This Warmline provides telephone service for Adult residents (18 years and older) of [facility] who need emotional support, help with problem solving, or information and referral. (Hours 7 Days a week 6:00 AM and 2:00 AM)    [facility]: 24/7 Helpline: 712.386.8092 or Toll-free: 260.520.6835 TTY: 543.351.6102 or online: Yedda.org/our-services/  [facility] is a safe place where ALL survivors of domestic and intimate partner abuse and their children can find refuge. [facility] offers confidential resources to individuals and families  including supportive mental health services and connection to resources. We provide services in [place] and [facility].    [place] Coalition Against Domestic Violence: Dial 1-281.680.4767 or Visit https://www.pcadv.org  Among the services provided to domestic violence victims are: crisis intervention; counseling; accompaniment to police, medical, and court facilities; and temporary emergency shelter for victims and their dependent children. Prevention and educational programs are provided to lessen the risk of domestic violence in the community at large.    Find a local Alcohol Anonymous meeting: Dial 1-186.387.7297 or Visit https://www.aateextee.[employer]/mt-kqovjil-cdsbqqzt/[place]/  Find a local Narcotics Anonymous meeting: Visit https://www.na.org/meetingsearch/     [employer] is a website where you can look for accessible care and many other services including financial assistance, food pantries, medical care, and other free or reduced-cost resources in your area, even if you don't have insurance. Visit: https://KitLocate.ZYB/    Career link is your resource for all things job and employment related. [facility], [place] [address]  Walk in or call Monday through Friday 8:00AM - 4:30 PM: 734.577.2017/TTY: 186.409.6725  www.careerlinkle[facility], [facility].org    If you need to connect with resources in your community, but don't know where to look,  is a great place to start. From help with a utilities bill, to housing assistance, after-school programs for kids, and more, you can dial 211 or text your zip code to 376-523 to talk with a  for free.    Community Health Navigation:  This resource assists individuals in need of connection to healthcare services of all types including primary care, substance dependence and acute psychiatric care on a limited basis.  Team Phone: 127.757.9482 or 816-069-9320    [facility]  Information & Referral Office is the entry point for [facility] Human Services departments and information about community resources. Professional Caseworkers will work with you to determination whether a referral requires further assessment and may either refer you to a community agency, or, complete a formal referral to a Human Services specialized office (Aging and Adult Services, Children and Youth, Mental Health, Developmental Programs or Early Intervention).     [facility], [address] [place], [place] [address]  Dial: [phone]  Walk-in hours are 8:30AM to 4:15PM, Monday through Friday-- no appointment is needed.    If you feel at risk of immediate harm to yourself or another, call 9-1-1 or go directly to the Emergency Department.

## 2024-12-10 NOTE — ASSESSMENT & PLAN NOTE
Schizophrenia current uncontrolled with significant thought disturbance, no SI;SIB;HI   Non compliance to Halo injection since July 2024.  Unable to reality test, thoughts are circumstantial with loose associations, unable to redirect   PANSS: 65 points (mildly ill)     Plan:    Agreed to injection today  Follow up in 1 month for next injection.   Discussed side effects with mother such as acute dystonic reactions and tardive dyskinesia.  Benadryl over the counter if side effects   family will engage with [facility] to request services-information provided in AVS     Orders:    haloperidol decanoate (Haldol Decanoate) LONG-ACTING IM injection 50 mg

## 2024-12-11 PROBLEM — K59.01 SLOW TRANSIT CONSTIPATION: Status: RESOLVED | Noted: 2023-10-06 | Resolved: 2024-12-11

## 2025-01-03 NOTE — TELEPHONE ENCOUNTER
No problem and thank you as well  [facility] - Internal Medicine Telemetry  Initial Discharge Assessment       Primary Care Provider: Lurdes Navarro MD    Admission Diagnosis: Screening for cardiovascular condition [Z13.6]  Sepsis, due to unspecified organism, unspecified whether acute organ dysfunction present [A41.9]    Admission Date: 12/31/2024  Expected Discharge Date: 1/5/2025         Payor: [employer] MANAGED MEDICARE / Plan: [employer] MEDICARE HMO / Product Type: Capitation /     Extended Emergency Contact Information  Primary Emergency Contact: Kat Sánchez  Address: [address] [place] [address]           [place] [address] [place]  Mobile Phone: [phone]  Relation: Sister    Discharge Plan A: Home Health  Discharge Plan B: Home with family      [id number] - [place] - [address] AT [facility] [address]  [address] [place] [address]  Phone: [phone] Fax: [phone]    [id number] - [place] - [address]  [address]  [place] [address]  Phone: [phone] Fax: [phone]      Initial Assessment (most recent)       Adult Discharge Assessment - 01/03/25 1155          Discharge Assessment    Assessment Type Discharge Planning Assessment     Confirmed/corrected address, phone number and insurance Yes     Confirmed Demographics Correct on Facesheet     Source of Information patient     When was your last doctors appointment? --   2 months ago    Communicated FLORENTINO with patient/caregiver Date not available/Unable to determine     Reason For Admission severe sepsis     People in Home sibling(s)     Facility Arrived From: home/ED     Do you expect to return to your current living situation? Yes     Do you have help at home or someone to help you manage your care at home? Yes     Who are your caregiver(s) and their phone number(s)? sister Kat Sánchez [phone]     Prior to hospitilization cognitive status: Alert/Oriented     Current  cognitive status: Alert/Oriented     Walking or Climbing Stairs Difficulty yes     Walking or Climbing Stairs transferring difficulty, requires equipment   christina    Mobility Management manual wheelchair     Dressing/Bathing Difficulty yes     Dressing/Bathing bathing difficulty, assistance 1 person     Dressing/Bathing Management assistance required     Home Accessibility wheelchair accessible     Home Layout Able to live on 1st floor     Equipment Currently Used at Home bedside commode;raised toilet;shower chair;lift device;hospital bed;other (see comments)   can and walker available however, pt unable to use them    Readmission within 30 days? No     Patient currently being followed by outpatient case management? Unable to determine (comments)   Dialysis SW follows    Do you currently have service(s) that help you manage your care at home? Yes     How Many hours does patient receive services 1     Name and Contact number of agency [facility]     Is the pt/caregiver preference to resume services with current agency Yes     Do you take prescription medications? Yes     Do you have prescription coverage? Yes     Coverage [employer] managed Medicare     Who is going to help you get home at discharge? TBD     How do you get to doctors appointments? other (see comments)   Pt pays $65 round trip to doctor's apppointments and $600 monthly for transportation to dialysis    Are you on dialysis? Yes     Dialysis Name and Scheduled days MWF [facility] [address]     Do you take coumadin? No   on Eliquis    Discharge Plan A Home Health     Discharge Plan B Home with family                    CM spoke to pt at bedside with permission.  Role of CM as part of the interdisciplinary team and discharge planning explained. Initial assessment complete.     Admitted from home/ED  Lives in her home.  Her sister is her roommate.   PLOF equipment dependent for transfers, propels manual wc with difficulty.  Pt has hired   and meals on wheels for additional support   PCP Lurdes Navarro  Emergency decision maker          Discharge Plan A and Plan B have been determined by review of patient's clinical status, future medical and therapeutic needs, and coverage/benefits for post-acute care in coordination with multidisciplinary team members.

## 2025-01-13 NOTE — PROGRESS NOTES
"Name: Tiffanie Holland      : 1990      MRN: [id number]  Encounter Provider: Darling Knight MD  Encounter Date: 2025   Encounter department: [facility]  :  Assessment & Plan  Paranoid schizophrenia (HCC)  Slightly improving  Continue uncontrolled  No SI;SIB;HI   PANSS: 46 point     Plan:  Haldol 50 mg ejection was prescribed  Received Injection today IM on RD without immediatly side effects  Extrapyramidal side effects discussed   Consider increase Haldol 100 mg monthly  if symptoms don't improve  Provide AVS to mother 2nd time to engage with [facility]   EKG to screen for QTc prolongation in next visit    Orders:    haloperidol decanoate (Haldol Decanoate) LONG-ACTING IM injection 50 mg         History of Present Illness   34 y.o male with a PMH of paranoid schizophrenia and mariguana user, who presents today accompanied with his mother for haloperidol injection. His mother reports patient's behavior is a little better. Patient still presents paranoid symptoms and delusions.       Review of Systems   Constitutional:  Negative for chills and fever.   HENT:  Negative for ear pain and sore throat.    Eyes:  Negative for pain and visual disturbance.   Respiratory:  Negative for cough and shortness of breath.    Cardiovascular:  Negative for chest pain and palpitations.   Gastrointestinal:  Negative for abdominal pain and vomiting.   Genitourinary:  Negative for dysuria and hematuria.   Musculoskeletal:  Negative for arthralgias and back pain.   Skin:  Negative for color change and rash.   Neurological:  Negative for seizures and syncope.   All other systems reviewed and are negative.      Objective   /70 (BP Location: Right arm, Patient Position: Sitting, Cuff Size: Standard)   Pulse 76   Temp 98.9 °F (37.2 °C) (Temporal)   Resp 18   Ht 5' 11\" (1.803 m)   Wt 66.7 kg (147 lb)   SpO2 96%   BMI 20.50 kg/m²      Physical Exam  Vitals and nursing note " reviewed.   Constitutional:       General: He is not in acute distress.     Appearance: He is well-developed.   HENT:      Head: Normocephalic and atraumatic.   Eyes:      Conjunctiva/sclera: Conjunctivae normal.   Cardiovascular:      Rate and Rhythm: Normal rate and regular rhythm.      Heart sounds: No murmur heard.  Pulmonary:      Effort: Pulmonary effort is normal. No respiratory distress.      Breath sounds: Normal breath sounds.   Abdominal:      Palpations: Abdomen is soft.      Tenderness: There is no abdominal tenderness.   Musculoskeletal:         General: No swelling.      Cervical back: Neck supple.   Skin:     General: Skin is warm and dry.      Capillary Refill: Capillary refill takes less than 2 seconds.   Neurological:      Mental Status: He is alert.   Psychiatric:         Mood and Affect: Mood normal.       Administrative Statements   I have spent a total time of 30 minutes in caring for this patient on the day of the visit/encounter including Diagnostic results, Prognosis, Risks and benefits of tx options, Instructions for management, Patient and family education, Importance of tx compliance, Risk factor reductions, Impressions, Counseling / Coordination of care, and Communicating with other healthcare professionals .

## 2025-01-14 ENCOUNTER — PROCEDURE VISIT (OUTPATIENT)
Dept: FAMILY MEDICINE CLINIC | Facility: CLINIC | Age: 35
End: 2025-01-14

## 2025-01-14 VITALS
WEIGHT: 147 LBS | HEART RATE: 76 BPM | TEMPERATURE: 98.9 F | RESPIRATION RATE: 18 BRPM | OXYGEN SATURATION: 96 % | HEIGHT: 71 IN | DIASTOLIC BLOOD PRESSURE: 70 MMHG | BODY MASS INDEX: 20.58 KG/M2 | SYSTOLIC BLOOD PRESSURE: 122 MMHG

## 2025-01-14 DIAGNOSIS — F20.0 PARANOID SCHIZOPHRENIA (HCC): Primary | Chronic | ICD-10-CM

## 2025-01-14 PROCEDURE — 96372 THER/PROPH/DIAG INJ SC/IM: CPT | Performed by: FAMILY MEDICINE

## 2025-01-14 PROCEDURE — 99214 OFFICE O/P EST MOD 30 MIN: CPT | Performed by: FAMILY MEDICINE

## 2025-01-14 RX ORDER — HALOPERIDOL DECANOATE 50 MG/ML
50 INJECTION INTRAMUSCULAR
Status: CANCELLED | OUTPATIENT
Start: 2025-02-04

## 2025-01-14 RX ORDER — HALOPERIDOL DECANOATE 50 MG/ML
50 INJECTION INTRAMUSCULAR
Status: SHIPPED | OUTPATIENT
Start: 2025-01-14

## 2025-01-14 RX ADMIN — HALOPERIDOL DECANOATE 50 MG: 50 INJECTION INTRAMUSCULAR at 15:11

## 2025-01-15 NOTE — ASSESSMENT & PLAN NOTE
Slightly improving  Continue uncontrolled  No SI;SIB;HI   PANSS: 46 point     Plan:  Haldol 50 mg ejection was prescribed  Received Injection today IM on RD without immediatly side effects  Extrapyramidal side effects discussed   Consider increase Haldol 100 mg monthly  if symptoms don't improve  Provide AVS to mother 2nd time to engage with [facility]   EKG to screen for QTc prolongation in next visit    Orders:    haloperidol decanoate (Haldol Decanoate) LONG-ACTING IM injection 50 mg

## 2025-07-01 ENCOUNTER — HOSPITAL ENCOUNTER (EMERGENCY)
Facility: HOSPITAL | Age: 35
Discharge: HOME | End: 2025-07-01
Attending: EMERGENCY MEDICINE | Admitting: EMERGENCY MEDICINE
Payer: COMMERCIAL

## 2025-07-01 VITALS
TEMPERATURE: 97.5 F | OXYGEN SATURATION: 97 % | DIASTOLIC BLOOD PRESSURE: 73 MMHG | HEART RATE: 74 BPM | RESPIRATION RATE: 18 BRPM | SYSTOLIC BLOOD PRESSURE: 119 MMHG

## 2025-07-01 DIAGNOSIS — H15.9: Primary | ICD-10-CM

## 2025-07-01 DIAGNOSIS — F20.9 SCHIZOPHRENIA (HCC): ICD-10-CM

## 2025-07-01 PROCEDURE — 99284 EMERGENCY DEPT VISIT MOD MDM: CPT | Performed by: EMERGENCY MEDICINE

## 2025-07-01 PROCEDURE — 99283 EMERGENCY DEPT VISIT LOW MDM: CPT

## 2025-07-01 RX ORDER — TETRACAINE HYDROCHLORIDE 5 MG/ML
2 SOLUTION OPHTHALMIC ONCE
Status: COMPLETED | OUTPATIENT
Start: 2025-07-01 | End: 2025-07-01

## 2025-07-01 RX ADMIN — FLUORESCEIN SODIUM 1 STRIP: 1 STRIP OPHTHALMIC at 14:32

## 2025-07-01 RX ADMIN — TETRACAINE HYDROCHLORIDE 2 DROP: 5 SOLUTION OPHTHALMIC at 14:33

## 2025-07-01 NOTE — DISCHARGE INSTRUCTIONS
Follow up with Dr. Velásquez as recommended.  Return to the nearest emergency  department if you have severe eye pain, loss of vision, drainage from eye or you are concerned about anything else.

## 2025-07-01 NOTE — ED PROVIDER NOTES
"Time reflects when diagnosis was documented in both MDM as applicable and the Disposition within this note       Time User Action Codes Description Comment    7/1/2025  2:48 PM Sadie Morales Add [H15.9] Disorder of sclera of left eye     7/1/2025  2:59 PM Tiburcio Roberson Add [F20.9] Schizophrenia (HCC)           ED Disposition       ED Disposition   Discharge    Condition   Stable    Date/Time   Tue Jul 1, 2025  2:48 PM    Comment   Jonhairo Marine discharge to home/self care.                   Assessment & Plan       Medical Decision Making  Patient is a 35 y.o. male  who presents to the ED with left sided scleral hyperpigmentation.    Vital signs stable. Exam as listed below.    History and physical exam are most consistent with left nasal scleral hyperpigmentation.  Differential diagnosis also includes but is not limited to corneal abrasion, foreign body, corneal ulcer, melanoma, conjunctivitis.    Plan follow-up with ophthalmology in 1 week.    View ED course below for further discussion on patient workup.       Upon re-evaluation patient is stable for discharge and should follow up with ophthalmology in one week. Discussed return precautions with patient and they expressed understanding.     Portions of the record may have been created with voice recognition software. Occasional wrong word or \"sound a like\" substitutions may have occurred due to the inherent limitations of voice recognition software. Read the chart carefully and recognize, using context, where substitutions have occurred.     ED 07/ED 07       Risk  Prescription drug management.             Medications   fluorescein sodium sterile ophthalmic strip 1 strip (1 strip Both Eyes Given by Other 7/1/25 5202)   tetracaine 0.5 % ophthalmic solution 2 drop (2 drops Both Eyes Given by Other 7/1/25 7413)       ED Risk Strat Scores                    No data recorded                            History of Present Illness       Chief Complaint   Patient " presents with    Eye Problem     States his L eye bothering him for a few months. Has a brown spot to L eye but states he's not sure what its from.        Past Medical History[1]   Past Surgical History[2]   Family History[3]   Social History[4]   E-Cigarette/Vaping    E-Cigarette Use Never User       E-Cigarette/Vaping Substances    Nicotine No     THC No     CBD No     Flavoring No     Other No     Unknown No       I have reviewed and agree with the history as documented.     35-year-old male w/ a hx of schizophrenia and hallucinations presented with left nasal L-shaped scleral hyperpigmentation increasing in size over the past few months.  Patient states he has had this since he was 5 years old with intermittent pain and vision changes.  Denies discharge, itchiness, trauma to the eye, redness. Has never seen an ophthalmologist for this issue.              Review of Systems   Eyes:  Positive for pain and visual disturbance. Negative for photophobia, discharge, redness and itching.           Objective       ED Triage Vitals [07/01/25 1325]   Temperature Pulse Blood Pressure Respirations SpO2 Patient Position - Orthostatic VS   97.5 °F (36.4 °C) 74 119/73 18 97 % --      Temp src Heart Rate Source BP Location FiO2 (%) Pain Score    -- Monitor -- -- --      Vitals      Date and Time Temp Pulse SpO2 Resp BP Pain Score FACES Pain Rating User   07/01/25 1325 97.5 °F (36.4 °C) 74 97 % 18 119/73 -- -- DEA            Physical Exam  Vitals and nursing note reviewed.   Constitutional:       General: He is not in acute distress.     Appearance: He is well-developed.   HENT:      Head: Normocephalic and atraumatic.     Eyes:      General: Vision grossly intact.         Left eye: No foreign body, discharge or hordeolum.      Extraocular Movements:      Right eye: Normal extraocular motion.      Left eye: Normal extraocular motion.      Conjunctiva/sclera: Conjunctivae normal.      Left eye: No exudate or hemorrhage.     Pupils:  Pupils are equal, round, and reactive to light.      Left eye: No fluorescein uptake.      Slit lamp exam:     Left eye: No corneal ulcer, foreign body, hyphema, anterior chamber bulge or photophobia.      Comments: Visual acuity 20/63 bilaterally.  No APD.  No evidence of corneal ulcer, abrasion, foreign body, globe rupture, hyphema.     Cardiovascular:      Rate and Rhythm: Normal rate and regular rhythm.      Heart sounds: No murmur heard.  Pulmonary:      Effort: Pulmonary effort is normal. No respiratory distress.      Breath sounds: Normal breath sounds.   Abdominal:      Palpations: Abdomen is soft.      Tenderness: There is no abdominal tenderness.     Musculoskeletal:         General: No swelling.      Cervical back: Neck supple.     Skin:     General: Skin is warm and dry.      Capillary Refill: Capillary refill takes less than 2 seconds.     Neurological:      Mental Status: He is alert.     Psychiatric:         Mood and Affect: Mood normal.         Results Reviewed       None            No orders to display       Procedures    ED Medication and Procedure Management   Prior to Admission Medications   Prescriptions: None   Facility-Administered Medications Last Administration Doses Remaining   haloperidol decanoate (Haldol Decanoate) LONG-ACTING IM injection 50 mg 12/10/2024  2:58 PM    haloperidol decanoate (Haldol Decanoate) LONG-ACTING IM injection 50 mg 1/14/2025  3:11 PM         There are no discharge medications for this patient.    No discharge procedures on file.  ED SEPSIS DOCUMENTATION   Time reflects when diagnosis was documented in both MDM as applicable and the Disposition within this note       Time User Action Codes Description Comment    7/1/2025  2:48 PM Sadie Morales Add [H15.9] Disorder of sclera of left eye     7/1/2025  2:59 PM Tiburcio Roberson Add [F20.9] Schizophrenia (HCC)                    Sadie Morales DO  07/01/25 6196         [1]   Past Medical History:  Diagnosis Date     Disease of thyroid gland     Hallucination     Psychiatric disorder     Psychiatric illness     Schizophrenia (HCC)    [2]   Past Surgical History:  Procedure Laterality Date    MASS EXCISION Right 6/12/2024    Procedure: EXCISION NODULE OF ARMPIT;  Surgeon: Luis Yip MD;  Location:  MAIN OR;  Service: Plastics    SCALP EXCISION N/A 6/12/2024    Procedure: EXCISION SCALP FOREIGN BODY;  Surgeon: Luis Yip MD;  Location:  MAIN OR;  Service: Plastics   [3]   Family History  Family history unknown: Yes   [4]   Social History  Tobacco Use    Smoking status: Every Day     Current packs/day: 0.50     Types: Cigarettes     Passive exposure: Current    Smokeless tobacco: Never   Vaping Use    Vaping status: Never Used   Substance Use Topics    Alcohol use: Yes     Alcohol/week: 7.0 - 10.0 standard drinks of alcohol     Types: 7 - 10 Cans of beer per week     Comment: Socially    Drug use: Yes     Types: Marijuana     Comment: pt refused to answer         aSdie Morales DO  07/01/25 3679

## 2025-07-01 NOTE — ED ATTENDING ATTESTATION
7/1/2025  I, Tiburcio Roberson DO, saw and evaluated the patient. I have discussed the patient with the resident/non-physician practitioner and agree with the resident's/non-physician practitioner's findings, Plan of Care, and MDM as documented in the resident's/non-physician practitioner's note, except where noted. All available labs and Radiology studies were reviewed.  I was present for key portions of any procedure(s) performed by the resident/non-physician practitioner and I was immediately available to provide assistance.       At this point I agree with the current assessment done in the Emergency Department.  I have conducted an independent evaluation of this patient a history and physical is as follows:    Patient is a 35-year-old male with a history of paranoid schizophrenia, tobacco use, does not wear glasses or contacts, says that for a while he has had some discoloration in the nasal aspect of his left eyeball, and the white area.  He is not sure exactly how long it has been there but thinks maybe several months up to several years.  Does not hurt him.  He has no blurred vision, no double vision, no falls, no trauma, does not remember any particular foreign body.  Does not have an eye doctor, has not sought medical care for this.  He cannot explain why he decided to come to the ED today as opposed to another time.  Says he is otherwise been doing well.    General:  Patient is well-appearing  Head:  Atraumatic  Eyes: PERRL, EOMI, no APD, no visual field defects  Right eye: Lids & lashes unremarkable, no foreign body including after lid eversion, conjunctiva pink, sclera white, cornea not cloudy or steamy, no hyphema or hypopyon  Left eye: Lids & lashes unremarkable, no foreign body including after lid eversion, conjunctiva pink, on the nasal aspect of the patient's left sclera is a slightly irregular L-shaped area of hyperpigmentation, proximally 1 cm on each side.  No clear demarcated borders.  This is  clearly on the sclera itself and not on the cornea and there is no sign of corneal foreign bodies., cornea not cloudy or steamy, no hyphema or hypopyon  Slit lamp exam right eye:  Fluorescein staining shows no evidence of corneal abrasion, no abnormal dye uptake, no ulcers, no evidence of globe rupture, no cell or flare  Slit lamp exam left eye:  Fluorescein staining shows no evidence of corneal abrasion, no abnormal dye uptake, no ulcers, no evidence of globe rupture, no cell or flare  Visual acuity: OS 20/63, OD 20/63, OU 20/63,  ENT:  Mucous membranes moist  Neck:  Supple  Pulmonary: Normal effort  Neurologic:  Awake, fluent speech, normal comprehension  Skin:  Pink warm and dry  Psychiatric:  Alert, pleasant, cooperative      ED Course     Patient has a hyperpigmented area on his left sclera, no sign of foreign body, no sign of rust ring, no sign of corneal abrasion or ulceration.  May be benign pigmentation, however cannot rule out more significant pathology such as possible malignancy here in the ED.  Patient given information about following with on-call ophthalmology.Supportive care, importance of follow-up and return precautions were discussed with the patient, who expressed understanding.      MEDICAL DECISION MAKING CODING    COLLECTION AND INTERPRETATION OF DATA  I reviewed prior external notes, including 12/10/2024 family medicine office visit            Critical Care Time  Procedures

## (undated) DEVICE — NEEDLE 25G X 1 1/2

## (undated) DEVICE — 1820 FOAM BLOCK NEEDLE COUNTER: Brand: DEVON

## (undated) DEVICE — LIGHT GLOVE GREEN

## (undated) DEVICE — SINGLE PORT MANIFOLD: Brand: NEPTUNE 2

## (undated) DEVICE — CRADLE EXTREMITY UNIVERSAL CONTOURED

## (undated) DEVICE — STERILE POLYISOPRENE POWDER-FREE SURGICAL GLOVES: Brand: PROTEXIS

## (undated) DEVICE — PENCIL ELECTROSURG E-Z CLEAN -0035H

## (undated) DEVICE — SYRINGE 10ML LL CONTROL TOP

## (undated) DEVICE — SCD SEQUENTIAL COMPRESSION COMFORT SLEEVE MEDIUM KNEE LENGTH: Brand: KENDALL SCD

## (undated) DEVICE — NEEDLE BLUNT 18 G X 1 1/2IN

## (undated) DEVICE — SKIN MARKER DUAL TIP WITH RULER CAP, FLEXIBLE RULER AND LABELS: Brand: DEVON

## (undated) DEVICE — BASIC PACK: Brand: CONVERTORS

## (undated) DEVICE — SPONGE 4 X 4 XRAY 16 PLY STRL LF RFD

## (undated) DEVICE — DISPOSABLE OR TOWEL: Brand: CARDINAL HEALTH

## (undated) DEVICE — INTENDED FOR TISSUE SEPARATION, AND OTHER PROCEDURES THAT REQUIRE A SHARP SURGICAL BLADE TO PUNCTURE OR CUT.: Brand: BARD-PARKER ® SAFETYLOCK CARBON RIB-BACK BLADES

## (undated) DEVICE — TUBING SUCTION 5MM X 12 FT

## (undated) DEVICE — SYRINGE 30ML LL

## (undated) DEVICE — TIBURON SPLIT SHEET: Brand: CONVERTORS

## (undated) DEVICE — BULB SYRINGE,IRRIGATION WITH PROTECTIVE CAP: Brand: DOVER

## (undated) DEVICE — GAUZE SPONGES,16 PLY: Brand: CURITY

## (undated) DEVICE — SOLUTION BOWL: Brand: KENDALL

## (undated) DEVICE — BASIC SINGLE BASIN-LF: Brand: MEDLINE INDUSTRIES, INC.

## (undated) DEVICE — BETHLEHEM UNIVERSAL  MIONR EXT: Brand: CARDINAL HEALTH

## (undated) DEVICE — STOCKINETTE 2P PREROLLD 6X60